# Patient Record
Sex: FEMALE | Race: WHITE | NOT HISPANIC OR LATINO | Employment: OTHER | URBAN - METROPOLITAN AREA
[De-identification: names, ages, dates, MRNs, and addresses within clinical notes are randomized per-mention and may not be internally consistent; named-entity substitution may affect disease eponyms.]

---

## 2023-10-16 NOTE — PROGRESS NOTES
History Of Present Illness  Marisabel Richardson is a 73 y.o. female referred by Dr. Sina Wilkins for evaluation of sigmoid colon polyp.    Went to ER with abdominal pain.  A CT was performed.  Pain occurs when she needs to have a bowel motion.  Was prescribed Bentyl for spasms.  Sent for a colonoscopy.    8/30/2023 CT ABD/PELVIS WO CONTRAST   A 4.3 cm x 3.1 cm soft tissue density projects in the sigmoid colon (with element of rectal wall thickening not excluded).  Follow up lower endoscopy is warranted to exclude underlying colonic neoplasia.  No hepatosplenomegaly.  A 7 mm low density focus in the segment VII cannot be fully characterize with intravenous contrast (but may relate to cyst or hemagngioma).  Pancreas closely abuts adjacent unenhanced bowel vasculature, limiting it's detailed assessment of it's head.  Question of subtle prominence of the pancreatic duct in the head of the pancreas (6 mm transverse dimension).  Mild prominence of volume of the pancreatic tail is evident.  Asymmetrical prominence of left adrenal gland (10 mm x 17 mm) is identified (with low density, less than 10 HU), likely attributable to adrenal adenoma.  A 6 mm low density exophytic focus at the upper pole right kidney demonstrates density greater than expected for simple cyst (27 HU).  Note of 2 mm non-obstructive radiopaque right mid pole renal calculus (without obstructive uropathy).  Scattered (mainly subcentimeter) retroperitoneal lymph nodes are incidentally noted.  Note:  Enhanced MR imaging of the abdomen would allow for further assessment of the pancreas, liver, adrenal gland, and right kidney.    10/02/2023 Colonoscopy  The digital rectal exam was normal.  There was significant spasm in the sigmoid colon.  Two sessile polyps were found in the ascending colon.  The polyps were 7 to 8 mm in size.  These polyps were removed with a hot snare.  Resection and retrieval were complete.  A 7 mm polyp was found in the transverse  colon.  The polyp was sessile. The polyp was removed with hot biopsy forceps. Resection and retrieval were complete.  Two sessile polyps were found in the transverse colon.  The polyps were 8 to 12 mm in size. These polyps were removed with a hot snare. Resection and retrieval were complete.  Two hyperplastic polyps were found in the sigmoid colon.  The polyps were 5 to 6 mm in size.  The polyps were removed with a hot biopsy forceps. Resection and retrieval were complete.  Multiple hyperplastic polyps were found in the recto-sigmoid colon and sigmoid colon.  A greater than 60 mm polyp was found in the sigmoid colon.  The polyp was sessile. The polyp was removed with a cold snare.  Polyp resection was incomplete. The resected tissue was retrieved.  Area was tattooed with an injection of dye.  This exam was otherwise normal throughout the examined colon.  Internal hemorrhoids were found during retroflexion. The hemorrhoids were small.    Pathology;   Sigmoid colon polyps:  Hyperplastic polyps  Ascending Colon Polyps:  Tubular adenoma and hyperplastic polyp  Transverse Colon Polyps:  Tubular adenoma and serrated polyps  Sigmoid Colon polyp:  Villotubular adenoma with low grade (moderate) glandular dysplasia        Past Medical History  Colon polyps  IBS  HLD  Hypothyroidism  Anxiety      Surgical History  Hysterectomy 1993       Social History  Smoking:  Current smoker 1 PPD  ETOH    Family History  No family history of colon cancer.       Visit Vitals  /85 (BP Location: Left arm)   Pulse 88   Temp 37.2 °C (99 °F)   Wt 61.7 kg (136 lb)       Review of Systems  Constitutional: Negative for fever, chills, anorexia, weight loss, malaise     ENMT: Negative for nasal discharge, congestion, ear pain, mouth pain, throat pain     Respiratory: Negative for cough, hemoptysis, wheezing, shortness of breath     Cardiac: Negative for chest pain, dyspnea on exertion, orthopnea, palpitations, syncope,  (+)HLD     Gastrointestinal: Negative for nausea, vomiting, diarrhea, constipation, abdominal pain, (+)COLON POLYPS    Genitourinary: Negative for discharge, dysuria, flank pain, frequency, hematuria     Musculoskeletal: Negative for decreased ROM, pain, swelling, weakness     Neurological: Negative for dizziness, confusion, headache, seizures, syncope     Psychiatric: Negative for mood changes, anxiety, hallucinations, sleep changes, suicidal ideas (+)ANXIETY     Skin: Negative for mass, pain, itching, rash, ulcer     Endocrine: Negative for heat intolerance, cold intolerance, excessive sweating, polyuria, excess thirst, (+)HYPOTHYROIDISM     Hematologic/Lymph: Negative for anemia, bruising, easy bleeding, night sweats, petechiae, history of DVT/PE or cancer     Allergic/Immunologic: Negative for anaphylaxis, itchy/ teary eyes, itching, sneezing, swelling         Physical Exam  Constitutional:       Appearance: Normal appearance.   HENT:      Head: Normocephalic.   Cardiovascular:      Rate and Rhythm: Normal rate and regular rhythm.      Pulses: Normal pulses.   Pulmonary:      Effort: Pulmonary effort is normal.   Abdominal:      General: Bowel sounds are normal.      Palpations: Abdomen is soft.   Musculoskeletal:         General: Normal range of motion.   Skin:     General: Skin is warm.   Neurological:      General: No focal deficit present.      Mental Status: She is alert.   Psychiatric:         Mood and Affect: Mood normal.         Behavior: Behavior normal.     Consent was obtained and patient was placed in left lateral cubitus position.  Flexible sigmoidoscope was inserted via the anus and manipulated to approximately 25 cm.  Large friable mass was seen at approximately 20 cm with area of tattoo in the distal sigmoid colon.  This was suspicious for underlying malignancy and did not appear to be endoscopically resectable.  On withdrawal there were multiple rectal polyps ranging from 5 mm to 1 cm.         Assessment/Plan   Problem List Items Addressed This Visit             ICD-10-CM       Gastrointestinal and Abdominal    Mass of colon K63.89    Relevant Orders    Carcinoembryonic Antigen    CT chest abdomen pelvis w IV contrast    Comprehensive Metabolic Panel    CBC    Liver cyst K76.89       Genitourinary and Reproductive    Renal lesion - Primary N28.9       Hematology and Neoplasia    Adenoma of left adrenal gland D35.02     Sigmoid colon mass with at least dysplasia, incomplete resection during colonoscopy and not amenable to endoscopic resection.  Concern for underlying malignancy.  Plan for full metastatic work-up, CT chest abdomen pelvis with IV and oral contrast, CEA.  Renal ultrasound to assess exophytic renal lesion  Plan for laparoscopic sigmoid colectomy  Risks and benefits of surgery were discussed with the patient including, but not limited to: conversion to an open procedure, infection, bleeding, wound healing issues, anastomotic leak or stricture, damage to surrounding structures (including the ureters, nerves, and major blood vessels), change in bowel habits, ostomy, incontinence, impotence, risks with anesthesia,  blood clot, heart attack, stroke, COVID infection, and death.    Continue to monitor liver cyst, adrenal adenoma

## 2023-10-24 ENCOUNTER — OFFICE VISIT (OUTPATIENT)
Dept: SURGERY | Facility: CLINIC | Age: 73
End: 2023-10-24
Payer: MEDICARE

## 2023-10-24 ENCOUNTER — LAB (OUTPATIENT)
Dept: LAB | Facility: LAB | Age: 73
End: 2023-10-24
Payer: MEDICARE

## 2023-10-24 VITALS
HEART RATE: 88 BPM | WEIGHT: 136 LBS | TEMPERATURE: 99 F | SYSTOLIC BLOOD PRESSURE: 141 MMHG | DIASTOLIC BLOOD PRESSURE: 85 MMHG

## 2023-10-24 DIAGNOSIS — K63.89 MASS OF COLON: ICD-10-CM

## 2023-10-24 DIAGNOSIS — D35.02 ADENOMA OF LEFT ADRENAL GLAND: ICD-10-CM

## 2023-10-24 DIAGNOSIS — K63.89 COLONIC MASS: ICD-10-CM

## 2023-10-24 DIAGNOSIS — K76.89 LIVER CYST: ICD-10-CM

## 2023-10-24 DIAGNOSIS — N28.9 RENAL LESION: Primary | ICD-10-CM

## 2023-10-24 LAB
ALBUMIN SERPL BCP-MCNC: 4.3 G/DL (ref 3.4–5)
ALP SERPL-CCNC: 95 U/L (ref 33–136)
ALT SERPL W P-5'-P-CCNC: 8 U/L (ref 7–45)
ANION GAP SERPL CALC-SCNC: 11 MMOL/L (ref 10–20)
AST SERPL W P-5'-P-CCNC: 12 U/L (ref 9–39)
BILIRUB SERPL-MCNC: 0.5 MG/DL (ref 0–1.2)
BUN SERPL-MCNC: 17 MG/DL (ref 6–23)
CALCIUM SERPL-MCNC: 9.1 MG/DL (ref 8.6–10.3)
CHLORIDE SERPL-SCNC: 103 MMOL/L (ref 98–107)
CO2 SERPL-SCNC: 27 MMOL/L (ref 21–32)
CREAT SERPL-MCNC: 0.76 MG/DL (ref 0.5–1.05)
ERYTHROCYTE [DISTWIDTH] IN BLOOD BY AUTOMATED COUNT: 12.8 % (ref 11.5–14.5)
GFR SERPL CREATININE-BSD FRML MDRD: 84 ML/MIN/1.73M*2
GLUCOSE SERPL-MCNC: 95 MG/DL (ref 74–99)
HCT VFR BLD AUTO: 45.1 % (ref 36–46)
HGB BLD-MCNC: 15.3 G/DL (ref 12–16)
MCH RBC QN AUTO: 31.7 PG (ref 26–34)
MCHC RBC AUTO-ENTMCNC: 33.9 G/DL (ref 32–36)
MCV RBC AUTO: 94 FL (ref 80–100)
NRBC BLD-RTO: 0 /100 WBCS (ref 0–0)
PLATELET # BLD AUTO: 255 X10*3/UL (ref 150–450)
PMV BLD AUTO: 10 FL (ref 7.5–11.5)
POTASSIUM SERPL-SCNC: 4.5 MMOL/L (ref 3.5–5.3)
PROT SERPL-MCNC: 6.4 G/DL (ref 6.4–8.2)
RBC # BLD AUTO: 4.82 X10*6/UL (ref 4–5.2)
SODIUM SERPL-SCNC: 136 MMOL/L (ref 136–145)
WBC # BLD AUTO: 6.6 X10*3/UL (ref 4.4–11.3)

## 2023-10-24 PROCEDURE — 45330 DIAGNOSTIC SIGMOIDOSCOPY: CPT | Performed by: SURGERY

## 2023-10-24 PROCEDURE — 80053 COMPREHEN METABOLIC PANEL: CPT

## 2023-10-24 PROCEDURE — 99215 OFFICE O/P EST HI 40 MIN: CPT | Performed by: SURGERY

## 2023-10-24 PROCEDURE — 1159F MED LIST DOCD IN RCRD: CPT | Performed by: SURGERY

## 2023-10-24 PROCEDURE — 85027 COMPLETE CBC AUTOMATED: CPT

## 2023-10-24 PROCEDURE — 82378 CARCINOEMBRYONIC ANTIGEN: CPT

## 2023-10-24 PROCEDURE — 36415 COLL VENOUS BLD VENIPUNCTURE: CPT

## 2023-10-24 PROCEDURE — 99205 OFFICE O/P NEW HI 60 MIN: CPT | Performed by: SURGERY

## 2023-10-24 RX ORDER — SERTRALINE HYDROCHLORIDE 100 MG/1
100 TABLET, FILM COATED ORAL DAILY
COMMUNITY

## 2023-10-24 RX ORDER — AMITRIPTYLINE HYDROCHLORIDE 25 MG/1
TABLET, FILM COATED ORAL 2 TIMES DAILY
COMMUNITY

## 2023-10-24 RX ORDER — DICYCLOMINE HYDROCHLORIDE 20 MG/1
TABLET ORAL 3 TIMES DAILY PRN
COMMUNITY
End: 2024-01-22 | Stop reason: ALTCHOICE

## 2023-10-24 RX ORDER — LEVOTHYROXINE SODIUM 25 UG/1
25 TABLET ORAL
COMMUNITY

## 2023-10-25 LAB — CEA SERPL-MCNC: 5.8 UG/L

## 2023-10-25 RX ORDER — GABAPENTIN 100 MG/1
CAPSULE ORAL
Qty: 3 CAPSULE | Refills: 0 | Status: SHIPPED | OUTPATIENT
Start: 2023-10-25 | End: 2023-12-07 | Stop reason: WASHOUT

## 2023-10-25 RX ORDER — NEOMYCIN SULFATE 500 MG/1
TABLET ORAL
Qty: 6 TABLET | Refills: 0 | Status: SHIPPED | OUTPATIENT
Start: 2023-10-25 | End: 2023-12-07 | Stop reason: WASHOUT

## 2023-10-25 RX ORDER — METRONIDAZOLE 250 MG/1
TABLET ORAL
Qty: 3 TABLET | Refills: 0 | Status: SHIPPED | OUTPATIENT
Start: 2023-10-25 | End: 2023-11-30 | Stop reason: ALTCHOICE

## 2023-10-30 ENCOUNTER — TELEPHONE (OUTPATIENT)
Dept: SURGERY | Facility: CLINIC | Age: 73
End: 2023-10-30
Payer: MEDICARE

## 2023-10-30 NOTE — TELEPHONE ENCOUNTER
She is calling c/o constipation.  History of sigmoid colon mass with at least dysplasia, incomplete resection during colonoscopy and not amenable to endoscopic resection.  Concern for underlying malignancy. On OR schedule for 12/06/2023.  She is reporting that she has not had a bowel motion since last Wednesday.  Has been struggling with constipation since summer.  She has been taking two capfuls of Miralax daily but has not yet moved her bowels.  Denies abdominal pain.  Denies n/v.  I advised to take a stimulant laxative like Milk of Magnesia or she may mix the entire bottle of 238 g of Miralax with 64 oz and drink it like a colonoscopy prep.    She will need to take preventive laxatives daily until surgery on a daily basis so this does not happen again.  Patient verbalized understanding and agreed.  Fariha Back RN

## 2023-11-08 ENCOUNTER — ANCILLARY PROCEDURE (OUTPATIENT)
Dept: RADIOLOGY | Facility: CLINIC | Age: 73
End: 2023-11-08
Payer: MEDICARE

## 2023-11-08 DIAGNOSIS — N28.9 RENAL LESION: ICD-10-CM

## 2023-11-08 DIAGNOSIS — K63.89 MASS OF COLON: Primary | ICD-10-CM

## 2023-11-08 PROCEDURE — 74177 CT ABD & PELVIS W/CONTRAST: CPT | Performed by: RADIOLOGY

## 2023-11-08 PROCEDURE — 74177 CT ABD & PELVIS W/CONTRAST: CPT

## 2023-11-08 PROCEDURE — 76770 US EXAM ABDO BACK WALL COMP: CPT

## 2023-11-08 PROCEDURE — 71260 CT THORAX DX C+: CPT | Performed by: RADIOLOGY

## 2023-11-08 PROCEDURE — 2550000001 HC RX 255 CONTRASTS: Performed by: SURGERY

## 2023-11-08 PROCEDURE — 76770 US EXAM ABDO BACK WALL COMP: CPT | Performed by: RADIOLOGY

## 2023-11-08 RX ADMIN — IOHEXOL 75 ML: 350 INJECTION, SOLUTION INTRAVENOUS at 10:27

## 2023-11-09 DIAGNOSIS — K63.89 COLONIC MASS: Primary | ICD-10-CM

## 2023-11-09 RX ORDER — SODIUM CHLORIDE 9 MG/ML
10 INJECTION, SOLUTION INTRAVENOUS CONTINUOUS
Status: CANCELLED | OUTPATIENT
Start: 2023-11-09

## 2023-11-09 RX ORDER — HEPARIN SODIUM 5000 [USP'U]/ML
5000 INJECTION, SOLUTION INTRAVENOUS; SUBCUTANEOUS ONCE
Status: CANCELLED | OUTPATIENT
Start: 2023-11-09 | End: 2023-11-09

## 2023-11-09 RX ORDER — METRONIDAZOLE 500 MG/100ML
500 INJECTION, SOLUTION INTRAVENOUS ONCE
Status: CANCELLED | OUTPATIENT
Start: 2023-11-09 | End: 2023-11-09

## 2023-11-10 ENCOUNTER — TELEMEDICINE CLINICAL SUPPORT (OUTPATIENT)
Dept: PREADMISSION TESTING | Facility: HOSPITAL | Age: 73
End: 2023-11-10
Payer: MEDICARE

## 2023-11-10 RX ORDER — BISMUTH SUBSALICYLATE 262 MG
1 TABLET,CHEWABLE ORAL DAILY
COMMUNITY

## 2023-11-10 RX ORDER — ATORVASTATIN CALCIUM 20 MG/1
20 TABLET, FILM COATED ORAL DAILY
COMMUNITY

## 2023-11-10 RX ORDER — DEXTROMETHORPHAN HYDROBROMIDE, GUAIFENESIN 5; 100 MG/5ML; MG/5ML
650 LIQUID ORAL EVERY 8 HOURS PRN
COMMUNITY
End: 2023-12-07 | Stop reason: WASHOUT

## 2023-11-14 ENCOUNTER — PRE-ADMISSION TESTING (OUTPATIENT)
Dept: PREADMISSION TESTING | Facility: HOSPITAL | Age: 73
DRG: 330 | End: 2023-11-14
Payer: MEDICARE

## 2023-11-14 ENCOUNTER — ANESTHESIA EVENT (OUTPATIENT)
Dept: OPERATING ROOM | Facility: HOSPITAL | Age: 73
DRG: 330 | End: 2023-11-14
Payer: MEDICARE

## 2023-11-14 ENCOUNTER — LAB (OUTPATIENT)
Dept: LAB | Facility: LAB | Age: 73
End: 2023-11-14
Payer: MEDICARE

## 2023-11-14 VITALS
HEART RATE: 97 BPM | HEIGHT: 68 IN | RESPIRATION RATE: 18 BRPM | TEMPERATURE: 95.7 F | WEIGHT: 140.87 LBS | OXYGEN SATURATION: 99 % | DIASTOLIC BLOOD PRESSURE: 71 MMHG | SYSTOLIC BLOOD PRESSURE: 144 MMHG | BODY MASS INDEX: 21.35 KG/M2

## 2023-11-14 DIAGNOSIS — K63.89 MASS OF COLON: Primary | ICD-10-CM

## 2023-11-14 DIAGNOSIS — K63.89 MASS OF COLON: ICD-10-CM

## 2023-11-14 LAB
ABO GROUP (TYPE) IN BLOOD: NORMAL
ANTIBODY SCREEN: NORMAL
ATRIAL RATE: 89 BPM
P AXIS: 78 DEGREES
P OFFSET: 192 MS
P ONSET: 148 MS
PR INTERVAL: 148 MS
Q ONSET: 222 MS
QRS COUNT: 15 BEATS
QRS DURATION: 72 MS
QT INTERVAL: 362 MS
QTC CALCULATION(BAZETT): 440 MS
QTC FREDERICIA: 412 MS
R AXIS: 75 DEGREES
RH FACTOR (ANTIGEN D): NORMAL
T AXIS: 71 DEGREES
T OFFSET: 403 MS
VENTRICULAR RATE: 89 BPM

## 2023-11-14 PROCEDURE — 86900 BLOOD TYPING SEROLOGIC ABO: CPT

## 2023-11-14 PROCEDURE — 86850 RBC ANTIBODY SCREEN: CPT

## 2023-11-14 PROCEDURE — 36415 COLL VENOUS BLD VENIPUNCTURE: CPT

## 2023-11-14 PROCEDURE — 86901 BLOOD TYPING SEROLOGIC RH(D): CPT

## 2023-11-14 PROCEDURE — 99204 OFFICE O/P NEW MOD 45 MIN: CPT | Performed by: NURSE PRACTITIONER

## 2023-11-14 PROCEDURE — 93005 ELECTROCARDIOGRAM TRACING: CPT | Performed by: NURSE PRACTITIONER

## 2023-11-14 PROCEDURE — 87081 CULTURE SCREEN ONLY: CPT | Mod: AHULAB | Performed by: NURSE PRACTITIONER

## 2023-11-14 RX ORDER — ADHESIVE BANDAGE
BANDAGE TOPICAL DAILY PRN
COMMUNITY

## 2023-11-14 RX ORDER — CHLORHEXIDINE GLUCONATE ORAL RINSE 1.2 MG/ML
SOLUTION DENTAL
Qty: 473 ML | Refills: 0 | Status: SHIPPED | OUTPATIENT
Start: 2023-11-14 | End: 2023-11-17 | Stop reason: HOSPADM

## 2023-11-14 ASSESSMENT — ENCOUNTER SYMPTOMS
ABDOMINAL PAIN: 1
NECK NEGATIVE: 1
CONSTITUTIONAL NEGATIVE: 1
MUSCULOSKELETAL NEGATIVE: 1
RESPIRATORY NEGATIVE: 1
ENDOCRINE NEGATIVE: 1
EYES NEGATIVE: 1
NEUROLOGICAL NEGATIVE: 1
CARDIOVASCULAR NEGATIVE: 1

## 2023-11-14 NOTE — CPM/PAT H&P
[facility] Evaluation       Name: Marisabel Richardson (Marisabel Richardson)  /Age: 1950/73 y.o.     In-Person         Date of Consult: 23    Referring Provider: Dr. Brothers    Surgery, Date, and Length:  11/15/23, laparoscopic sigmoid colectomy, colorectal anastamosis, 210 minutes    Patient presents to [facility] for perioperative risk assessment prior to scheduled surgery. She presents with colonic mass concerning for underlying malignancy. She will proceed with laparoscopic sigmoid colectomy.      This note was created in part upon personal review of patient's medical records.    Pt denies any past history of anesthetic complications such as PONV, awareness, prolonged sedation, dental damage, aspiration, cardiac arrest, difficult intubation, difficult I.V. access or unexpected hospital admissions.  No history of malignant hyperthermia and or pseudocholinesterase deficiency.  No history of blood transfusions.    The patient is not a [employer] and will accept blood and blood products if medically indicated.     Type and screen sent.       Past Medical History:   Diagnosis Date    Anxiety     Colon polyp     Depression     HLD (hyperlipidemia)     Hypothyroidism     Mass of colon     sigmoid colon       Past Surgical History:   Procedure Laterality Date    CATARACT EXTRACTION Bilateral     COLONOSCOPY      HYSTERECTOMY         Family History   Problem Relation Name Age of Onset    No Known Problems Mother      Cancer Father      Diabetes Father      Diabetes Sister      Diabetes Brother         No Known Allergies      Current Outpatient Medications:     acetaminophen (Tylenol 8 HOUR) 650 mg ER tablet, Take 1 tablet (650 mg) by mouth every 8 hours if needed for mild pain (1 - 3). Do not crush, chew, or split., Disp: , Rfl:     amitriptyline (Elavil) 25 mg tablet, 2 times a day., Disp: , Rfl:     atorvastatin (Lipitor) 20 mg tablet, Take 1 tablet (20 mg) by mouth once daily., Disp: , Rfl:      "dicyclomine (Bentyl) 20 mg tablet, Take by mouth 3 times a day as needed., Disp: , Rfl:     gabapentin (Neurontin) 100 mg capsule, Take one capsule by mouth starting three days before surgery at bedtime., Disp: 3 capsule, Rfl: 0    levothyroxine (Synthroid, Levoxyl) 25 mcg tablet, Take 1 tablet (25 mcg) by mouth., Disp: , Rfl:     magnesium hydroxide (Milk of Magnesia) 400 mg/5 mL suspension, Take by mouth once daily as needed for constipation., Disp: , Rfl:     metroNIDAZOLE (FlagyL) 250 mg tablet, Take one tablet by mouth at 6p, 7p, and 11pm the night before surgery., Disp: 3 tablet, Rfl: 0    multivitamin tablet, Take 1 tablet by mouth once daily., Disp: , Rfl:     neomycin (Mycifradin) 500 mg tablet, Take two tabs by mouth at 6p, 7pm, and 11p the night before surgery., Disp: 6 tablet, Rfl: 0    sertraline (Zoloft) 100 mg tablet, Take 1 tablet (100 mg) by mouth once daily., Disp: , Rfl:     chlorhexidine (Peridex) 0.12 % solution, SWISH AND SPIT 15ML FOR 30 SECONDS NIGHT PRIOR TO SURGERY AND MORNING OF SURGERY, Disp: 473 mL, Rfl: 0    PAT ROS:   Constitutional:   neg    Neuro/Psych:   neg    Eyes:   neg    Ears:   neg    Nose:   neg    Mouth:   neg    Throat:   neg    Neck:   neg    Cardio:   neg    Respiratory:   neg    Endocrine:   neg    GI:    abdominal pain  :   neg    Musculoskeletal:   neg    Hematologic:   neg    Skin:  neg        Physical Exam  Vitals reviewed. Physical exam within normal limits.          PAT AIRWAY:   Airway:     Mallampati::  II    Neck ROM::  Full   No broken teeth, no dentures and no missing teeth       Visit Vitals  /71   Pulse 97   Temp 35.4 °C (95.7 °F)   Resp 18   Ht 1.727 m (5' 8\")   Wt 63.9 kg (140 lb 14 oz)   SpO2 99%   BMI 21.42 kg/m²   Smoking Status Former   BSA 1.75 m²     Assessment and Plan:     Patient is a 73 year old  female scheduled for laparoscopic sigmoid colectomy with colorectal anastomosis on 11/15/23 with Dr. Brothers.    Patient is at " acceptable risk to proceed with planned surgical procedure. Further cardiac risk stratification deferred at this time.This patient is low risk candidate undergoing moderate risk procedure, patient is medically optimized for surgery.      Plan    Neuro:    Depression- continue current medication regimen    Cardiovascular:    Patient denies any chest pain, tightness, heaviness, pressure, radiating pain, palpitations, irregular heartbeats, lightheadedness, cough, congestion, shortness of breath, MIN, PND, near syncope, weight loss or gain.     Good functional capacity    EKG in PAT on  11/14/23 :  Normal sinus rhythm HR 89 bpm  Normal ECG    RCRI: 0 Risk of Mace: 3.9%    HLD- continue statin    Pulm:    Stop bang= 1, age >50    Renal/endo:    Hypothyroidism- instructed to continue Levothyroxine    Heme:  Patient instructed to ambulate as soon as possible postoperatively to decrease thromboembolic risk.    Initiate mechanical DVT prophylaxis as soon as possible and initiate chemical prophylaxis when deemed safe from a bleeding standpoint post surgery.    Risk assessment complete.  Patient is scheduled for a intermediate surgical risk procedure.  She is considered medically optimized for the planned procedure.    Labs/testing obtained in PAT on 11/14/23: T&S, EKG, MRSA. Reviewed labs from October 2023.    Lab Results   Component Value Date    WBC 6.6 10/24/2023    HGB 15.3 10/24/2023    HCT 45.1 10/24/2023    MCV 94 10/24/2023     10/24/2023       Lab Results   Component Value Date    GLUCOSE 95 10/24/2023    CALCIUM 9.1 10/24/2023     10/24/2023    K 4.5 10/24/2023    CO2 27 10/24/2023     10/24/2023    BUN 17 10/24/2023    CREATININE 0.76 10/24/2023     Follow up: MRSA pending at time of surgery.      Preoperative medication instructions were provided and reviewed with the patient.  Any additional testing or evaluation was explained to the patient.  Nothing by mouth instructions were discussed and  patient's questions were answered prior to conclusion to this encounter.  Patient verbalized understanding of preoperative instructions given in preadmission testing; discharge instructions available in EMR.    This note was dictated with speech recognition.  Minor errors may have been detected during use of speech recognition.

## 2023-11-14 NOTE — H&P (VIEW-ONLY)
[facility] Evaluation       Name: Marisabel Richardson (Marisabel Richardson)  /Age: 1950/73 y.o.     In-Person         Date of Consult: 23    Referring Provider: Dr. Brothers    Surgery, Date, and Length:  11/15/23, laparoscopic sigmoid colectomy, colorectal anastamosis, 210 minutes    Patient presents to [facility] for perioperative risk assessment prior to scheduled surgery. She presents with colonic mass concerning for underlying malignancy. She will proceed with laparoscopic sigmoid colectomy.      This note was created in part upon personal review of patient's medical records.    Pt denies any past history of anesthetic complications such as PONV, awareness, prolonged sedation, dental damage, aspiration, cardiac arrest, difficult intubation, difficult I.V. access or unexpected hospital admissions.  No history of malignant hyperthermia and or pseudocholinesterase deficiency.  No history of blood transfusions.    The patient is not a [employer] and will accept blood and blood products if medically indicated.     Type and screen sent.       Past Medical History:   Diagnosis Date    Anxiety     Colon polyp     Depression     HLD (hyperlipidemia)     Hypothyroidism     Mass of colon     sigmoid colon       Past Surgical History:   Procedure Laterality Date    CATARACT EXTRACTION Bilateral     COLONOSCOPY      HYSTERECTOMY         Family History   Problem Relation Name Age of Onset    No Known Problems Mother      Cancer Father      Diabetes Father      Diabetes Sister      Diabetes Brother         No Known Allergies      Current Outpatient Medications:     acetaminophen (Tylenol 8 HOUR) 650 mg ER tablet, Take 1 tablet (650 mg) by mouth every 8 hours if needed for mild pain (1 - 3). Do not crush, chew, or split., Disp: , Rfl:     amitriptyline (Elavil) 25 mg tablet, 2 times a day., Disp: , Rfl:     atorvastatin (Lipitor) 20 mg tablet, Take 1 tablet (20 mg) by mouth once daily., Disp: , Rfl:      "dicyclomine (Bentyl) 20 mg tablet, Take by mouth 3 times a day as needed., Disp: , Rfl:     gabapentin (Neurontin) 100 mg capsule, Take one capsule by mouth starting three days before surgery at bedtime., Disp: 3 capsule, Rfl: 0    levothyroxine (Synthroid, Levoxyl) 25 mcg tablet, Take 1 tablet (25 mcg) by mouth., Disp: , Rfl:     magnesium hydroxide (Milk of Magnesia) 400 mg/5 mL suspension, Take by mouth once daily as needed for constipation., Disp: , Rfl:     metroNIDAZOLE (FlagyL) 250 mg tablet, Take one tablet by mouth at 6p, 7p, and 11pm the night before surgery., Disp: 3 tablet, Rfl: 0    multivitamin tablet, Take 1 tablet by mouth once daily., Disp: , Rfl:     neomycin (Mycifradin) 500 mg tablet, Take two tabs by mouth at 6p, 7pm, and 11p the night before surgery., Disp: 6 tablet, Rfl: 0    sertraline (Zoloft) 100 mg tablet, Take 1 tablet (100 mg) by mouth once daily., Disp: , Rfl:     chlorhexidine (Peridex) 0.12 % solution, SWISH AND SPIT 15ML FOR 30 SECONDS NIGHT PRIOR TO SURGERY AND MORNING OF SURGERY, Disp: 473 mL, Rfl: 0    PAT ROS:   Constitutional:   neg    Neuro/Psych:   neg    Eyes:   neg    Ears:   neg    Nose:   neg    Mouth:   neg    Throat:   neg    Neck:   neg    Cardio:   neg    Respiratory:   neg    Endocrine:   neg    GI:    abdominal pain  :   neg    Musculoskeletal:   neg    Hematologic:   neg    Skin:  neg        Physical Exam  Vitals reviewed. Physical exam within normal limits.          PAT AIRWAY:   Airway:     Mallampati::  II    Neck ROM::  Full   No broken teeth, no dentures and no missing teeth       Visit Vitals  /71   Pulse 97   Temp 35.4 °C (95.7 °F)   Resp 18   Ht 1.727 m (5' 8\")   Wt 63.9 kg (140 lb 14 oz)   SpO2 99%   BMI 21.42 kg/m²   Smoking Status Former   BSA 1.75 m²     Assessment and Plan:     Patient is a 73 year old  female scheduled for laparoscopic sigmoid colectomy with colorectal anastomosis on 11/15/23 with Dr. Brothers.    Patient is at " acceptable risk to proceed with planned surgical procedure. Further cardiac risk stratification deferred at this time.This patient is low risk candidate undergoing moderate risk procedure, patient is medically optimized for surgery.      Plan    Neuro:    Depression- continue current medication regimen    Cardiovascular:    Patient denies any chest pain, tightness, heaviness, pressure, radiating pain, palpitations, irregular heartbeats, lightheadedness, cough, congestion, shortness of breath, MIN, PND, near syncope, weight loss or gain.     Good functional capacity    EKG in PAT on  11/14/23 :  Normal sinus rhythm HR 89 bpm  Normal ECG    RCRI: 0 Risk of Mace: 3.9%    HLD- continue statin    Pulm:    Stop bang= 1, age >50    Renal/endo:    Hypothyroidism- instructed to continue Levothyroxine    Heme:  Patient instructed to ambulate as soon as possible postoperatively to decrease thromboembolic risk.    Initiate mechanical DVT prophylaxis as soon as possible and initiate chemical prophylaxis when deemed safe from a bleeding standpoint post surgery.    Risk assessment complete.  Patient is scheduled for a intermediate surgical risk procedure.  She is considered medically optimized for the planned procedure.    Labs/testing obtained in PAT on 11/14/23: T&S, EKG, MRSA. Reviewed labs from October 2023.    Lab Results   Component Value Date    WBC 6.6 10/24/2023    HGB 15.3 10/24/2023    HCT 45.1 10/24/2023    MCV 94 10/24/2023     10/24/2023       Lab Results   Component Value Date    GLUCOSE 95 10/24/2023    CALCIUM 9.1 10/24/2023     10/24/2023    K 4.5 10/24/2023    CO2 27 10/24/2023     10/24/2023    BUN 17 10/24/2023    CREATININE 0.76 10/24/2023     Follow up: MRSA pending at time of surgery.      Preoperative medication instructions were provided and reviewed with the patient.  Any additional testing or evaluation was explained to the patient.  Nothing by mouth instructions were discussed and  patient's questions were answered prior to conclusion to this encounter.  Patient verbalized understanding of preoperative instructions given in preadmission testing; discharge instructions available in EMR.    This note was dictated with speech recognition.  Minor errors may have been detected during use of speech recognition.

## 2023-11-14 NOTE — PREPROCEDURE INSTRUCTIONS
Medication List            Accurate as of November 14, 2023  8:01 AM. Always use your most recent med list.                acetaminophen 650 mg ER tablet  Commonly known as: Tylenol 8 HOUR  Notes to patient: May take morning of surgery     amitriptyline 25 mg tablet  Commonly known as: Elavil  Notes to patient: May take morning of surgery     atorvastatin 20 mg tablet  Commonly known as: Lipitor  Medication Adjustments for Surgery: Stop 1 day before surgery     chlorhexidine 0.12 % solution  Commonly known as: Peridex  SWISH AND SPIT 15ML FOR 30 SECONDS NIGHT PRIOR TO SURGERY AND MORNING OF SURGERY     dicyclomine 20 mg tablet  Commonly known as: Bentyl  Notes to patient: Take as ordered     gabapentin 100 mg capsule  Commonly known as: Neurontin  Take one capsule by mouth starting three days before surgery at bedtime.  Notes to patient: Take as ordered     levothyroxine 25 mcg tablet  Commonly known as: Synthroid, Levoxyl  Notes to patient: May take morning of surgery     magnesium hydroxide 400 mg/5 mL suspension  Commonly known as: Milk of Magnesia  Medication Adjustments for Surgery: Stop 1 day before surgery     metroNIDAZOLE 250 mg tablet  Commonly known as: FlagyL  Take one tablet by mouth at 6p, 7p, and 11pm the night before surgery.  Notes to patient: Take as ordered     multivitamin tablet  Medication Adjustments for Surgery: Stop 1 day before surgery     neomycin 500 mg tablet  Commonly known as: Mycifradin  Take two tabs by mouth at 6p, 7pm, and 11p the night before surgery.  Notes to patient: Take as ordered     sertraline 100 mg tablet  Commonly known as: Zoloft  Notes to patient: May take morning of surgery              CONTACT SURGEON'S OFFICE IF YOU DEVELOP:  * Fever = 100.4 F   * New respiratory symptoms (e.g. cough, shortness of breath, respiratory distress, sore throat)  * Recent loss of taste or smell  *Flu like symptoms such as headache, fatigue or gastrointestinal symptoms  * You develop  any open sores, shingles, burning or painful urination   AND/OR:  * You no longer wish to have the surgery.  * Any other personal circumstances change that may lead to the need to cancel or defer this surgery.  *You were admitted to any hospital within one week of your planned procedure.    SMOKING:  *Quitting smoking can make a huge difference to your health and recovery from surgery.    *If you need help with quitting, call 4-980-QUIT-NOW.    THE DAY BEFORE SURGERY:  *Do not eat any food after midnight the night before surgery.   *You are permitted to drink clear liquids (i.e. water, black coffee, tea, clear broth, apple juice) up to 2 hours before your surgery.  DIABETICS:  Please check fasting blood sugar  upon waking up.  If fasting sugar is <80 mg/dl, please drink 100ml/3oz of apple juice no later than 2 hours prior to surgery.      SURGICAL TIME  *You will be contacted between 2 p.m. and 6 p.m. the business day before your surgery with your arrival time.  *If you haven't received a call by 6pm, call [phone].  *Scheduled surgery times may change and you will be notified if this occurs-check your personal voicemail for any updates.    ON THE MORNING OF SURGERY:  *Wear comfortable, loose fitting clothing.   *Do not use moisturizers, creams, lotions or perfume.  *All jewelry and valuables should be left at home.  *Prosthetic devices such as contact lenses, hearing aids, dentures, eyelash extensions, hairpins and body piercing must be removed before surgery.    BRING WITH YOU:  *Photo ID and insurance card  *Current list of medicines and allergies  *Pacemaker/Defibrillator/Heart stent cards  *CPAP machine and mask  *Slings/splints/crutches  *Copy of your complete Advanced Directive/DHPOA-if applicable  *Neurostimulator implant remote    PARKING AND ARRIVAL:  *Check in at the Main Entrance desk and let them know you are here for surgery.  *You will be directed to the 2nd floor surgical waiting area.    AFTER  OUTPATIENT SURGERY:  *A responsible adult MUST accompany you at the time of discharge and stay with you for 24 hours after your surgery.  *You may NOT drive yourself home after surgery.  *You may use a taxi or ride sharing service ([employer], Uber) to return home ONLY if you are accompanied by a friend or family member.  *Instructions for resuming your medications will be provided by your surgeon.

## 2023-11-15 ENCOUNTER — ANESTHESIA (OUTPATIENT)
Dept: OPERATING ROOM | Facility: HOSPITAL | Age: 73
DRG: 330 | End: 2023-11-15
Payer: MEDICARE

## 2023-11-15 ENCOUNTER — HOSPITAL ENCOUNTER (INPATIENT)
Facility: HOSPITAL | Age: 73
LOS: 2 days | Discharge: HOME | DRG: 330 | End: 2023-11-17
Attending: SURGERY | Admitting: SURGERY
Payer: MEDICARE

## 2023-11-15 DIAGNOSIS — K63.89 COLONIC MASS: Primary | ICD-10-CM

## 2023-11-15 DIAGNOSIS — K63.89 MASS OF COLON: ICD-10-CM

## 2023-11-15 PROBLEM — F41.9 ANXIETY: Status: ACTIVE | Noted: 2023-11-15

## 2023-11-15 PROBLEM — F03.90 DEMENTIA (MULTI): Status: ACTIVE | Noted: 2023-11-15

## 2023-11-15 PROBLEM — E03.9 HYPOTHYROIDISM: Status: ACTIVE | Noted: 2023-11-15

## 2023-11-15 PROBLEM — E78.5 HYPERLIPIDEMIA: Status: ACTIVE | Noted: 2023-11-15

## 2023-11-15 LAB
ABO GROUP (TYPE) IN BLOOD: NORMAL
RH FACTOR (ANTIGEN D): NORMAL

## 2023-11-15 PROCEDURE — 2720000007 HC OR 272 NO HCPCS: Performed by: SURGERY

## 2023-11-15 PROCEDURE — 36415 COLL VENOUS BLD VENIPUNCTURE: CPT | Performed by: SURGERY

## 2023-11-15 PROCEDURE — 96372 THER/PROPH/DIAG INJ SC/IM: CPT | Performed by: SURGERY

## 2023-11-15 PROCEDURE — A44204 PERIPHERAL IV: Performed by: ANESTHESIOLOGY

## 2023-11-15 PROCEDURE — 88309 TISSUE EXAM BY PATHOLOGIST: CPT | Performed by: PATHOLOGY

## 2023-11-15 PROCEDURE — 88342 IMHCHEM/IMCYTCHM 1ST ANTB: CPT | Performed by: PATHOLOGY

## 2023-11-15 PROCEDURE — 2500000005 HC RX 250 GENERAL PHARMACY W/O HCPCS: Performed by: ANESTHESIOLOGIST ASSISTANT

## 2023-11-15 PROCEDURE — 88309 TISSUE EXAM BY PATHOLOGIST: CPT | Mod: TC,AHULAB | Performed by: SURGERY

## 2023-11-15 PROCEDURE — 99232 SBSQ HOSP IP/OBS MODERATE 35: CPT

## 2023-11-15 PROCEDURE — 3700000002 HC GENERAL ANESTHESIA TIME - EACH INCREMENTAL 1 MINUTE: Performed by: SURGERY

## 2023-11-15 PROCEDURE — 2500000004 HC RX 250 GENERAL PHARMACY W/ HCPCS (ALT 636 FOR OP/ED): Performed by: ANESTHESIOLOGY

## 2023-11-15 PROCEDURE — 0DTN4ZZ RESECTION OF SIGMOID COLON, PERCUTANEOUS ENDOSCOPIC APPROACH: ICD-10-PCS | Performed by: SURGERY

## 2023-11-15 PROCEDURE — 1100000001 HC PRIVATE ROOM DAILY

## 2023-11-15 PROCEDURE — 7100000002 HC RECOVERY ROOM TIME - EACH INCREMENTAL 1 MINUTE: Performed by: SURGERY

## 2023-11-15 PROCEDURE — 2500000001 HC RX 250 WO HCPCS SELF ADMINISTERED DRUGS (ALT 637 FOR MEDICARE OP): Performed by: ANESTHESIOLOGY

## 2023-11-15 PROCEDURE — 3700000001 HC GENERAL ANESTHESIA TIME - INITIAL BASE CHARGE: Performed by: SURGERY

## 2023-11-15 PROCEDURE — 2500000001 HC RX 250 WO HCPCS SELF ADMINISTERED DRUGS (ALT 637 FOR MEDICARE OP): Performed by: SURGERY

## 2023-11-15 PROCEDURE — 7100000001 HC RECOVERY ROOM TIME - INITIAL BASE CHARGE: Performed by: SURGERY

## 2023-11-15 PROCEDURE — 2500000004 HC RX 250 GENERAL PHARMACY W/ HCPCS (ALT 636 FOR OP/ED): Performed by: ANESTHESIOLOGIST ASSISTANT

## 2023-11-15 PROCEDURE — C9113 INJ PANTOPRAZOLE SODIUM, VIA: HCPCS | Performed by: SURGERY

## 2023-11-15 PROCEDURE — 3600000009 HC OR TIME - EACH INCREMENTAL 1 MINUTE - PROCEDURE LEVEL FOUR: Performed by: SURGERY

## 2023-11-15 PROCEDURE — 88309 TISSUE EXAM BY PATHOLOGIST: CPT | Mod: TC,SUR,AHULAB | Performed by: SURGERY

## 2023-11-15 PROCEDURE — 3600000004 HC OR TIME - INITIAL BASE CHARGE - PROCEDURE LEVEL FOUR: Performed by: SURGERY

## 2023-11-15 PROCEDURE — A44204 PR LAP,SURG,COLECTOMY, PARTIAL, W/ANAST: Performed by: ANESTHESIOLOGIST ASSISTANT

## 2023-11-15 PROCEDURE — A4217 STERILE WATER/SALINE, 500 ML: HCPCS | Performed by: SURGERY

## 2023-11-15 PROCEDURE — 44204 LAPARO PARTIAL COLECTOMY: CPT | Performed by: SURGERY

## 2023-11-15 PROCEDURE — 2500000004 HC RX 250 GENERAL PHARMACY W/ HCPCS (ALT 636 FOR OP/ED): Performed by: SURGERY

## 2023-11-15 PROCEDURE — 99100 ANES PT EXTEME AGE<1 YR&>70: CPT | Performed by: ANESTHESIOLOGY

## 2023-11-15 PROCEDURE — 88341 IMHCHEM/IMCYTCHM EA ADD ANTB: CPT | Performed by: PATHOLOGY

## 2023-11-15 PROCEDURE — 94760 N-INVAS EAR/PLS OXIMETRY 1: CPT

## 2023-11-15 PROCEDURE — 0DJD8ZZ INSPECTION OF LOWER INTESTINAL TRACT, VIA NATURAL OR ARTIFICIAL OPENING ENDOSCOPIC: ICD-10-PCS | Performed by: SURGERY

## 2023-11-15 PROCEDURE — 2500000001 HC RX 250 WO HCPCS SELF ADMINISTERED DRUGS (ALT 637 FOR MEDICARE OP)

## 2023-11-15 RX ORDER — SIMVASTATIN 40 MG/1
40 TABLET, FILM COATED ORAL NIGHTLY
Status: DISCONTINUED | OUTPATIENT
Start: 2023-11-15 | End: 2023-11-17 | Stop reason: HOSPADM

## 2023-11-15 RX ORDER — PHENYLEPHRINE HCL IN 0.9% NACL 1 MG/10 ML
SYRINGE (ML) INTRAVENOUS AS NEEDED
Status: DISCONTINUED | OUTPATIENT
Start: 2023-11-15 | End: 2023-11-15

## 2023-11-15 RX ORDER — TALC
6 POWDER (GRAM) TOPICAL NIGHTLY PRN
Status: DISCONTINUED | OUTPATIENT
Start: 2023-11-15 | End: 2023-11-17 | Stop reason: HOSPADM

## 2023-11-15 RX ORDER — SODIUM CHLORIDE 9 MG/ML
40 INJECTION, SOLUTION INTRAVENOUS CONTINUOUS
Status: DISCONTINUED | OUTPATIENT
Start: 2023-11-15 | End: 2023-11-17 | Stop reason: HOSPADM

## 2023-11-15 RX ORDER — AMITRIPTYLINE HYDROCHLORIDE 25 MG/1
25 TABLET, FILM COATED ORAL 3 TIMES DAILY
Status: DISCONTINUED | OUTPATIENT
Start: 2023-11-15 | End: 2023-11-17 | Stop reason: HOSPADM

## 2023-11-15 RX ORDER — MIDAZOLAM HYDROCHLORIDE 1 MG/ML
INJECTION, SOLUTION INTRAMUSCULAR; INTRAVENOUS AS NEEDED
Status: DISCONTINUED | OUTPATIENT
Start: 2023-11-15 | End: 2023-11-15

## 2023-11-15 RX ORDER — ATORVASTATIN CALCIUM 10 MG/1
20 TABLET, FILM COATED ORAL DAILY
Status: DISCONTINUED | OUTPATIENT
Start: 2023-11-15 | End: 2023-11-15

## 2023-11-15 RX ORDER — LEVOTHYROXINE SODIUM 25 UG/1
25 TABLET ORAL DAILY
Status: DISCONTINUED | OUTPATIENT
Start: 2023-11-15 | End: 2023-11-17 | Stop reason: HOSPADM

## 2023-11-15 RX ORDER — NALOXONE HYDROCHLORIDE 1 MG/ML
0.2 INJECTION INTRAMUSCULAR; INTRAVENOUS; SUBCUTANEOUS EVERY 5 MIN PRN
Status: DISCONTINUED | OUTPATIENT
Start: 2023-11-15 | End: 2023-11-17 | Stop reason: HOSPADM

## 2023-11-15 RX ORDER — PROMETHAZINE HYDROCHLORIDE 25 MG/1
25 TABLET ORAL EVERY 6 HOURS PRN
Status: DISCONTINUED | OUTPATIENT
Start: 2023-11-15 | End: 2023-11-17 | Stop reason: HOSPADM

## 2023-11-15 RX ORDER — CEFAZOLIN SODIUM 2 G/100ML
2 INJECTION, SOLUTION INTRAVENOUS ONCE
Status: DISCONTINUED | OUTPATIENT
Start: 2023-11-15 | End: 2023-11-15 | Stop reason: HOSPADM

## 2023-11-15 RX ORDER — OXYCODONE HYDROCHLORIDE 5 MG/1
5 TABLET ORAL EVERY 4 HOURS PRN
Status: DISCONTINUED | OUTPATIENT
Start: 2023-11-15 | End: 2023-11-15 | Stop reason: HOSPADM

## 2023-11-15 RX ORDER — ENOXAPARIN SODIUM 100 MG/ML
40 INJECTION SUBCUTANEOUS EVERY 24 HOURS
Status: DISCONTINUED | OUTPATIENT
Start: 2023-11-16 | End: 2023-11-17 | Stop reason: HOSPADM

## 2023-11-15 RX ORDER — ONDANSETRON HYDROCHLORIDE 2 MG/ML
INJECTION, SOLUTION INTRAVENOUS AS NEEDED
Status: DISCONTINUED | OUTPATIENT
Start: 2023-11-15 | End: 2023-11-15

## 2023-11-15 RX ORDER — LIDOCAINE HYDROCHLORIDE 20 MG/ML
INJECTION, SOLUTION EPIDURAL; INFILTRATION; INTRACAUDAL; PERINEURAL AS NEEDED
Status: DISCONTINUED | OUTPATIENT
Start: 2023-11-15 | End: 2023-11-15

## 2023-11-15 RX ORDER — HYDROMORPHONE HYDROCHLORIDE 1 MG/ML
INJECTION, SOLUTION INTRAMUSCULAR; INTRAVENOUS; SUBCUTANEOUS AS NEEDED
Status: DISCONTINUED | OUTPATIENT
Start: 2023-11-15 | End: 2023-11-15

## 2023-11-15 RX ORDER — HEPARIN SODIUM 5000 [USP'U]/ML
5000 INJECTION, SOLUTION INTRAVENOUS; SUBCUTANEOUS ONCE
Status: COMPLETED | OUTPATIENT
Start: 2023-11-15 | End: 2023-11-15

## 2023-11-15 RX ORDER — ROCURONIUM BROMIDE 10 MG/ML
INJECTION, SOLUTION INTRAVENOUS AS NEEDED
Status: DISCONTINUED | OUTPATIENT
Start: 2023-11-15 | End: 2023-11-15

## 2023-11-15 RX ORDER — SODIUM CHLORIDE 9 MG/ML
10 INJECTION, SOLUTION INTRAVENOUS CONTINUOUS
Status: DISCONTINUED | OUTPATIENT
Start: 2023-11-15 | End: 2023-11-16

## 2023-11-15 RX ORDER — ACETAMINOPHEN 325 MG/1
650 TABLET ORAL EVERY 4 HOURS PRN
Status: DISCONTINUED | OUTPATIENT
Start: 2023-11-15 | End: 2023-11-17 | Stop reason: HOSPADM

## 2023-11-15 RX ORDER — ONDANSETRON HYDROCHLORIDE 2 MG/ML
4 INJECTION, SOLUTION INTRAVENOUS ONCE AS NEEDED
Status: DISCONTINUED | OUTPATIENT
Start: 2023-11-15 | End: 2023-11-15 | Stop reason: HOSPADM

## 2023-11-15 RX ORDER — PROPOFOL 10 MG/ML
INJECTION, EMULSION INTRAVENOUS AS NEEDED
Status: DISCONTINUED | OUTPATIENT
Start: 2023-11-15 | End: 2023-11-15

## 2023-11-15 RX ORDER — SODIUM CHLORIDE 0.9 G/100ML
IRRIGANT IRRIGATION AS NEEDED
Status: DISCONTINUED | OUTPATIENT
Start: 2023-11-15 | End: 2023-11-15 | Stop reason: HOSPADM

## 2023-11-15 RX ORDER — OXYCODONE HYDROCHLORIDE 5 MG/1
10 TABLET ORAL EVERY 4 HOURS PRN
Status: DISCONTINUED | OUTPATIENT
Start: 2023-11-15 | End: 2023-11-17 | Stop reason: HOSPADM

## 2023-11-15 RX ORDER — MEPERIDINE HYDROCHLORIDE 25 MG/ML
12.5 INJECTION INTRAMUSCULAR; INTRAVENOUS; SUBCUTANEOUS EVERY 10 MIN PRN
Status: DISCONTINUED | OUTPATIENT
Start: 2023-11-15 | End: 2023-11-15 | Stop reason: HOSPADM

## 2023-11-15 RX ORDER — SERTRALINE HYDROCHLORIDE 50 MG/1
100 TABLET, FILM COATED ORAL DAILY
Status: DISCONTINUED | OUTPATIENT
Start: 2023-11-15 | End: 2023-11-17 | Stop reason: HOSPADM

## 2023-11-15 RX ORDER — BUPIVACAINE HYDROCHLORIDE 5 MG/ML
INJECTION, SOLUTION EPIDURAL; INTRACAUDAL AS NEEDED
Status: DISCONTINUED | OUTPATIENT
Start: 2023-11-15 | End: 2023-11-15 | Stop reason: HOSPADM

## 2023-11-15 RX ORDER — CEFAZOLIN SODIUM 2 G/50ML
SOLUTION INTRAVENOUS AS NEEDED
Status: DISCONTINUED | OUTPATIENT
Start: 2023-11-15 | End: 2023-11-15

## 2023-11-15 RX ORDER — GABAPENTIN 300 MG/1
300 CAPSULE ORAL 3 TIMES DAILY
Status: DISCONTINUED | OUTPATIENT
Start: 2023-11-15 | End: 2023-11-17 | Stop reason: HOSPADM

## 2023-11-15 RX ORDER — PHENAZOPYRIDINE HYDROCHLORIDE 100 MG/1
100 TABLET, FILM COATED ORAL
Status: DISCONTINUED | OUTPATIENT
Start: 2023-11-15 | End: 2023-11-17 | Stop reason: HOSPADM

## 2023-11-15 RX ORDER — SODIUM CHLORIDE, SODIUM LACTATE, POTASSIUM CHLORIDE, CALCIUM CHLORIDE 600; 310; 30; 20 MG/100ML; MG/100ML; MG/100ML; MG/100ML
100 INJECTION, SOLUTION INTRAVENOUS CONTINUOUS
Status: DISCONTINUED | OUTPATIENT
Start: 2023-11-15 | End: 2023-11-15 | Stop reason: HOSPADM

## 2023-11-15 RX ORDER — PROMETHAZINE HYDROCHLORIDE 25 MG/1
25 SUPPOSITORY RECTAL EVERY 12 HOURS PRN
Status: DISCONTINUED | OUTPATIENT
Start: 2023-11-15 | End: 2023-11-15

## 2023-11-15 RX ORDER — METRONIDAZOLE 500 MG/100ML
500 INJECTION, SOLUTION INTRAVENOUS ONCE
Status: COMPLETED | OUTPATIENT
Start: 2023-11-15 | End: 2023-11-15

## 2023-11-15 RX ORDER — PANTOPRAZOLE SODIUM 40 MG/10ML
20 INJECTION, POWDER, LYOPHILIZED, FOR SOLUTION INTRAVENOUS
Status: DISCONTINUED | OUTPATIENT
Start: 2023-11-15 | End: 2023-11-17 | Stop reason: HOSPADM

## 2023-11-15 RX ORDER — OXYCODONE HYDROCHLORIDE 5 MG/1
5 TABLET ORAL EVERY 4 HOURS PRN
Status: DISCONTINUED | OUTPATIENT
Start: 2023-11-15 | End: 2023-11-17 | Stop reason: HOSPADM

## 2023-11-15 RX ORDER — FENTANYL CITRATE 50 UG/ML
INJECTION, SOLUTION INTRAMUSCULAR; INTRAVENOUS AS NEEDED
Status: DISCONTINUED | OUTPATIENT
Start: 2023-11-15 | End: 2023-11-15

## 2023-11-15 RX ORDER — SODIUM CHLORIDE, SODIUM LACTATE, POTASSIUM CHLORIDE, CALCIUM CHLORIDE 600; 310; 30; 20 MG/100ML; MG/100ML; MG/100ML; MG/100ML
100 INJECTION, SOLUTION INTRAVENOUS CONTINUOUS
Status: DISCONTINUED | OUTPATIENT
Start: 2023-11-15 | End: 2023-11-16

## 2023-11-15 RX ADMIN — Medication 200 MCG: at 12:00

## 2023-11-15 RX ADMIN — OXYCODONE HYDROCHLORIDE 5 MG: 5 TABLET ORAL at 18:52

## 2023-11-15 RX ADMIN — Medication 200 MCG: at 09:31

## 2023-11-15 RX ADMIN — Medication 200 MCG: at 09:51

## 2023-11-15 RX ADMIN — HYDROMORPHONE HYDROCHLORIDE 0.5 MG: 1 INJECTION, SOLUTION INTRAMUSCULAR; INTRAVENOUS; SUBCUTANEOUS at 12:14

## 2023-11-15 RX ADMIN — ONDANSETRON 4 MG: 2 INJECTION INTRAMUSCULAR; INTRAVENOUS at 11:55

## 2023-11-15 RX ADMIN — Medication 200 MCG: at 11:03

## 2023-11-15 RX ADMIN — PROPOFOL 120 MG: 10 INJECTION, EMULSION INTRAVENOUS at 09:19

## 2023-11-15 RX ADMIN — ROCURONIUM BROMIDE 40 MG: 10 INJECTION, SOLUTION INTRAVENOUS at 10:27

## 2023-11-15 RX ADMIN — GABAPENTIN 300 MG: 300 CAPSULE ORAL at 21:19

## 2023-11-15 RX ADMIN — ROCURONIUM BROMIDE 30 MG: 10 INJECTION, SOLUTION INTRAVENOUS at 11:23

## 2023-11-15 RX ADMIN — SODIUM CHLORIDE, POTASSIUM CHLORIDE, SODIUM LACTATE AND CALCIUM CHLORIDE: 600; 310; 30; 20 INJECTION, SOLUTION INTRAVENOUS at 08:05

## 2023-11-15 RX ADMIN — Medication 200 MCG: at 09:40

## 2023-11-15 RX ADMIN — SIMVASTATIN 40 MG: 40 TABLET, FILM COATED ORAL at 21:19

## 2023-11-15 RX ADMIN — ROCURONIUM BROMIDE 20 MG: 10 INJECTION, SOLUTION INTRAVENOUS at 09:57

## 2023-11-15 RX ADMIN — DEXAMETHASONE SODIUM PHOSPHATE 8 MG: 4 INJECTION, SOLUTION INTRAMUSCULAR; INTRAVENOUS at 09:20

## 2023-11-15 RX ADMIN — Medication 200 MCG: at 11:47

## 2023-11-15 RX ADMIN — HYDROMORPHONE HYDROCHLORIDE 0.5 MG: 1 INJECTION, SOLUTION INTRAMUSCULAR; INTRAVENOUS; SUBCUTANEOUS at 13:39

## 2023-11-15 RX ADMIN — AMITRIPTYLINE HYDROCHLORIDE 25 MG: 25 TABLET, FILM COATED ORAL at 17:56

## 2023-11-15 RX ADMIN — Medication 200 MCG: at 11:04

## 2023-11-15 RX ADMIN — SODIUM CHLORIDE, POTASSIUM CHLORIDE, SODIUM LACTATE AND CALCIUM CHLORIDE: 600; 310; 30; 20 INJECTION, SOLUTION INTRAVENOUS at 09:56

## 2023-11-15 RX ADMIN — SODIUM CHLORIDE, POTASSIUM CHLORIDE, SODIUM LACTATE AND CALCIUM CHLORIDE 100 ML/HR: 600; 310; 30; 20 INJECTION, SOLUTION INTRAVENOUS at 08:10

## 2023-11-15 RX ADMIN — HEPARIN SODIUM 5000 UNITS: 5000 INJECTION INTRAVENOUS; SUBCUTANEOUS at 08:05

## 2023-11-15 RX ADMIN — CEFAZOLIN SODIUM 2 G: 2 SOLUTION INTRAVENOUS at 09:20

## 2023-11-15 RX ADMIN — OXYCODONE HYDROCHLORIDE 5 MG: 5 TABLET ORAL at 13:53

## 2023-11-15 RX ADMIN — GABAPENTIN 300 MG: 300 CAPSULE ORAL at 17:56

## 2023-11-15 RX ADMIN — LIDOCAINE HYDROCHLORIDE 60 MG: 20 INJECTION, SOLUTION EPIDURAL; INFILTRATION; INTRACAUDAL; PERINEURAL at 09:19

## 2023-11-15 RX ADMIN — PHENAZOPYRIDINE 100 MG: 100 TABLET ORAL at 17:56

## 2023-11-15 RX ADMIN — PROPOFOL 80 MG: 10 INJECTION, EMULSION INTRAVENOUS at 09:56

## 2023-11-15 RX ADMIN — ACETAMINOPHEN 650 MG: 325 TABLET ORAL at 17:56

## 2023-11-15 RX ADMIN — AMITRIPTYLINE HYDROCHLORIDE 25 MG: 25 TABLET, FILM COATED ORAL at 21:20

## 2023-11-15 RX ADMIN — PANTOPRAZOLE SODIUM 20 MG: 40 INJECTION, POWDER, FOR SOLUTION INTRAVENOUS at 21:19

## 2023-11-15 RX ADMIN — ROCURONIUM BROMIDE 40 MG: 10 INJECTION, SOLUTION INTRAVENOUS at 09:19

## 2023-11-15 RX ADMIN — METRONIDAZOLE 500 MG: 500 INJECTION, SOLUTION INTRAVENOUS at 08:27

## 2023-11-15 RX ADMIN — SUGAMMADEX 200 MG: 100 INJECTION, SOLUTION INTRAVENOUS at 12:17

## 2023-11-15 RX ADMIN — FENTANYL CITRATE 100 MCG: 50 INJECTION, SOLUTION INTRAMUSCULAR; INTRAVENOUS at 09:19

## 2023-11-15 RX ADMIN — HYDROMORPHONE HYDROCHLORIDE 0.5 MG: 1 INJECTION, SOLUTION INTRAMUSCULAR; INTRAVENOUS; SUBCUTANEOUS at 13:13

## 2023-11-15 RX ADMIN — SERTRALINE HYDROCHLORIDE 100 MG: 50 TABLET ORAL at 17:56

## 2023-11-15 RX ADMIN — MIDAZOLAM HYDROCHLORIDE 2 MG: 1 INJECTION, SOLUTION INTRAMUSCULAR; INTRAVENOUS at 09:16

## 2023-11-15 SDOH — SOCIAL STABILITY: SOCIAL INSECURITY: ARE YOU OR HAVE YOU BEEN THREATENED OR ABUSED PHYSICALLY, EMOTIONALLY, OR SEXUALLY BY ANYONE?: NO

## 2023-11-15 SDOH — SOCIAL STABILITY: SOCIAL INSECURITY: DOES ANYONE TRY TO KEEP YOU FROM HAVING/CONTACTING OTHER FRIENDS OR DOING THINGS OUTSIDE YOUR HOME?: NO

## 2023-11-15 SDOH — SOCIAL STABILITY: SOCIAL INSECURITY: HAS ANYONE EVER THREATENED TO HURT YOUR FAMILY OR YOUR PETS?: NO

## 2023-11-15 SDOH — SOCIAL STABILITY: SOCIAL INSECURITY: DO YOU FEEL ANYONE HAS EXPLOITED OR TAKEN ADVANTAGE OF YOU FINANCIALLY OR OF YOUR PERSONAL PROPERTY?: NO

## 2023-11-15 SDOH — SOCIAL STABILITY: SOCIAL INSECURITY: HAVE YOU HAD THOUGHTS OF HARMING ANYONE ELSE?: NO

## 2023-11-15 SDOH — SOCIAL STABILITY: SOCIAL INSECURITY: DO YOU FEEL UNSAFE GOING BACK TO THE PLACE WHERE YOU ARE LIVING?: NO

## 2023-11-15 SDOH — SOCIAL STABILITY: SOCIAL INSECURITY: ARE THERE ANY APPARENT SIGNS OF INJURIES/BEHAVIORS THAT COULD BE RELATED TO ABUSE/NEGLECT?: NO

## 2023-11-15 SDOH — SOCIAL STABILITY: SOCIAL INSECURITY: ABUSE: ADULT

## 2023-11-15 SDOH — SOCIAL STABILITY: SOCIAL INSECURITY: WERE YOU ABLE TO COMPLETE ALL THE BEHAVIORAL HEALTH SCREENINGS?: YES

## 2023-11-15 ASSESSMENT — ACTIVITIES OF DAILY LIVING (ADL)
BATHING: INDEPENDENT
TOILETING: INDEPENDENT
HEARING - LEFT EAR: FUNCTIONAL
LACK_OF_TRANSPORTATION: NO
ADEQUATE_TO_COMPLETE_ADL: YES
HEARING - RIGHT EAR: FUNCTIONAL
WALKS IN HOME: INDEPENDENT
JUDGMENT_ADEQUATE_SAFELY_COMPLETE_DAILY_ACTIVITIES: YES
FEEDING YOURSELF: INDEPENDENT
GROOMING: INDEPENDENT
PATIENT'S MEMORY ADEQUATE TO SAFELY COMPLETE DAILY ACTIVITIES?: YES
DRESSING YOURSELF: INDEPENDENT

## 2023-11-15 ASSESSMENT — PAIN - FUNCTIONAL ASSESSMENT
PAIN_FUNCTIONAL_ASSESSMENT: 0-10
PAIN_FUNCTIONAL_ASSESSMENT: VAS (VISUAL ANALOG SCALE)
PAIN_FUNCTIONAL_ASSESSMENT: 0-10
PAIN_FUNCTIONAL_ASSESSMENT: VAS (VISUAL ANALOG SCALE)
PAIN_FUNCTIONAL_ASSESSMENT: 0-10
PAIN_FUNCTIONAL_ASSESSMENT: VAS (VISUAL ANALOG SCALE)

## 2023-11-15 ASSESSMENT — PATIENT HEALTH QUESTIONNAIRE - PHQ9
2. FEELING DOWN, DEPRESSED OR HOPELESS: NOT AT ALL
1. LITTLE INTEREST OR PLEASURE IN DOING THINGS: NOT AT ALL
SUM OF ALL RESPONSES TO PHQ9 QUESTIONS 1 & 2: 0

## 2023-11-15 ASSESSMENT — COGNITIVE AND FUNCTIONAL STATUS - GENERAL
PATIENT BASELINE BEDBOUND: NO
WALKING IN HOSPITAL ROOM: A LITTLE
CLIMB 3 TO 5 STEPS WITH RAILING: A LITTLE
CLIMB 3 TO 5 STEPS WITH RAILING: A LITTLE
DAILY ACTIVITIY SCORE: 24
MOBILITY SCORE: 22
DAILY ACTIVITIY SCORE: 24
MOBILITY SCORE: 22
WALKING IN HOSPITAL ROOM: A LITTLE

## 2023-11-15 ASSESSMENT — LIFESTYLE VARIABLES
HOW OFTEN DO YOU HAVE A DRINK CONTAINING ALCOHOL: NEVER
HOW OFTEN DO YOU HAVE 6 OR MORE DRINKS ON ONE OCCASION: NEVER
AUDIT-C TOTAL SCORE: 0
AUDIT-C TOTAL SCORE: 0
HOW MANY STANDARD DRINKS CONTAINING ALCOHOL DO YOU HAVE ON A TYPICAL DAY: PATIENT DOES NOT DRINK
SKIP TO QUESTIONS 9-10: 1

## 2023-11-15 ASSESSMENT — PAIN SCALES - GENERAL
PAINLEVEL_OUTOF10: 8
PAINLEVEL_OUTOF10: 5 - MODERATE PAIN
PAINLEVEL_OUTOF10: 5 - MODERATE PAIN
PAINLEVEL_OUTOF10: 8
PAINLEVEL_OUTOF10: 0 - NO PAIN
PAINLEVEL_OUTOF10: 8
PAINLEVEL_OUTOF10: 2
PAINLEVEL_OUTOF10: 5 - MODERATE PAIN
PAINLEVEL_OUTOF10: 5 - MODERATE PAIN

## 2023-11-15 ASSESSMENT — COLUMBIA-SUICIDE SEVERITY RATING SCALE - C-SSRS
6. HAVE YOU EVER DONE ANYTHING, STARTED TO DO ANYTHING, OR PREPARED TO DO ANYTHING TO END YOUR LIFE?: NO
2. HAVE YOU ACTUALLY HAD ANY THOUGHTS OF KILLING YOURSELF?: NO
1. IN THE PAST MONTH, HAVE YOU WISHED YOU WERE DEAD OR WISHED YOU COULD GO TO SLEEP AND NOT WAKE UP?: NO

## 2023-11-15 ASSESSMENT — PAIN DESCRIPTION - LOCATION
LOCATION: ABDOMEN
LOCATION: ABDOMEN

## 2023-11-15 NOTE — OP NOTE
Laparoscopic Sigmoid Colectomy; Colorectal Anastomosis Operative Note     Date: 11/15/2023  OR Location: [facility] OR    Name: Marisabel Richardson, : 1950, Age: 73 y.o., MRN: [id number], Sex: female    Diagnosis  Pre-op Diagnosis     * Mass of colon [K63.89] Post-op Diagnosis     * Mass of colon [K63.89]     Procedures  Laparoscopic Sigmoid Colectomy; Colorectal Anastomosis  17992 - CA LAPAROSCOPY COLECTOMY PARTIAL W/ANASTOMOSIS  1.  Laparoscopic sigmoid colectomy with colorectal anastomosis  2.  Laparoscopic tap block    Surgeons      * Lev Brothers - Primary    Resident/Fellow/Other Assistant:  Surgeon(s) and Role:    Procedure Summary  Anesthesia: General  ASA: II  Anesthesia Staff: Anesthesiologist: Guillermo Ayers MD  C-AA: KARINE Valadez  Estimated Blood Loss: 10mL  Intra-op Medications:   Medication Name Total Dose   bupivacaine PF (Marcaine) 0.5 % (5 mg/mL) injection 46 mL   sodium chloride 0.9 % irrigation solution 1,000 mL   metroNIDAZOLE in NaCl (iso-os) (Flagyl)  mg Cannot be calculated              Anesthesia Record               Intraprocedure I/O Totals          Intake    LR 1000.00 mL    Propofol Drip 0.00 mL    The total shown is the total volume documented since Anesthesia Start was filed.    ceFAZolin (Ancef) IVPB 2 g/50 mL D5 (premix) 10.00 mL    metroNIDAZOLE in NaCl (iso-os) (Flagyl)  mg 100.00 mL    Total Intake 1110 mL       Output    Urine 100 mL    NG/OG Tube Output 100 mL    Total Output 200 mL       Net    Net Volume 910 mL          Specimen:   ID Type Source Tests Collected by Time   1 : SIGMOID COLON; PROXIMAL DONUT WITH STITCH AND DISTAL DONUT WITH STAPLE Tissue COLON - SIGMOID BIOPSY SURGICAL PATHOLOGY EXAM Lev Brothers MD 11/15/2023 1115        Staff:   Circulator: Tara Alfaro RN  Relief Circulator: Aileen Buitrago RN  Scrub Person: Varun Marshall; Gloria Brandon RN         Drains and/or Catheters:   Urethral Catheter Non-latex 16 Fr. (Active)        Tourniquet Times:         Implants:     Findings: Large mass in the mid sigmoid.  Multiple adenomatous appearing polyps in the rectum and descending colon on flexible sigmoidoscopy    Indications: Marisabel Richardson is an 73 y.o. female who is having surgery for Mass of colon [K63.89].     The patient was seen in the preoperative area. The risks, benefits, complications, treatment options, non-operative alternatives, expected recovery and outcomes were discussed with the patient. The possibilities of reaction to medication, pulmonary aspiration, injury to surrounding structures, bleeding, recurrent infection, the need for additional procedures, failure to diagnose a condition, and creating a complication requiring transfusion or operation were discussed with the patient. The patient concurred with the proposed plan, giving informed consent.  The site of surgery was properly noted/marked if necessary per policy. The patient has been actively warmed in preoperative area. Preoperative antibiotics have been ordered and given within 1 hours of incision. Venous thrombosis prophylaxis have been ordered including bilateral sequential compression devices and chemical prophylaxis    Procedure Details:   Patient was identified in the preoperative area and transported to the operating room.  She was placed supine on the or table.  General anesthesia was induced.  She was then repositioned in split leg with appropriate padding.  Yang catheter, SCDs, OG were placed.  Abdomen was clipped, prepped, and draped in the usual sterile fashion.  Time-out was performed confirming patient, procedure, and perioperative criteria.  Veress needle was inserted at marcus's point with a good drop test.  Abdomen was insufflated to 15 millimeters mercury with low initial pressures.  5 millimeter Visiport was placed in supraumbilical region.  Camera was inserted and there was a small pinhole nick to the left lobe of the liver from the Veress  needle which had stopped bleeding prior to camera insertion.  5 millimeter ports were then placed in the right upper and lower quadrants under visualization.  Abdomen was surveyed and liver was grossly normal.  Omentum was tucked cephalad and small bowel was swept to the right upper quadrant.  Sigmoid colon was identified and fairly adherent to the left pelvis likely from patient's prior hysterectomy.  There is tattoo in the distal colon and a large palpable mass in the mid sigmoid colon.  Lateral adhesions were taken down sharply until this was able to be placed in normal anatomic position.  We began laterally along the white line of Toldt and took this down to identify the left ureter.  This was carried up to the descending colon.  Once the colon was medialized moved to the right peritoneum.  This was scored and a window was developed inferior to the NATASHA.  Again the left ureter was identified and protected.  The NATASHA was taken and a high ligation with energy.  Mesentery of the rectosigmoid was at least 5 cm distal to the mass.  This was thinned with the energy device.  Right lower quadrant port was upsized to 12 millimeters and the proximal rectum was then divided using 1 firing of the 60 millimeter green load Endo-DEVIN stapler.  Pfannenstiel incision was created above the pubic symphysis.  Anterior sheath was scored in a transverse fashion.  A plane was developed above the rectus muscles.  The peritoneum was elevated and entered sharply without injury.  Small Asher wound retractor was placed. .  Colon was delivered through the incision and the remaining mesentery was divided with cautery. Descending colon was clamped and divided between a Bainbridge and Kocher.  An 0 Prolene pursestring was placed.  A 29 millimeter EEA stapler was selected and the anvil was secured.  Descending colon was then placed back in the abdominal cavity and we returned to laparoscopy. Abdomen was reinsufflated and reach was evaluated.  The  descending colon and anvil reached the rectal staple line easily and without tension or twisting.  The EEA handle was then placed transanally and manipulated up to the rectal staple line.  Tyrone was brought out through the staple line and attached to the anvil.  Stapler was fired without issue.  Donuts were assessed and intact.  Flexible sigmoidoscopy was performed.  Anastomosis was patent and hemostatic.  Leak test was performed and negative.  Of note on flexible sigmoidoscopy there were at least 15-20 polyps identified within the rectum and a few proximal to the anastomosis.  The majority did not appear hyperplastic but some appeared adenomatous.  Abdominal fluid was then aspirated and bowel was returned to normal anatomic position.  Intraoperative tap block was performed in 4 quadrants using a total of 40 milliliters of 0.25% Marcaine.  Right lower quadrant port site was closed with 0 Vicryl using a Judson Sorenson suture Passer.  Abdomen was surveyed and hemostatic.  Abdomen was desufflated and the supraumbilical fascia was closed with 1 0 PDS in running fashion.  Subcutaneous tissues were irrigated and skin was closed with 4-0 Monocryl in a subcuticular fashion followed by skin glue.  Prior to complete closure all counts were confirmed correct.  Patient was then extubated and transferred to the PACU in stable condition.   Complications:  None; patient tolerated the procedure well.    Disposition: PACU - hemodynamically stable.  Condition: stable         Additional Details:     Attending Attestation:     Lev Brothers  Phone Number: [phone]

## 2023-11-15 NOTE — ANESTHESIA POSTPROCEDURE EVALUATION
Patient: Marisabel Richardson    Procedure Summary       Date: 11/15/23 Room / Location: [facility] / [facility]    Anesthesia Start: 0900 Anesthesia Stop: 1228    Procedure: Laparoscopic Sigmoid Colectomy; Colorectal Anastomosis (Abdomen) Diagnosis:       Mass of colon      (Mass of colon [K63.89])    Surgeons: Lev Brothers MD Responsible Provider: Guillermo Ayers MD    Anesthesia Type: general ASA Status: 2            Anesthesia Type: general    Vitals Value Taken Time   /64 11/15/23 1226   Temp 36 °C (96.8 °F) 11/15/23 1226   Pulse 76 11/15/23 1226   Resp 12 11/15/23 1226   SpO2 100 % 11/15/23 1226       Anesthesia Post Evaluation    Patient location during evaluation: bedside  Patient participation: complete - patient participated  Level of consciousness: awake and alert  Pain management: satisfactory to patient  Airway patency: patent  Cardiovascular status: acceptable  Respiratory status: acceptable  Hydration status: acceptable  Postoperative Nausea and Vomiting: none  Comments: No apparent complications.        No notable events documented.

## 2023-11-15 NOTE — ANESTHESIA PROCEDURE NOTES
Peripheral IV  Date/Time: 11/15/2023 9:30 AM      Placement  Needle size: 18 G  Laterality: left  Location: hand  Local anesthetic: none  Site prep: alcohol  Technique: anatomical landmarks

## 2023-11-15 NOTE — ANESTHESIA PROCEDURE NOTES
Airway  Date/Time: 11/15/2023 9:20 AM  Urgency: elective    Airway not difficult    Staffing  Performed: KARINE   Authorized by: Guillermo Ayers MD    Performed by: KARINE Valadez  Patient location during procedure: OR    Indications and Patient Condition  Indications for airway management: anesthesia  Spontaneous Ventilation: absent  Sedation level: deep  Preoxygenated: yes  Patient position: sniffing  MILS maintained throughout  Mask difficulty assessment: 1 - vent by mask    Final Airway Details  Final airway type: endotracheal airway      Successful airway: ETT  Cuffed: yes   Successful intubation technique: direct laryngoscopy  Blade: Eve  Blade size: #3  ETT size (mm): 7.0  Cormack-Lehane Classification: grade IIb - view of arytenoids or posterior of glottis only  Placement verified by: chest auscultation and capnometry   Measured from: lips  Number of attempts at approach: 1

## 2023-11-15 NOTE — ANESTHESIA PREPROCEDURE EVALUATION
Patient: Marisabel Richardson    Procedure Information       Date/Time: 11/15/23 0830    Procedure: Laparoscopic Sigmoid Colectomy; Colorectal Anastomosis    Location: [facility] / [facility]    Surgeons: Lev Brothers MD            Relevant Problems   Anesthesia (within normal limits)      Cardiovascular   (+) Hyperlipidemia      Endocrine   (+) Hypothyroidism      /Renal   (+) Liver cyst   (+) Renal lesion      Neuro/Psych   (+) Anxiety   (+) Dementia (CMS/HCC)       Clinical information reviewed:    Allergies  Meds               NPO Detail:  NPO/Void Status  Date of Last Liquid: 11/14/23  Time of Last Liquid: 2200  Date of Last Solid: 11/14/23  Time of Last Solid: 2200         Physical Exam    Airway  Mallampati: III     Cardiovascular    Dental    Pulmonary    Abdominal            Anesthesia Plan    ASA 2     general     intravenous induction   Anesthetic plan and risks discussed with patient.

## 2023-11-16 LAB
ANION GAP SERPL CALC-SCNC: 12 MMOL/L (ref 10–20)
BUN SERPL-MCNC: 11 MG/DL (ref 6–23)
CALCIUM SERPL-MCNC: 8.7 MG/DL (ref 8.6–10.3)
CHLORIDE SERPL-SCNC: 103 MMOL/L (ref 98–107)
CO2 SERPL-SCNC: 26 MMOL/L (ref 21–32)
CREAT SERPL-MCNC: 0.82 MG/DL (ref 0.5–1.05)
ERYTHROCYTE [DISTWIDTH] IN BLOOD BY AUTOMATED COUNT: 12.8 % (ref 11.5–14.5)
GFR SERPL CREATININE-BSD FRML MDRD: 77 ML/MIN/1.73M*2
GLUCOSE SERPL-MCNC: 93 MG/DL (ref 74–99)
HCT VFR BLD AUTO: 40.6 % (ref 36–46)
HGB BLD-MCNC: 13.4 G/DL (ref 12–16)
MCH RBC QN AUTO: 31.8 PG (ref 26–34)
MCHC RBC AUTO-ENTMCNC: 33 G/DL (ref 32–36)
MCV RBC AUTO: 96 FL (ref 80–100)
NRBC BLD-RTO: 0 /100 WBCS (ref 0–0)
PLATELET # BLD AUTO: 212 X10*3/UL (ref 150–450)
POTASSIUM SERPL-SCNC: 3.9 MMOL/L (ref 3.5–5.3)
RBC # BLD AUTO: 4.21 X10*6/UL (ref 4–5.2)
SODIUM SERPL-SCNC: 137 MMOL/L (ref 136–145)
STAPHYLOCOCCUS SPEC CULT: NORMAL
WBC # BLD AUTO: 9.8 X10*3/UL (ref 4.4–11.3)

## 2023-11-16 PROCEDURE — 1100000001 HC PRIVATE ROOM DAILY

## 2023-11-16 PROCEDURE — 97161 PT EVAL LOW COMPLEX 20 MIN: CPT | Mod: GP

## 2023-11-16 PROCEDURE — 2500000001 HC RX 250 WO HCPCS SELF ADMINISTERED DRUGS (ALT 637 FOR MEDICARE OP): Performed by: NURSE PRACTITIONER

## 2023-11-16 PROCEDURE — 80048 BASIC METABOLIC PNL TOTAL CA: CPT | Performed by: SURGERY

## 2023-11-16 PROCEDURE — 36415 COLL VENOUS BLD VENIPUNCTURE: CPT | Performed by: SURGERY

## 2023-11-16 PROCEDURE — 9420000001 HC RT PATIENT EDUCATION 5 MIN

## 2023-11-16 PROCEDURE — 2500000004 HC RX 250 GENERAL PHARMACY W/ HCPCS (ALT 636 FOR OP/ED): Performed by: SURGERY

## 2023-11-16 PROCEDURE — 96372 THER/PROPH/DIAG INJ SC/IM: CPT | Performed by: SURGERY

## 2023-11-16 PROCEDURE — 97165 OT EVAL LOW COMPLEX 30 MIN: CPT | Mod: GO

## 2023-11-16 PROCEDURE — 2500000001 HC RX 250 WO HCPCS SELF ADMINISTERED DRUGS (ALT 637 FOR MEDICARE OP)

## 2023-11-16 PROCEDURE — 99231 SBSQ HOSP IP/OBS SF/LOW 25: CPT | Performed by: NURSE PRACTITIONER

## 2023-11-16 PROCEDURE — 2500000001 HC RX 250 WO HCPCS SELF ADMINISTERED DRUGS (ALT 637 FOR MEDICARE OP): Performed by: SURGERY

## 2023-11-16 PROCEDURE — 85027 COMPLETE CBC AUTOMATED: CPT | Performed by: SURGERY

## 2023-11-16 RX ORDER — METHOCARBAMOL 500 MG/1
500 TABLET, FILM COATED ORAL ONCE
Status: COMPLETED | OUTPATIENT
Start: 2023-11-16 | End: 2023-11-16

## 2023-11-16 RX ADMIN — PHENAZOPYRIDINE 100 MG: 100 TABLET ORAL at 16:18

## 2023-11-16 RX ADMIN — LEVOTHYROXINE SODIUM 25 MCG: 25 TABLET ORAL at 06:06

## 2023-11-16 RX ADMIN — SERTRALINE HYDROCHLORIDE 100 MG: 50 TABLET ORAL at 09:47

## 2023-11-16 RX ADMIN — AMITRIPTYLINE HYDROCHLORIDE 25 MG: 25 TABLET, FILM COATED ORAL at 21:28

## 2023-11-16 RX ADMIN — OXYCODONE HYDROCHLORIDE 10 MG: 5 TABLET ORAL at 09:47

## 2023-11-16 RX ADMIN — OXYCODONE HYDROCHLORIDE 10 MG: 5 TABLET ORAL at 03:48

## 2023-11-16 RX ADMIN — ENOXAPARIN SODIUM 40 MG: 40 INJECTION SUBCUTANEOUS at 09:47

## 2023-11-16 RX ADMIN — AMITRIPTYLINE HYDROCHLORIDE 25 MG: 25 TABLET, FILM COATED ORAL at 09:47

## 2023-11-16 RX ADMIN — METHOCARBAMOL TABLETS 500 MG: 500 TABLET, COATED ORAL at 10:18

## 2023-11-16 RX ADMIN — SIMVASTATIN 40 MG: 40 TABLET, FILM COATED ORAL at 21:28

## 2023-11-16 RX ADMIN — GABAPENTIN 300 MG: 300 CAPSULE ORAL at 21:28

## 2023-11-16 RX ADMIN — GABAPENTIN 300 MG: 300 CAPSULE ORAL at 09:47

## 2023-11-16 RX ADMIN — PHENAZOPYRIDINE 100 MG: 100 TABLET ORAL at 09:47

## 2023-11-16 RX ADMIN — GABAPENTIN 300 MG: 300 CAPSULE ORAL at 16:19

## 2023-11-16 RX ADMIN — OXYCODONE HYDROCHLORIDE 10 MG: 5 TABLET ORAL at 16:18

## 2023-11-16 RX ADMIN — AMITRIPTYLINE HYDROCHLORIDE 25 MG: 25 TABLET, FILM COATED ORAL at 16:19

## 2023-11-16 SDOH — ECONOMIC STABILITY: INCOME INSECURITY: IN THE PAST 12 MONTHS, HAS THE ELECTRIC, GAS, OIL, OR WATER COMPANY THREATENED TO SHUT OFF SERVICE IN YOUR HOME?: NO

## 2023-11-16 SDOH — HEALTH STABILITY: MENTAL HEALTH: HOW OFTEN DO YOU HAVE 6 OR MORE DRINKS ON ONE OCCASION?: NEVER

## 2023-11-16 SDOH — ECONOMIC STABILITY: TRANSPORTATION INSECURITY
IN THE PAST 12 MONTHS, HAS THE LACK OF TRANSPORTATION KEPT YOU FROM MEDICAL APPOINTMENTS OR FROM GETTING MEDICATIONS?: NO

## 2023-11-16 SDOH — ECONOMIC STABILITY: INCOME INSECURITY: IN THE LAST 12 MONTHS, WAS THERE A TIME WHEN YOU WERE NOT ABLE TO PAY THE MORTGAGE OR RENT ON TIME?: NO

## 2023-11-16 SDOH — ECONOMIC STABILITY: INCOME INSECURITY: HOW HARD IS IT FOR YOU TO PAY FOR THE VERY BASICS LIKE FOOD, HOUSING, MEDICAL CARE, AND HEATING?: NOT HARD AT ALL

## 2023-11-16 SDOH — ECONOMIC STABILITY: TRANSPORTATION INSECURITY
IN THE PAST 12 MONTHS, HAS LACK OF TRANSPORTATION KEPT YOU FROM MEETINGS, WORK, OR FROM GETTING THINGS NEEDED FOR DAILY LIVING?: NO

## 2023-11-16 SDOH — ECONOMIC STABILITY: FOOD INSECURITY: WITHIN THE PAST 12 MONTHS, YOU WORRIED THAT YOUR FOOD WOULD RUN OUT BEFORE YOU GOT MONEY TO BUY MORE.: NEVER TRUE

## 2023-11-16 SDOH — SOCIAL STABILITY: SOCIAL INSECURITY: WITHIN THE LAST YEAR, HAVE YOU BEEN HUMILIATED OR EMOTIONALLY ABUSED IN OTHER WAYS BY YOUR PARTNER OR EX-PARTNER?: NO

## 2023-11-16 SDOH — SOCIAL STABILITY: SOCIAL INSECURITY: WITHIN THE LAST YEAR, HAVE YOU BEEN AFRAID OF YOUR PARTNER OR EX-PARTNER?: NO

## 2023-11-16 SDOH — ECONOMIC STABILITY: HOUSING INSECURITY
IN THE LAST 12 MONTHS, WAS THERE A TIME WHEN YOU DID NOT HAVE A STEADY PLACE TO SLEEP OR SLEPT IN A SHELTER (INCLUDING NOW)?: NO

## 2023-11-16 SDOH — HEALTH STABILITY: MENTAL HEALTH: HOW MANY STANDARD DRINKS CONTAINING ALCOHOL DO YOU HAVE ON A TYPICAL DAY?: PATIENT DOES NOT DRINK

## 2023-11-16 SDOH — SOCIAL STABILITY: SOCIAL INSECURITY
WITHIN THE LAST YEAR, HAVE YOU BEEN KICKED, HIT, SLAPPED, OR OTHERWISE PHYSICALLY HURT BY YOUR PARTNER OR EX-PARTNER?: NO

## 2023-11-16 SDOH — HEALTH STABILITY: MENTAL HEALTH: HOW OFTEN DO YOU HAVE A DRINK CONTAINING ALCOHOL?: NEVER

## 2023-11-16 SDOH — ECONOMIC STABILITY: HOUSING INSECURITY: IN THE LAST 12 MONTHS, HOW MANY PLACES HAVE YOU LIVED?: 1

## 2023-11-16 SDOH — ECONOMIC STABILITY: FOOD INSECURITY: WITHIN THE PAST 12 MONTHS, THE FOOD YOU BOUGHT JUST DIDN'T LAST AND YOU DIDN'T HAVE MONEY TO GET MORE.: NEVER TRUE

## 2023-11-16 SDOH — SOCIAL STABILITY: SOCIAL INSECURITY
WITHIN THE LAST YEAR, HAVE TO BEEN RAPED OR FORCED TO HAVE ANY KIND OF SEXUAL ACTIVITY BY YOUR PARTNER OR EX-PARTNER?: NO

## 2023-11-16 SDOH — SOCIAL STABILITY: SOCIAL NETWORK: ARE YOU MARRIED, WIDOWED, DIVORCED, SEPARATED, NEVER MARRIED, OR LIVING WITH A PARTNER?: MARRIED

## 2023-11-16 ASSESSMENT — ENCOUNTER SYMPTOMS
NAUSEA: 0
ACTIVITY IMPAIRMENT: RETURNING TO NORMAL
VOMITING: 0
DIETARY ISSUES: ADEQUATE INTAKE
SUBJECTIVE PATIENT PAIN CONTROL: WELL CONTROLLED

## 2023-11-16 ASSESSMENT — COGNITIVE AND FUNCTIONAL STATUS - GENERAL
MOVING TO AND FROM BED TO CHAIR: A LITTLE
DRESSING REGULAR UPPER BODY CLOTHING: A LITTLE
WALKING IN HOSPITAL ROOM: A LITTLE
EATING MEALS: A LITTLE
CLIMB 3 TO 5 STEPS WITH RAILING: A LITTLE
TOILETING: A LITTLE
HELP NEEDED FOR BATHING: A LITTLE
DAILY ACTIVITIY SCORE: 19
DAILY ACTIVITIY SCORE: 24
TURNING FROM BACK TO SIDE WHILE IN FLAT BAD: A LITTLE
HELP NEEDED FOR BATHING: A LITTLE
TOILETING: A LITTLE
MOBILITY SCORE: 24
DRESSING REGULAR LOWER BODY CLOTHING: A LITTLE
DAILY ACTIVITIY SCORE: 18
MOBILITY SCORE: 24
PERSONAL GROOMING: A LITTLE
MOVING FROM LYING ON BACK TO SITTING ON SIDE OF FLAT BED WITH BEDRAILS: A LITTLE
STANDING UP FROM CHAIR USING ARMS: A LITTLE
PERSONAL GROOMING: A LITTLE
MOBILITY SCORE: 18
DRESSING REGULAR LOWER BODY CLOTHING: A LITTLE
DRESSING REGULAR UPPER BODY CLOTHING: A LITTLE

## 2023-11-16 ASSESSMENT — PAIN - FUNCTIONAL ASSESSMENT
PAIN_FUNCTIONAL_ASSESSMENT: 0-10

## 2023-11-16 ASSESSMENT — LIFESTYLE VARIABLES
SKIP TO QUESTIONS 9-10: 1
AUDIT-C TOTAL SCORE: 0

## 2023-11-16 ASSESSMENT — PAIN SCALES - GENERAL
PAINLEVEL_OUTOF10: 10 - WORST POSSIBLE PAIN
PAINLEVEL_OUTOF10: 0 - NO PAIN
PAINLEVEL_OUTOF10: 7
PAINLEVEL_OUTOF10: 7
PAINLEVEL_OUTOF10: 9

## 2023-11-16 ASSESSMENT — ACTIVITIES OF DAILY LIVING (ADL)
ADL_ASSISTANCE: INDEPENDENT
ADL_ASSISTANCE: INDEPENDENT
LACK_OF_TRANSPORTATION: NO
BATHING_ASSISTANCE: STAND BY

## 2023-11-16 NOTE — PROGRESS NOTES
Occupational Therapy    Evaluation    Patient Name: Marisabel Richardson  MRN: [id number]  Today's Date: 11/16/2023  Time Calculation  Start Time: 0820  Stop Time: 0842  Time Calculation (min): 22 min    Assessment  IP OT Assessment  OT Assessment: Patient is pleasant and cooperative and motivated to regain prior level of independence. Patient with abdominal discomfort with transitional movements, however does well with ADLs and transfers. Low intensity therapy to maximize functional independence with ADLs.  Prognosis: Good  Medical Staff Made Aware: Yes  End of Session Communication: Bedside nurse  End of Session Patient Position: Up in chair, Alarm on  Plan:  Treatment Interventions: ADL retraining, Functional transfer training, Endurance training, Patient/family training, Equipment evaluation/education, Neuromuscular reeducation  OT Frequency: 3 times per week  OT Discharge Recommendations: Low intensity level of continued care  General:  General  Reason for Referral: Mass of Colon, Laparoscopic Sigmoid Colectomy  Past Medical History Relevant to Rehab: Hyperlipidemia, Dementia, Anxiety, hypothyroidism, Renal Lesion, Liver Cyst.  Prior to Session Communication: Bedside nurse  Patient Position Received: Bed, 3 rail up  General Comment: Patient slightly impulsive with increased time required to complete tasks.    Precautions:  Medical Precautions: Abdominal precautions    Pain:  Pain Assessment  Pain Assessment: 0-10  Pain Score: 10 - Worst possible pain  Pain Location: Abdomen    Home Living:  Type of Home:  (Patient lives in [place] home with 1 step, bed/bath on 1st floor, walk in shower, raised toilet seat.)  Lives With: Spouse   Prior Function:  Level of [place]: Independent with ADLs and functional transfers  ADL Assistance: Independent  Homemaking Assistance: Independent  Ambulatory Assistance: Independent  IADL History:  Homemaking Responsibilities: Yes  Meal Prep Responsibility: Primary  Laundry  Responsibility: Primary  Cleaning Responsibility: Primary  ADL:  Eating Assistance: Independent  Grooming Assistance: Stand by  Bathing Assistance: Stand by  UE Dressing Assistance: Stand by  LE Dressing Assistance: Stand by  Toileting Assistance with Device: Stand by  Functional Assistance: Stand by  Activity Tolerance:  Endurance: Tolerates 10 - 20 min exercise with multiple rests  Activity Tolerance Comments: Good activity tolerance.  Bed Mobility/Transfers: Bed Mobility  Bed Mobility: Yes  Bed Mobility 1  Bed Mobility 1: Supine to sitting  Level of Assistance 1: Minimum assistance  Bed Mobility Comments 1:  (Assist with upper trunk.)  Bed Mobility 2  Bed Mobility  2: Sitting to supine  Level of Assistance 2: Contact guard  Bed Mobility Comments 2: via log roll    Transfers  Transfer: Yes  Transfer 1  Transfer From 1: Bed to  Transfer to 1: Chair with arms  Technique 1: Sit to stand  Transfer Device 1:  (No assistive device.)  Transfer Level of Assistance 1: Contact guard  Trials/Comments 1: Cues for hand placment    Sitting Balance:     Standing Balance:  Dynamic Standing Balance  Dynamic Standing-Balance Support: No upper extremity supported  Dynamic Standing-Balance: Reaching for objects  Dynamic Standing-Comments: CGA  Modalities:     IADL's:   Homemaking Responsibilities: Yes  Meal Prep Responsibility: Primary  Laundry Responsibility: Primary  Cleaning Responsibility: Primary  Vision: Vision - Basic Assessment  Current Vision: No visual deficits    Strength:  Strength Comments: 5/5 Throughout    Outcome Measures: [facility] Daily Activity  Putting on and taking off regular lower body clothing: A little  Bathing (including washing, rinsing, drying): A little  Putting on and taking off regular upper body clothing: A little  Toileting, which includes using toilet, bedpan or urinal: A little  Taking care of personal grooming such as brushing teeth: A little  Eating Meals: None  Daily Activity - Total Score:  19      Education Documentation  Precautions, taught by Mark Jackson OT at 11/16/2023 10:37 AM.  Learner: Patient  Readiness: Acceptance  Method: Explanation  Response: Verbalizes Understanding  Comment: Patient educated regarding abdominal precautions and bed mobility via log roll and verbalized understanding.    ADL Training, taught by Mark Jackson OT at 11/16/2023 10:37 AM.  Learner: Patient  Readiness: Acceptance  Method: Explanation  Response: Verbalizes Understanding  Comment: Patient educated regarding abdominal precautions and bed mobility via log roll and verbalized understanding.    Education Comments  No comments found.      Goals:   Encounter Problems       Encounter Problems (Active)       ADLs       Patient will perform UB and LB bathing with independent level of assistance.        Start:  11/16/23    Expected End:  11/30/23            Patient with complete upper body dressing with independent level of assistance donning and doffing all UE clothes with no adaptive equipment while edge of bed        Start:  11/16/23    Expected End:  11/30/23            Patient with complete lower body dressing with independent level of assistance donning and doffing all LE clothes  with no adaptive equipment while edge of bed        Start:  11/16/23    Expected End:  11/30/23            Patient will complete daily grooming tasks brushing teeth with independent level of assistance and PRN adaptive equipment while edge of bed .       Start:  11/16/23    Expected End:  11/30/23            Patient will complete toileting including hygiene clothing management/hygiene with independent level of assistance and raised toilet seat.       Start:  11/16/23    Expected End:  11/30/23               BALANCE       Pt will maintain dynamic standing balance during ADL task with independent level of assistance in order to demonstrate decreased risk of falling and improved postural control.       Start:  11/16/23     Expected End:  11/30/23               TRANSFERS       Patient will perform bed mobility independent level of assistance and bed rails in order to improve safety and independence with mobility       Start:  11/16/23    Expected End:  11/30/23            Patient will complete functional transfer to all surfaces with least restrictive device with independent level of assistance.       Start:  11/16/23    Expected End:  11/30/23

## 2023-11-16 NOTE — PROGRESS NOTES
11/16/23 1447   Discharge Planning   Living Arrangements Spouse/significant other   Support Systems Spouse/significant other   Assistance Needed none   Type of Residence Private residence   Home or Post Acute Services None   Patient expects to be discharged to: home   Does the patient need discharge transport arranged? No   Financial Resource Strain   How hard is it for you to pay for the very basics like food, housing, medical care, and heating? Not hard   Housing Stability   In the last 12 months, was there a time when you were not able to pay the mortgage or rent on time? N   In the last 12 months, how many places have you lived? 1   In the last 12 months, was there a time when you did not have a steady place to sleep or slept in a shelter (including now)? N   Transportation Needs   In the past 12 months, has lack of transportation kept you from medical appointments or from getting medications? no   In the past 12 months, has lack of transportation kept you from meetings, work, or from getting things needed for daily living? No     Patient lives at home with her  in a house.  She is independent with ADLs and drives at baseline.  She does not use any assistive devices for ambulation.  She plans on going home at discharge and does not anticipate any discharge needs.  Selma Rausch RN [facility]

## 2023-11-16 NOTE — CARE PLAN
Problem: Fall/Injury  Goal: Not fall by end of shift  Outcome: Progressing  Goal: Be free from injury by end of the shift  Outcome: Progressing  Goal: Verbalize understanding of personal risk factors for fall in the hospital  Outcome: Progressing  Goal: Verbalize understanding of risk factor reduction measures to prevent injury from fall in the home  Outcome: Progressing  Goal: Use assistive devices by end of the shift  Outcome: Progressing  Goal: Pace activities to prevent fatigue by end of the shift  Outcome: Progressing     Problem: Pain  Goal: Takes deep breaths with improved pain control throughout the shift  Outcome: Progressing  Goal: Turns in bed with improved pain control throughout the shift  Outcome: Progressing  Goal: Walks with improved pain control throughout the shift  Outcome: Progressing  Goal: Performs ADL's with improved pain control throughout shift  Outcome: Progressing  Goal: Participates in PT with improved pain control throughout the shift  Outcome: Progressing  Goal: Free from opioid side effects throughout the shift  Outcome: Progressing  Goal: Free from acute confusion related to pain meds throughout the shift  Outcome: Progressing   The patient's goals for the shift include      The clinical goals for the shift include pain control

## 2023-11-16 NOTE — PROGRESS NOTES
"Marisabel Richardson is a 73 y.o. female on day 1 of admission presenting with Mass of colon.    Subjective   Up in chair, would like to have her garnett catheter removed and walk around. Having crampy abdominal pain. Tolerating clears but belching. She denies fever, chills, CP, SOB and N/V.        Objective     Physical Exam  Constitutional:       Appearance: Normal appearance.   Eyes:      Conjunctiva/sclera: Conjunctivae normal.   Cardiovascular:      Rate and Rhythm: Normal rate and regular rhythm.      Heart sounds: Normal heart sounds.   Pulmonary:      Effort: Pulmonary effort is normal.      Breath sounds: Normal breath sounds.   Abdominal:      Palpations: Abdomen is soft.      Comments: Moderately distended, appropriately tender, Lap sites with Dermabond, C/D/I, edges well approximated, minor bruising without drainage or hematoma.     Genitourinary:     Comments: Garnett catheter with clear yellow urine in bag    Skin:     General: Skin is warm and dry.   Neurological:      Mental Status: She is oriented to person, place, and time.   Psychiatric:      Comments: Anxious appearing              Last Recorded Vitals  Blood pressure 142/60, pulse 93, temperature 37.1 °C (98.8 °F), resp. rate 18, height 1.753 m (5' 9\"), weight 62.5 kg (137 lb 11.2 oz), SpO2 95 %.  Intake/Output last 3 Shifts:  I/O last 3 completed shifts:  In: 3770 (60.4 mL/kg) [P.O.:120; I.V.:2040 (32.7 mL/kg); IV Piggyback:1610]  Out: 925 (14.8 mL/kg) [Urine:800 (0.4 mL/kg/hr); Emesis/NG output:100; Blood:25]  Weight: 62.5 kg     Scheduled medications  amitriptyline, 25 mg, oral, TID  enoxaparin, 40 mg, subcutaneous, q24h  gabapentin, 300 mg, oral, TID  levothyroxine, 25 mcg, oral, Daily  pantoprazole, 20 mg, intravenous, q24h WILBUR  phenazopyridine, 100 mg, oral, TID with meals  sertraline, 100 mg, oral, Daily  simvastatin, 40 mg, oral, Nightly      Continuous medications  lactated Ringer's, 100 mL/hr, Last Rate: 100 mL/hr (11/15/23 3996)  sodium " chloride 0.9%, 10 mL/hr  sodium chloride 0.9%, 40 mL/hr      PRN medications  PRN medications: acetaminophen, HYDROmorphone, melatonin, naloxone, oxyCODONE, oxyCODONE, oxygen, promethazine **OR** [DISCONTINUED] promethazine    Relevant Results  Results for orders placed or performed during the hospital encounter of 11/15/23 (from the past 24 hour(s))   Basic Metabolic Panel   Result Value Ref Range    Glucose 93 74 - 99 mg/dL    Sodium 137 136 - 145 mmol/L    Potassium 3.9 3.5 - 5.3 mmol/L    Chloride 103 98 - 107 mmol/L    Bicarbonate 26 21 - 32 mmol/L    Anion Gap 12 10 - 20 mmol/L    Urea Nitrogen 11 6 - 23 mg/dL    Creatinine 0.82 0.50 - 1.05 mg/dL    eGFR 77 >60 mL/min/1.73m*2    Calcium 8.7 8.6 - 10.3 mg/dL   CBC   Result Value Ref Range    WBC 9.8 4.4 - 11.3 x10*3/uL    nRBC 0.0 0.0 - 0.0 /100 WBCs    RBC 4.21 4.00 - 5.20 x10*6/uL    Hemoglobin 13.4 12.0 - 16.0 g/dL    Hematocrit 40.6 36.0 - 46.0 %    MCV 96 80 - 100 fL    MCH 31.8 26.0 - 34.0 pg    MCHC 33.0 32.0 - 36.0 g/dL    RDW 12.8 11.5 - 14.5 %    Platelets 212 150 - 450 x10*3/uL     ECG 12 Lead    Result Date: 11/14/2023  Normal sinus rhythm Normal ECG No previous ECGs available Confirmed by Alexx Sosa ([id number]) on 11/14/2023 3:34:51 PM    US renal complete    Result Date: 11/9/2023  Interpreted By:  Reggie Marcum, STUDY: US RENAL COMPLETE;  11/8/2023 10:37 am   INDICATION: Signs/Symptoms:CT scan shows A 6 mm low density exophytic focus at the upper pole right kidney demonstrates density greater than expected for simple cyst (27 HU).  ..   COMPARISON: CT of the chest, abdomen, and pelvis performed on the same date.   ACCESSION NUMBER(S): [id number]   ORDERING CLINICIAN: TERELL TEETOR   TECHNIQUE: Multiple images of the kidneys were obtained  .   FINDINGS: RIGHT KIDNEY: The right kidney measures 10.4 cm in length. The renal cortical echogenicity is within normal limits. No hydronephrosis is present. No evidence of nephrolithiasis. . Subcentimeter  hypodensities seen on comparison CT performed the same date is not detected on the current examination.   LEFT KIDNEY: The left kidney measures 11.2 cm in length. The renal cortical echogenicity is within normal limits. No hydronephrosis is present. No evidence of nephrolithiasis. .   BLADDER: Unremarkable in appearance.       Unremarkable bilateral renal ultrasound.   Signed by: Reggie Marcum 11/9/2023 11:44 AM Dictation workstation:   [id number]    CT chest abdomen pelvis w IV contrast    Result Date: 11/9/2023  Interpreted By:  Reggie Marcum, STUDY: CT CHEST ABDOMEN PELVIS W IV CONTRAST;  11/8/2023 10:27 am   INDICATION: Signs/Symptoms:hx of sigmoid mass, metastatic work up.   COMPARISON: CT abdomen pelvis performed 08/30/2023 at an outside institution.   ACCESSION NUMBER(S): [id number]   ORDERING CLINICIAN: TERELL REID   TECHNIQUE: CT of the chest, abdomen, and pelvis was performed.  Contiguous axial images were obtained at 3 mm slice thickness through the chest, abdomen and pelvis. Coronal and sagittal reconstructions at 3 mm slice thickness were performed. 75 ml of contrast Omnipaque 350 were administered intravenously without immediate complication.   FINDINGS: CHEST:   LUNG/PLEURA/LARGE AIRWAYS: No endobronchial lesion is seen.   No consolidation, pneumothorax, or effusion.   There are mild emphysematous changes of the bilateral lungs with scattered bilateral peripheral reticular changes suggestive of chronic lung findings. No suspicious parenchymal mass.   VESSELS: The thoracic aorta is unremarkable with respect course, caliber, and contour.   The main pulmonary artery and its branches are unremarkable with respect course, caliber, and contour   Coronary atherosclerotic calcifications.   HEART: Heart size within normal limits. No pericardial effusion.   MEDIASTINUM AND MARÍA: No pathologic enlarged mediastinal lymph nodes by CT criteria. Subcentimeter mediastinal lymph nodes are noted, nonspecific and  possibly reactive.   CHEST WALL AND LOWER NECK: No acute osseous abnormality. Multilevel presumed degenerative changes throughout the imaged spine. The thyroid appears enlarged and demonstrates heterogeneity suggestive underlying nodules.   ABDOMEN:   LIVER: 7 mm hypodensity within the hepatic dome, too small to characterize via CT.   BILE DUCTS: No significant biliary dilitation.   GALLBLADDER: No calcified gallstones.   PANCREAS: Unremarkable.   SPLEEN: Unremarkable.   ADRENAL GLANDS: 1.7 cm nodular density of the left adrenal gland, incompletely characterized on the current single-phase postcontrast examination. This demonstrated fat density attenuation on prior comparison CT performed at an outside institution on 08/30/2023 which is suggestive of a lipid rich adenoma.   KIDNEYS AND URETERS: The kidneys enhance symmetrically.  No hydroureteronephrosis or nephroureterolithiasis is identified.  5 mm hypodensity of the right kidney upper pole, too small to characterize via CT, however statistically likely representing a cyst.   PELVIS:   BLADDER: Unremarkable.   REPRODUCTIVE ORGANS: Surgically absent uterus.   BOWEL: Region of soft tissue density and enhancement of the sigmoid colon in similar distribution to previous comparison exam, measuring up to 5.2 x 2.7 cm on this exam and likely corresponding to patient's reported endoscopically apparent mass. No pathologic distension of visualized large or small bowel.     VESSELS: There is no aneurysmal dilatation of the abdominal aorta.   PERITONEUM/RETROPERITONEUM/LYMPH NODES: No ascites or free air, no fluid collection.  Subcentimeter short axis retroperitoneal lymph nodes, nonspecific. Subcentimeter short axis pericolonic lymph node along the left aspect of the sigmoid (series 201, image 175) measuring up to 0.7 cm.   BONE AND SOFT TISSUE: No acute osseous abnormality. Multilevel presumed degenerative changes are noted throughout the imaged spine.  Osseous structures  appear overall stable.       CHEST: 1.  No CT evidence of intrathoracic metastasis. 2. Heterogeneous goiter. Follow-up thyroid ultrasound advised for further assessment. 3. Coronary atherosclerotic calcifications.   ABDOMEN-PELVIS: 1.  Redemonstration of region of enhancement and soft tissue masslike thickening in the sigmoid colon in similar distribution to previous comparison exam and likely correlating to patient's reported endoscopically appearance mass. There is a subcentimeter short axis lymph node in the vicinity of this mass. 2. Remaining findings from outside comparison CT performed 08/30/2023 appear unchanged     Signed by: Reggie Marcum 11/9/2023 11:43 AM Dictation workstation:   [id number]       Assessment/Plan   Principal Problem:    Mass of colon  Active Problems:    Hyperlipidemia    Dementia (CMS/HCC)    Anxiety    Hypothyroidism    Colonic mass     Patient is POD #1 s/p laparoscopic sigmoid sigmoid colectomy, with colorectal anastomosis 2/2 mass in the colon     Neuro:   Hx: anxiety, depression  - OOB/ ambulate 5x per day   - continue amitriptyline, sertraline, gabapentin  - oxycodone as needed- limit narcotics if able        CV: VSS, RRR  Hx: HLD, HTN  - VS every 4 hours   - continue simvastatin      Pulm: NAD, on RA, lungs CTAB  - IS every hour while awake   - Pulse ox every 4 hours with VS     GI: abdomen soft, moderately distended, appropriately tender, Lap sites with Dermabond, C/D/I, edges well approximated, minor bruising without drainage or hematoma. +belching, -gas or BM  - continue CLD  - PRN antiemetic   - chewing gum  - ensure surgery when able to tolerate     :  garnett with clear yellow urine  - continue pyridium  - DC garnett catheter today   - PVR this afternoon  - Monitor I&Os     HEME: DVT Proph   - SCDs/ ambulate/ lovenox  - H/H WNL  - no s/s of bleeding     Endo:   Hx: hypothyroidism  - continue levothyroxine     ID: afebrile  - Monitor for s/s infection  - WBC WBL     Dispo:  garnett to be removed today, robaxin for spasms, continue CLD as patient is belching and increased bloating. Awaiting RBF. Not medically ready for DC at this time.     I spent 35 minutes in the professional and overall care of this patient.      Florence Barragan, APRN-CNP    Pain controlled, advancing diet, possible dc tomorrow if doing well, discussed need for repeat colonoscopy

## 2023-11-16 NOTE — PROGRESS NOTES
"Assessment/Plan   - Continue with current pain regimen   - Continue clear liquid diet   - PRN antiemetic   - Continue IVF   - Continue garnett catheter, plan to remove POD 1  - DVT proph: SCDs/ambulate/subcutaneous heparin   - Continue to encourage ambulation    Subjective   Subjective:   Diet: Adequate intake.  Patient reports no nausea or vomiting.    Activity level: Returning to normal.    Pain control: Well controlled.    Wound: Subjective wound complaints: Lap sites are c/d/i.      Temp:  [36 °C (96.8 °F)-37.1 °C (98.7 °F)] 36.7 °C (98 °F)  Heart Rate:  [75-98] 88  Resp:  [11-18] 18  BP: ()/(50-75) 107/55  I/O last 3 completed shifts:  In: 3770 (59.5 mL/kg) [P.O.:120; I.V.:2040 (32.2 mL/kg); IV Piggyback:1610]  Out: 225 (3.5 mL/kg) [Urine:100 (0 mL/kg/hr); Emesis/NG output:100; Blood:25]  Weight: 63.4 kg   No intake/output data recorded.    Objective   Objective:  Vital signs (most recent): Blood pressure 107/55, pulse 88, temperature 36.7 °C (98 °F), temperature source Temporal, resp. rate 18, height 1.753 m (5' 9\"), weight 63.4 kg (139 lb 12.4 oz), SpO2 95 %.  General appearance: Comfortable, well-appearing and in no acute distress.    Lungs:  Normal respiratory rate and normal effort.  Breath sounds normal.    Heart: Normal rate.  Regular rhythm.    Chest: Symmetric chest wall expansion.    Abdomen: Abdomen is soft.  There is distension.    Bowel sounds:  Bowel sounds are normal.    Tenderness: There is generalized tenderness.    Wound:  Clean.    Extremities: There is normal range of motion.    Neurological: The patient is alert and oriented to person, place and time.      Principal Problem:    Mass of colon  Active Problems:    Hyperlipidemia    Dementia (CMS/HCC)    Anxiety    Hypothyroidism    Colonic mass      "

## 2023-11-16 NOTE — PROGRESS NOTES
Physical Therapy    Physical Therapy Evaluation    Patient Name: Marisabel Richardson  MRN: [id number]  Today's Date: 11/16/2023   Time Calculation  Start Time: 1435  Stop Time: 1445  Time Calculation (min): 10 min    Assessment/Plan   PT Assessment  Rehab Prognosis: Good  Evaluation/Treatment Tolerance: Patient tolerated treatment well  Medical Staff Made Aware: Yes  End of Session Communication: Bedside nurse  Assessment Comment: PT eval complete. No hands on assist needed, demo's all mobility at safe level for home going. Needing no further acute PT needs, will D/C from acute PT at this time.  End of Session Patient Position: Up in chair, Alarm off, not on at start of session  IP OR SWING BED PT PLAN  Inpatient or Swing Bed: Inpatient  PT Plan  PT Plan: PT Eval only  PT Eval Only Reason: Safe to return home  PT Frequency: PT eval only  PT Discharge Recommendations: No further acute PT  PT Recommended Transfer Status: Independent  PT - OK to Discharge: Yes      Subjective   General Visit Information:  General  Reason for Referral: s/p Abdominal sx for mass of colon:, Laparoscopic Sigmoid Colectomy  Referred By: Lev Brothers MD  Past Medical History Relevant to Rehab: Hyperlipidemia, Dementia, Anxiety, hypothyroidism, Renal Lesion, Liver Cyst.  Family/Caregiver Present: No  Prior to Session Communication: Bedside nurse  Patient Position Received: Up in chair, Alarm off, not on at start of session  General Comment: Patient slightly impulsive but moving very well, no hands on assist, tends towards splinting with BUE for support to lower abdomen.  Home Living:  Home Living  Type of Home:  (Patient lives in [place] home with 1 step, bed/bath on 1st floor, walk in shower, raised toilet seat.)  Lives With: Spouse  Prior Level of Function:  Prior Function Per Pt/Caregiver Report  Level of [place]: Independent with ADLs and functional transfers  ADL Assistance: Independent  Homemaking Assistance: Independent  Ambulatory  Assistance: Independent  Precautions:  Precautions  Medical Precautions: Abdominal precautions (Logroll)  Vital Signs:       Objective   Pain:     Cognition:  Cognition  Overall Cognitive Status: Within Functional Limits    General Assessments:                Activity Tolerance  Endurance: Tolerates 10 - 20 min exercise with multiple rests         Strength  Strength Comments: WFL for all mobility           Coordination  Movements are Fluid and Coordinated: Yes    Postural Control  Postural Control: Within Functional Limits    Static Sitting Balance  Static Sitting-Balance Support: Feet supported, No upper extremity supported  Static Sitting-Level of Assistance: Independent  Dynamic Sitting Balance  Dynamic Sitting-Balance Support: Feet supported, No upper extremity supported  Dynamic Sitting-Balance: Trunk control activities    Static Standing Balance  Static Standing-Balance Support: No upper extremity supported  Static Standing-Level of Assistance: Independent  Dynamic Standing Balance  Dynamic Standing-Balance Support: No upper extremity supported  Dynamic Standing-Balance: Turning  Functional Assessments:  Bed Mobility  Bed Mobility: No (Pt reporting retired RN, familiar with logroll, performed with OT, reports comfort and declines trial)    Transfers  Transfer: Yes  Transfer 1  Technique 1: Sit to stand, Stand to sit  Transfer Level of Assistance 1: Independent  Trials/Comments 1: Slightly increased time, splinting lower abdomen with BUE. No hands on assist, cues for breathing.    Ambulation/Gait Training  Ambulation/Gait Training Performed: Yes  Ambulation/Gait Training 1  Surface 1: Level tile  Device 1: No device  Assistance 1: Independent  Quality of Gait 1: Narrow base of support (No gross gait deviations, no hands on assist, no LOB.)  Comments/Distance (ft) 1: 50' x2    Stairs  Stairs: Yes  Stairs  Rails 1: Bilateral  Device 1: Railing  Assistance 1: Independent  Comment/Number of Steps 1: 3  (Reciprocating navigation, no LOB, use of handrails. Increased pain.)  Extremity/Trunk Assessments:  RUE   RUE : Within Functional Limits  LUE   LUE: Within Functional Limits  RLE   RLE : Within Functional Limits  LLE   LLE : Within Functional Limits  Outcome Measures:  [facility] Basic Mobility  Turning from your back to your side while in a flat bed without using bedrails: None  Moving from lying on your back to sitting on the side of a flat bed without using bedrails: None  Moving to and from bed to chair (including a wheelchair): None  Standing up from a chair using your arms (e.g. wheelchair or bedside chair): None  To walk in hospital room: None  Climbing 3-5 steps with railing: None  Basic Mobility - Total Score: 24        Education Documentation  Precautions, taught by Edwin Delgadillo PT at 11/16/2023  3:04 PM.  Learner: Patient  Readiness: Acceptance  Method: Demonstration, Explanation  Response: Demonstrated Understanding    Body Mechanics, taught by Edwin Delgadillo PT at 11/16/2023  3:04 PM.  Learner: Patient  Readiness: Acceptance  Method: Demonstration, Explanation  Response: Demonstrated Understanding    Mobility Training, taught by Edwin Delgadillo PT at 11/16/2023  3:04 PM.  Learner: Patient  Readiness: Acceptance  Method: Demonstration, Explanation  Response: Demonstrated Understanding    Education Comments  No comments found.

## 2023-11-17 VITALS
SYSTOLIC BLOOD PRESSURE: 105 MMHG | OXYGEN SATURATION: 94 % | BODY MASS INDEX: 20.06 KG/M2 | HEIGHT: 69 IN | WEIGHT: 135.4 LBS | RESPIRATION RATE: 18 BRPM | TEMPERATURE: 98.4 F | DIASTOLIC BLOOD PRESSURE: 82 MMHG | HEART RATE: 66 BPM

## 2023-11-17 LAB
ANION GAP SERPL CALC-SCNC: 10 MMOL/L (ref 10–20)
BUN SERPL-MCNC: 9 MG/DL (ref 6–23)
CALCIUM SERPL-MCNC: 8.7 MG/DL (ref 8.6–10.3)
CHLORIDE SERPL-SCNC: 103 MMOL/L (ref 98–107)
CO2 SERPL-SCNC: 30 MMOL/L (ref 21–32)
CREAT SERPL-MCNC: 0.8 MG/DL (ref 0.5–1.05)
ERYTHROCYTE [DISTWIDTH] IN BLOOD BY AUTOMATED COUNT: 12.7 % (ref 11.5–14.5)
GFR SERPL CREATININE-BSD FRML MDRD: 79 ML/MIN/1.73M*2
GLUCOSE SERPL-MCNC: 101 MG/DL (ref 74–99)
HCT VFR BLD AUTO: 39.8 % (ref 36–46)
HGB BLD-MCNC: 13.3 G/DL (ref 12–16)
MCH RBC QN AUTO: 32.3 PG (ref 26–34)
MCHC RBC AUTO-ENTMCNC: 33.4 G/DL (ref 32–36)
MCV RBC AUTO: 97 FL (ref 80–100)
NRBC BLD-RTO: 0 /100 WBCS (ref 0–0)
PLATELET # BLD AUTO: 214 X10*3/UL (ref 150–450)
POTASSIUM SERPL-SCNC: 3.6 MMOL/L (ref 3.5–5.3)
RBC # BLD AUTO: 4.12 X10*6/UL (ref 4–5.2)
SODIUM SERPL-SCNC: 139 MMOL/L (ref 136–145)
WBC # BLD AUTO: 8.6 X10*3/UL (ref 4.4–11.3)

## 2023-11-17 PROCEDURE — C9113 INJ PANTOPRAZOLE SODIUM, VIA: HCPCS | Performed by: SURGERY

## 2023-11-17 PROCEDURE — 99239 HOSP IP/OBS DSCHRG MGMT >30: CPT

## 2023-11-17 PROCEDURE — 36415 COLL VENOUS BLD VENIPUNCTURE: CPT

## 2023-11-17 PROCEDURE — 80048 BASIC METABOLIC PNL TOTAL CA: CPT

## 2023-11-17 PROCEDURE — 2500000001 HC RX 250 WO HCPCS SELF ADMINISTERED DRUGS (ALT 637 FOR MEDICARE OP): Performed by: SURGERY

## 2023-11-17 PROCEDURE — 2500000004 HC RX 250 GENERAL PHARMACY W/ HCPCS (ALT 636 FOR OP/ED): Performed by: SURGERY

## 2023-11-17 PROCEDURE — 85027 COMPLETE CBC AUTOMATED: CPT

## 2023-11-17 PROCEDURE — 2500000001 HC RX 250 WO HCPCS SELF ADMINISTERED DRUGS (ALT 637 FOR MEDICARE OP)

## 2023-11-17 PROCEDURE — 96372 THER/PROPH/DIAG INJ SC/IM: CPT | Performed by: SURGERY

## 2023-11-17 RX ORDER — OXYCODONE HYDROCHLORIDE 5 MG/1
5 TABLET ORAL EVERY 6 HOURS PRN
Qty: 20 TABLET | Refills: 0 | Status: SHIPPED | OUTPATIENT
Start: 2023-11-17 | End: 2023-11-17 | Stop reason: SDUPTHER

## 2023-11-17 RX ORDER — OXYCODONE HYDROCHLORIDE 5 MG/1
5 TABLET ORAL EVERY 6 HOURS PRN
Qty: 20 TABLET | Refills: 0 | Status: SHIPPED | OUTPATIENT
Start: 2023-11-17 | End: 2023-12-07 | Stop reason: WASHOUT

## 2023-11-17 RX ORDER — ONDANSETRON 4 MG/1
4 TABLET, ORALLY DISINTEGRATING ORAL EVERY 8 HOURS PRN
Qty: 20 TABLET | Refills: 0 | Status: SHIPPED | OUTPATIENT
Start: 2023-11-17 | End: 2023-11-17 | Stop reason: SDUPTHER

## 2023-11-17 RX ORDER — ONDANSETRON 4 MG/1
4 TABLET, ORALLY DISINTEGRATING ORAL EVERY 8 HOURS PRN
Qty: 20 TABLET | Refills: 0 | Status: SHIPPED | OUTPATIENT
Start: 2023-11-17 | End: 2024-01-22 | Stop reason: ALTCHOICE

## 2023-11-17 RX ADMIN — PANTOPRAZOLE SODIUM 20 MG: 40 INJECTION, POWDER, FOR SOLUTION INTRAVENOUS at 05:27

## 2023-11-17 RX ADMIN — PHENAZOPYRIDINE 100 MG: 100 TABLET ORAL at 08:45

## 2023-11-17 RX ADMIN — GABAPENTIN 300 MG: 300 CAPSULE ORAL at 08:46

## 2023-11-17 RX ADMIN — LEVOTHYROXINE SODIUM 25 MCG: 25 TABLET ORAL at 05:26

## 2023-11-17 RX ADMIN — AMITRIPTYLINE HYDROCHLORIDE 25 MG: 25 TABLET, FILM COATED ORAL at 08:46

## 2023-11-17 RX ADMIN — SERTRALINE HYDROCHLORIDE 100 MG: 50 TABLET ORAL at 08:46

## 2023-11-17 RX ADMIN — ENOXAPARIN SODIUM 40 MG: 40 INJECTION SUBCUTANEOUS at 08:45

## 2023-11-17 RX ADMIN — OXYCODONE HYDROCHLORIDE 10 MG: 5 TABLET ORAL at 05:26

## 2023-11-17 ASSESSMENT — PAIN SCALES - GENERAL: PAINLEVEL_OUTOF10: 8

## 2023-11-17 ASSESSMENT — PAIN - FUNCTIONAL ASSESSMENT: PAIN_FUNCTIONAL_ASSESSMENT: 0-10

## 2023-11-17 NOTE — CARE PLAN
The patient's goals for the shift include      The clinical goals for the shift include pain control      Problem: Fall/Injury  Goal: Not fall by end of shift  Outcome: Adequate for Discharge  Goal: Be free from injury by end of the shift  Outcome: Adequate for Discharge  Goal: Verbalize understanding of personal risk factors for fall in the hospital  Outcome: Adequate for Discharge  Goal: Verbalize understanding of risk factor reduction measures to prevent injury from fall in the home  Outcome: Adequate for Discharge  Goal: Use assistive devices by end of the shift  Outcome: Adequate for Discharge  Goal: Pace activities to prevent fatigue by end of the shift  Outcome: Adequate for Discharge     Problem: Pain  Goal: Takes deep breaths with improved pain control throughout the shift  Outcome: Adequate for Discharge  Goal: Turns in bed with improved pain control throughout the shift  Outcome: Adequate for Discharge  Goal: Walks with improved pain control throughout the shift  Outcome: Adequate for Discharge  Goal: Performs ADL's with improved pain control throughout shift  Outcome: Adequate for Discharge  Goal: Participates in PT with improved pain control throughout the shift  Outcome: Adequate for Discharge  Goal: Free from opioid side effects throughout the shift  Outcome: Adequate for Discharge  Goal: Free from acute confusion related to pain meds throughout the shift  Outcome: Adequate for Discharge     Problem: ADLs  Goal: Patient will perform UB and LB bathing with independent level of assistance.   Outcome: Adequate for Discharge  Goal: Patient with complete upper body dressing with independent level of assistance donning and doffing all UE clothes with no adaptive equipment while edge of bed   Outcome: Adequate for Discharge  Goal: Patient with complete lower body dressing with independent level of assistance donning and doffing all LE clothes  with no adaptive equipment while edge of bed   Outcome: Adequate  for Discharge  Goal: Patient will complete daily grooming tasks brushing teeth with independent level of assistance and PRN adaptive equipment while edge of bed .  Outcome: Adequate for Discharge  Goal: Patient will complete toileting including hygiene clothing management/hygiene with independent level of assistance and raised toilet seat.  Outcome: Adequate for Discharge     Problem: TRANSFERS  Goal: Patient will perform bed mobility independent level of assistance and bed rails in order to improve safety and independence with mobility  Outcome: Adequate for Discharge  Goal: Patient will complete functional transfer to all surfaces with least restrictive device with independent level of assistance.  Outcome: Adequate for Discharge     Problem: BALANCE  Goal: Pt will maintain dynamic standing balance during ADL task with independent level of assistance in order to demonstrate decreased risk of falling and improved postural control.  Outcome: Adequate for Discharge

## 2023-11-17 NOTE — DISCHARGE SUMMARY
"Discharge Diagnosis  Mass of colon    Issues Requiring Follow-Up  Regular Post-op Check    Test Results Pending At Discharge  Pending Labs       Order Current Status    Surgical Pathology Exam In process            Hospital Course  73 yr old Female s/p Laparoscopic sigmoid colectomy with colorectal anastomosis  on 11/15 with Dr. Brothers. Please see operative report for full details. Patient tolerated the procedure well and recovered briefly in PACU before being transitioned to regular nursing floor. Post-op course was uncomplicated. Diet was advanced as tolerated.  IV medication transitioned to oral as diet advanced. On the day of discharge, the pt was tolerating a diet, pain was controlled on PO pain medication, and they were ambulating and voiding spontaneously. They were discharged to home in stable condition with instructions to follow up as outpatient.      Pertinent Physical Exam At Time of Discharge  Physical Exam  Constitutional:       Appearance: Normal appearance.   Cardiovascular:      Rate and Rhythm: Normal rate and regular rhythm.   Pulmonary:      Effort: Pulmonary effort is normal.      Comments: Non labored breathing on RA  Abdominal:      General: There is distension.      Palpations: Abdomen is soft.      Tenderness: There is no abdominal tenderness.      Comments: Lap sites C/D/I, edges well approximated, covered in Dermabond.   Skin:     General: Skin is warm and dry.   Neurological:      General: No focal deficit present.      Mental Status: She is alert and oriented to person, place, and time. Mental status is at baseline.   Psychiatric:         Attention and Perception: Attention normal.         Mood and Affect: Mood normal.         Speech: Speech normal.         Behavior: Behavior normal.         Cognition and Memory: Cognition normal.     /75   Pulse 85   Temp 36.7 °C (98.1 °F)   Resp 16   Ht 1.753 m (5' 9\")   Wt 61.4 kg (135 lb 6.4 oz)   SpO2 97%   BMI 20.00 kg/m²       Home " Medications     Medication List      ASK your doctor about these medications     acetaminophen 650 mg ER tablet; Commonly known as: Tylenol 8 HOUR   amitriptyline 25 mg tablet; Commonly known as: Elavil   atorvastatin 20 mg tablet; Commonly known as: Lipitor   chlorhexidine 0.12 % solution; Commonly known as: Peridex; SWISH AND   SPIT 15ML FOR 30 SECONDS NIGHT PRIOR TO SURGERY AND MORNING OF SURGERY   dicyclomine 20 mg tablet; Commonly known as: Bentyl   gabapentin 100 mg capsule; Commonly known as: Neurontin; Take one   capsule by mouth starting three days before surgery at bedtime.   levothyroxine 25 mcg tablet; Commonly known as: Synthroid, Levoxyl   magnesium hydroxide 400 mg/5 mL suspension; Commonly known as: Milk of   Magnesia   metroNIDAZOLE 250 mg tablet; Commonly known as: FlagyL; Take one tablet   by mouth at 6p, 7p, and 11pm the night before surgery.   multivitamin tablet   neomycin 500 mg tablet; Commonly known as: Mycifradin; Take two tabs by   mouth at 6p, 7pm, and 11p the night before surgery.   sertraline 100 mg tablet; Commonly known as: Zoloft       Outpatient Follow-Up  No future appointments.    Gaetano Haddad PA-C

## 2023-11-20 ENCOUNTER — TELEPHONE (OUTPATIENT)
Dept: SURGERY | Facility: CLINIC | Age: 73
End: 2023-11-20
Payer: MEDICARE

## 2023-11-20 NOTE — TELEPHONE ENCOUNTER
Attempted to reach patient to check on her after hospital discharge. Message left that I was calling to see how she was doing. I invited her to please call the office with an update when able.  Phone number provided.  Fariha Bakc RN

## 2023-11-21 ENCOUNTER — TELEPHONE (OUTPATIENT)
Dept: SURGERY | Facility: CLINIC | Age: 73
End: 2023-11-21
Payer: MEDICARE

## 2023-11-21 DIAGNOSIS — K63.89 COLONIC MASS: ICD-10-CM

## 2023-11-21 RX ORDER — TRAMADOL HYDROCHLORIDE 50 MG/1
50 TABLET ORAL EVERY 6 HOURS PRN
Qty: 10 TABLET | Refills: 0 | Status: SHIPPED | OUTPATIENT
Start: 2023-11-21 | End: 2023-11-26

## 2023-11-27 NOTE — PROGRESS NOTES
Marisabel Richardson is an 73 y.o. female who presented to ER in August 2023 with abdominal pain.  A CT was performed and revealed a  4.3 cm x 3.1 cm soft tissue density in the sigmoid colon.  She was sent for a colonoscopy.   She was found to have a greater than 60 mm polyp was found in the sigmoid colon.  Polyp resection incomplete on colonoscopy.  Pathology revealed villotubular adenoma with low grade (moderate) glandular dysplasia.  It was recommended to proceed with surgery due to concern for cancer.    11/15/2023 Laparoscopic Sigmoid Colectomy  Pathology:   A.  Colon, sigmoid, resection:  -Invasive adenocarcinoma (at least 1.6 cm, measured on slide A6), moderately differentiated, arising in the background of tubulovillous adenoma with high-grade dysplasia, forming a 5.2 cm mass-like lesion.  -Carcinoma invades pericolonic tissue.  -Perineural invasion present; suspicious for extramural venous invasion.   -Resection margins are negative for carcinoma.  -Metastatic adenocarcinoma involving one of twenty-three lymph nodes (1/23); tumor deposits present.  -Pathologic stage: pT3, pN1a.  -Tattoo-like pigment present.  -See comment and synoptic report.     Comment:This adenocarcinoma is composed of two components: conventional colonic adenocarcinoma and mucinous adenocarcinoma. The lymph node and tumor deposits show mucinous adenocarcinoma component.      MISMATCH REPAIR PROTEIN EXPRESSION  Protein:            Result     MLH-1:             Expression Present                                        PMS-2:             Expression Present                                        MSH-2:             Expression Present                                        MSH-6:             Expression Present                                Post op patient c/o abdominal pain.  A CT was ordered to evaluate.  CT showed colitis and she was started on Cipro/Flagyl which improved her symptoms.    11/30/2023 CT ABD/PELVIS WITH CONTRAST  1.  Postsurgical changes of a partial colectomy with normal-appearing sigmoid colon anastomosis. No evidence for local tumor recurrence. Expected postoperative appearance along the anterior abdominal wall. No fluid collection or abscess.  2. Mild wall thickening of the right hemicolon and proximal to mid transverse colon suggestive of mild colitis.  3. Mild hepatic steatosis.  4. Stable left adrenal nodule, statistically likely represent an adenoma.    Met with oncologist Dr. Odom and will start adjuvant chemotherapy with FOLFOX on January 8, 2024.   She reports that her abdomen feels numb down the center of the abdomen.  Not at the incision. Incisions are healed.    Denies fevers.  She is having some cramping and take Bentyl.  She is moving her bowels daily.  Stools are like pudding/mushy.      Review of Systems  Constitutional: Negative for fever, chills, anorexia, weight loss, malaise             ENMT: Negative for nasal discharge, congestion, ear pain, mouth pain, throat pain                 Respiratory: Negative for cough, hemoptysis, wheezing, shortness of breath                Cardiac: Negative for chest pain, dyspnea on exertion, orthopnea, palpitations, syncope, (+)HLD                 Gastrointestinal: Negative for nausea, vomiting, diarrhea, constipation, abdominal pain, (+)COLON CANCER     Genitourinary: Negative for discharge, dysuria, flank pain, frequency, hematuria          Musculoskeletal: Negative for decreased ROM, pain, swelling, weakness          Neurological: Negative for dizziness, confusion, headache, seizures, syncope               Psychiatric: Negative for mood changes, anxiety, hallucinations, sleep changes, suicidal ideas (+)ANXIETY            Skin: Negative for mass, pain, itching, rash, ulcer            Endocrine: Negative for heat intolerance, cold intolerance, excessive sweating, polyuria, excess thirst, (+)HYPOTHYROIDISM        Hematologic/Lymph: Negative for anemia, bruising, easy  bleeding, night sweats, petechiae, history of DVT/PE or cancer               Allergic/Immunologic: Negative for anaphylaxis, itchy/ teary eyes, itching, sneezing, swelling      Physical Exam  Constitutional:       Appearance: Normal appearance.   HENT:      Head: Normocephalic.   Cardiovascular:      Rate and Rhythm: Normal rate and regular rhythm.      Pulses: Normal pulses.   Pulmonary:      Effort: Pulmonary effort is normal.   Abdominal:      General: Bowel sounds are normal.      Palpations: Abdomen is soft.      Comments: Incisions clean dry and intact   Musculoskeletal:         General: Normal range of motion.   Skin:     General: Skin is warm.   Neurological:      General: No focal deficit present.      Mental Status: She is alert.   Psychiatric:         Mood and Affect: Mood normal.         Behavior: Behavior normal.         Problem List Items Addressed This Visit             ICD-10-CM       Hematology and Neoplasia    Malignant neoplasm of colon (CMS/HCC) - Primary C18.9   Healing as expected after surgery.  Ongoing lifting restrictions.  Can resume normal diet.  Has followed up with oncology with plans for adjuvant therapy beginning next month.  Ongoing surveillance with oncology, colonoscopy at the [facility] after surgery

## 2023-11-30 ENCOUNTER — TELEPHONE (OUTPATIENT)
Dept: SURGERY | Facility: CLINIC | Age: 73
End: 2023-11-30
Payer: MEDICARE

## 2023-11-30 ENCOUNTER — LAB (OUTPATIENT)
Dept: LAB | Facility: LAB | Age: 73
End: 2023-11-30
Payer: MEDICARE

## 2023-11-30 ENCOUNTER — ANCILLARY PROCEDURE (OUTPATIENT)
Dept: RADIOLOGY | Facility: CLINIC | Age: 73
End: 2023-11-30
Payer: MEDICARE

## 2023-11-30 DIAGNOSIS — R10.84 GENERALIZED ABDOMINAL PAIN: Primary | ICD-10-CM

## 2023-11-30 DIAGNOSIS — K52.9 COLITIS: Primary | ICD-10-CM

## 2023-11-30 DIAGNOSIS — R10.84 GENERALIZED ABDOMINAL PAIN: ICD-10-CM

## 2023-11-30 LAB
ALBUMIN SERPL BCP-MCNC: 4.2 G/DL (ref 3.4–5)
ALP SERPL-CCNC: 81 U/L (ref 33–136)
ALT SERPL W P-5'-P-CCNC: 11 U/L (ref 7–45)
ANION GAP SERPL CALC-SCNC: 11 MMOL/L (ref 10–20)
AST SERPL W P-5'-P-CCNC: 15 U/L (ref 9–39)
BILIRUB SERPL-MCNC: 0.3 MG/DL (ref 0–1.2)
BUN SERPL-MCNC: 14 MG/DL (ref 6–23)
CALCIUM SERPL-MCNC: 9.7 MG/DL (ref 8.6–10.3)
CHLORIDE SERPL-SCNC: 104 MMOL/L (ref 98–107)
CO2 SERPL-SCNC: 27 MMOL/L (ref 21–32)
CREAT SERPL-MCNC: 0.73 MG/DL (ref 0.5–1.05)
ERYTHROCYTE [DISTWIDTH] IN BLOOD BY AUTOMATED COUNT: 12.6 % (ref 11.5–14.5)
GFR SERPL CREATININE-BSD FRML MDRD: 88 ML/MIN/1.73M*2
GLUCOSE SERPL-MCNC: 114 MG/DL (ref 74–99)
HCT VFR BLD AUTO: 45.7 % (ref 36–46)
HGB BLD-MCNC: 15.1 G/DL (ref 12–16)
MCH RBC QN AUTO: 31.7 PG (ref 26–34)
MCHC RBC AUTO-ENTMCNC: 33 G/DL (ref 32–36)
MCV RBC AUTO: 96 FL (ref 80–100)
NRBC BLD-RTO: 0 /100 WBCS (ref 0–0)
PLATELET # BLD AUTO: 329 X10*3/UL (ref 150–450)
POTASSIUM SERPL-SCNC: 3.9 MMOL/L (ref 3.5–5.3)
PROT SERPL-MCNC: 7.3 G/DL (ref 6.4–8.2)
RBC # BLD AUTO: 4.77 X10*6/UL (ref 4–5.2)
SODIUM SERPL-SCNC: 138 MMOL/L (ref 136–145)
WBC # BLD AUTO: 5.2 X10*3/UL (ref 4.4–11.3)

## 2023-11-30 PROCEDURE — 80053 COMPREHEN METABOLIC PANEL: CPT

## 2023-11-30 PROCEDURE — 85027 COMPLETE CBC AUTOMATED: CPT

## 2023-11-30 PROCEDURE — 36415 COLL VENOUS BLD VENIPUNCTURE: CPT

## 2023-11-30 PROCEDURE — 74177 CT ABD & PELVIS W/CONTRAST: CPT | Mod: FOREIGN READ | Performed by: RADIOLOGY

## 2023-11-30 PROCEDURE — 74177 CT ABD & PELVIS W/CONTRAST: CPT | Mod: FR

## 2023-11-30 PROCEDURE — 2550000001 HC RX 255 CONTRASTS: Performed by: SURGERY

## 2023-11-30 RX ORDER — METRONIDAZOLE 500 MG/1
500 TABLET ORAL 3 TIMES DAILY
Qty: 21 TABLET | Refills: 0 | Status: SHIPPED | OUTPATIENT
Start: 2023-11-30 | End: 2023-12-07

## 2023-11-30 RX ORDER — CIPROFLOXACIN 500 MG/1
500 TABLET ORAL 2 TIMES DAILY
Qty: 14 TABLET | Refills: 0 | Status: SHIPPED | OUTPATIENT
Start: 2023-11-30 | End: 2023-12-07

## 2023-11-30 RX ADMIN — IOHEXOL 75 ML: 350 INJECTION, SOLUTION INTRAVENOUS at 13:44

## 2023-11-30 NOTE — TELEPHONE ENCOUNTER
"Marisabel is calling c/o bad abdominal pain.  She is s/p 11/15/2023 LX Sigmoid colectomy.  She is c/o pain in between the navel and her pubic bone.  Pain is intermittent if she does NOT eat.  Pain is constant and worse if eating. She describes the pain as \"the worse menstrual cramps of my life\".  She is moving her bowels once per day. Stool is like pudding, no blood.  She gets hungry and eats.  She denies bloating, n/v.  She is avoiding eating due to the pain.  Weight is down ten pounds. Denies fever/chills. Denies nightsweats.   Has been takig 40 mg Bentyl three times per day and Gas X without relief.  Incision is clean and dry.  Off of narcotics.  Taking prn Tylenol.  I advised that I don't expect her to be in this much pain.  Discussed with Dr. Brothers and will send her for labs and CT abd/pelvis.  Fariha Back RN    "

## 2023-12-05 DIAGNOSIS — C18.9 MALIGNANT NEOPLASM OF COLON, UNSPECIFIED PART OF COLON (MULTI): ICD-10-CM

## 2023-12-05 LAB
LAB AP ASR DISCLAIMER: NORMAL
LABORATORY COMMENT REPORT: NORMAL
PATH REPORT.FINAL DX SPEC: NORMAL
PATH REPORT.GROSS SPEC: NORMAL
PATH REPORT.RELEVANT HX SPEC: NORMAL
PATH REPORT.TOTAL CANCER: NORMAL
PATHOLOGY SYNOPTIC REPORT: NORMAL

## 2023-12-07 ENCOUNTER — OFFICE VISIT (OUTPATIENT)
Dept: HEMATOLOGY/ONCOLOGY | Facility: HOSPITAL | Age: 73
End: 2023-12-07
Payer: MEDICARE

## 2023-12-07 VITALS
HEIGHT: 67 IN | HEART RATE: 114 BPM | SYSTOLIC BLOOD PRESSURE: 113 MMHG | DIASTOLIC BLOOD PRESSURE: 73 MMHG | WEIGHT: 136.91 LBS | BODY MASS INDEX: 21.49 KG/M2 | OXYGEN SATURATION: 100 % | RESPIRATION RATE: 18 BRPM | TEMPERATURE: 98.6 F

## 2023-12-07 DIAGNOSIS — Z51.89 ENCOUNTER FOR THERAPEUTIC PROCEDURE: ICD-10-CM

## 2023-12-07 DIAGNOSIS — C18.9 MALIGNANT NEOPLASM OF COLON, UNSPECIFIED PART OF COLON (MULTI): Primary | ICD-10-CM

## 2023-12-07 DIAGNOSIS — C18.7 MALIGNANT NEOPLASM OF SIGMOID COLON (MULTI): ICD-10-CM

## 2023-12-07 DIAGNOSIS — E04.9 GOITER: ICD-10-CM

## 2023-12-07 PROCEDURE — 1126F AMNT PAIN NOTED NONE PRSNT: CPT | Performed by: INTERNAL MEDICINE

## 2023-12-07 PROCEDURE — 1159F MED LIST DOCD IN RCRD: CPT | Performed by: INTERNAL MEDICINE

## 2023-12-07 PROCEDURE — 99215 OFFICE O/P EST HI 40 MIN: CPT | Performed by: INTERNAL MEDICINE

## 2023-12-07 PROCEDURE — 99205 OFFICE O/P NEW HI 60 MIN: CPT | Performed by: INTERNAL MEDICINE

## 2023-12-07 PROCEDURE — 1111F DSCHRG MED/CURRENT MED MERGE: CPT | Performed by: INTERNAL MEDICINE

## 2023-12-07 PROCEDURE — 1160F RVW MEDS BY RX/DR IN RCRD: CPT | Performed by: INTERNAL MEDICINE

## 2023-12-07 RX ORDER — HEPARIN SODIUM,PORCINE/PF 10 UNIT/ML
50 SYRINGE (ML) INTRAVENOUS AS NEEDED
Status: CANCELLED | OUTPATIENT
Start: 2023-12-07

## 2023-12-07 RX ORDER — ONDANSETRON HYDROCHLORIDE 8 MG/1
8 TABLET, FILM COATED ORAL EVERY 8 HOURS PRN
Qty: 30 TABLET | Refills: 5 | Status: SHIPPED | OUTPATIENT
Start: 2023-12-07 | End: 2024-01-22 | Stop reason: SDUPTHER

## 2023-12-07 RX ORDER — PROCHLORPERAZINE MALEATE 10 MG
10 TABLET ORAL EVERY 6 HOURS PRN
Qty: 30 TABLET | Refills: 5 | Status: SHIPPED | OUTPATIENT
Start: 2023-12-07

## 2023-12-07 RX ORDER — LOPERAMIDE HYDROCHLORIDE 2 MG/1
CAPSULE ORAL
Qty: 100 CAPSULE | Refills: 2 | Status: SHIPPED | OUTPATIENT
Start: 2023-12-07

## 2023-12-07 RX ORDER — HEPARIN 100 UNIT/ML
500 SYRINGE INTRAVENOUS AS NEEDED
Status: CANCELLED | OUTPATIENT
Start: 2023-12-07

## 2023-12-07 ASSESSMENT — PAIN SCALES - GENERAL: PAINLEVEL: 0-NO PAIN

## 2023-12-07 NOTE — PROGRESS NOTES
Patient ID: Marisabel Richardson is a 73 y.o. female.    Diagnoses:   1. 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  Proficient MMR.  2.  A large goiter with a history of hypothyroidism-currently on replacement dose of thyroxine.    Genomic profile: pMMR.    Assessment and Plan:  This is a pleasant woman who presented with a diagnosis of localized sigmoid colon adenocarcinoma.  She had a colonoscopy in October 2023 when she had abdominal pain.  Colonoscopy revealed multiple colonic polyps and a sigmoid colon mass.  Biopsy confirmed adenocarcinoma, proficient MMR.  Subsequently on November 15, 2023 she underwent colectomy by Dr. Lev Stanton.  She recovered well from the surgery.    I discussed the adjuvant treatment plan with her.  I recommended adjuvant chemotherapy based on the current NCCN guidelines since she has involvement of the lymph node.  I recommended FOLFOX for 6 cycles.  Discussed the chemotherapy regimen in detail.  I have discussed the possible side effects which include but not limited to nausea, vomiting, diarrhea, peripheral neuropathic symptoms in the form of tingling or numbness in the hands or feet or cold sensitivity.  She can also experience fatigue, risk of infection.  There is a small risk of coronary vasospasm from 5-FU or liver damage from oxaliplatin.  After discussion she wanted to proceed with adjuvant chemotherapy which I ordered for her.    I ordered a port placement as well.    Follow-up:  to start adjuvant chemotherapy with FOLFOX on January 8, 2024.    I have placed all orders as outlined above. I advised the patient to schedule the tests and follow-up appointment as discussed by contacting the  on the way out or calling by phone.    Providers:  Surgeon: Lev Brothers.  MedOnc:  RadOnc:    Chief complaint:  Resected sigmoid colon adenocarcinoma.    HPI:  ONCOLOGIC HISTORY-  October 2, 2023: Had a colonoscopy in an outside  facility when she presented with abdominal pain.  Colonoscopy showed a sigmoid mass.  Biopsy confirmed adenocarcinoma.  She also also had multiple polyps in her colon some of which have been removed.    2023: She had a CT scan of chest abdomen pelvis which did not show any distant metastatic disease.  CT scan showed a large goiter.  She tells me that she has an endocrinologist who she follows up with.    November 15, 2023: She underwent colectomy.  Pathology confirmed pT3 pN1 adenocarcinoma without any involvement of the margins.    Interval history:  Today she has almost no symptoms except some soreness at the surgical sites.  Her appetite is normal.  She has no nausea or vomiting or any other gastrointestinal symptoms.    Past Medical History:   Past Medical History:  No date: Anxiety  No date: Colon polyp  No date: Depression  No date: HLD (hyperlipidemia)  No date: Hypothyroidism  No date: Mass of colon      Comment:  sigmoid colon   Surgical History:    Past Surgical History:   Procedure Laterality Date    CATARACT EXTRACTION Bilateral     COLONOSCOPY      HYSTERECTOMY        Family History:    Family History   Problem Relation Name Age of Onset    No Known Problems Mother      Cancer Father      Diabetes Father      Diabetes Sister      Diabetes Brother       Family Oncology History:    Cancer-related family history includes Cancer in her father.  Social History:    Social History     Tobacco Use    Smoking status: Former     Packs/day: 1     Types: Cigarettes     Quit date: 2023     Years since quittin.0    Smokeless tobacco: Never   Vaping Use    Vaping Use: Never used   Substance Use Topics    Alcohol use: Never    Drug use: Never        Allergies  No Known Allergies     Medications  Current Outpatient Medications   Medication Instructions    acetaminophen (TYLENOL 8 HOUR) 650 mg, oral, Every 8 hours PRN, Do not crush, chew, or split.    amitriptyline (Elavil) 25 mg tablet 2 times daily  "   atorvastatin (LIPITOR) 20 mg, oral, Daily    ciprofloxacin (CIPRO) 500 mg, oral, 2 times daily    dicyclomine (Bentyl) 20 mg tablet oral, 3 times daily PRN    gabapentin (Neurontin) 100 mg capsule Take one capsule by mouth starting three days before surgery at bedtime.    levothyroxine (SYNTHROID, LEVOXYL) 25 mcg, oral    loperamide (Imodium) 2 mg capsule Take 2 capsules (4 mg) by mouth with the first episode of diarrhea and 1 capsule (2 mg) by mouth with any additional episodes. Maximum 8 capsules (16 mg) per day.    magnesium hydroxide (Milk of Magnesia) 400 mg/5 mL suspension oral, Daily PRN    metroNIDAZOLE (FLAGYL) 500 mg, oral, 3 times daily    multivitamin tablet 1 tablet, oral, Daily    neomycin (Mycifradin) 500 mg tablet Take two tabs by mouth at 6p, 7pm, and 11p the night before surgery.    ondansetron (ZOFRAN) 8 mg, oral, Every 8 hours PRN    ondansetron ODT (ZOFRAN-ODT) 4 mg, oral, Every 8 hours PRN    oxyCODONE (ROXICODONE) 5 mg, oral, Every 6 hours PRN    prochlorperazine (COMPAZINE) 10 mg, oral, Every 6 hours PRN    sertraline (ZOLOFT) 100 mg, oral, Daily          Objective   VS: /73 (BP Location: Left arm, Patient Position: Sitting, BP Cuff Size: Adult)   Pulse (!) 114   Temp 37 °C (98.6 °F) (Temporal)   Resp 18   Ht (S) 1.707 m (5' 7.21\")   Wt 62.1 kg (136 lb 14.5 oz)   SpO2 100%   BMI 21.31 kg/m²   Weight:   Vitals:    12/07/23 1411   Weight: 62.1 kg (136 lb 14.5 oz)        PHYSICAL EXAMINATION  ECOG performance status-0.  VS:  /73 (BP Location: Left arm, Patient Position: Sitting, BP Cuff Size: Adult)   Pulse (!) 114   Temp 37 °C (98.6 °F) (Temporal)   Resp 18   Ht (S) 1.707 m (5' 7.21\")   Wt 62.1 kg (136 lb 14.5 oz)   SpO2 100%   BMI 21.31 kg/m²   Weight:   Vitals:    12/07/23 1411   Weight: 62.1 kg (136 lb 14.5 oz)       BSA: 1.72 meters squared   Pain Scale: 0.    Constitutional: Awake/alert/oriented x3, cooperative and answers questions appropriately.     Eyes: No " pallor, conjunctival injection, clear sclera.    Mouth: No ulceration. No thrush.     Head/Neck: She has a large goiter.     Respiratory/Thorax: Normal breath sounds with bilaterally symmetrical chest expansion. No dullness.      Cardiovascular: No audible murmurs, normal heart sounds. No pericardial rub.     Gastrointestinal: Nondistended, soft, non-tender, no rebound tenderness or guarding, no masses palpable, no organomegaly, +BS, no bruits. No ascites.     Musculoskeletal: No joint swelling, redness.      Extremities: normal extremities, no cyanosis edema, contusions or wounds, no clubbing.     Lymphatic: No significant lymphadenopathy.     Skin: Warm and dry, no lesions, no rashes.     Labs                         Image           Dodie Odom MD.

## 2023-12-07 NOTE — PROGRESS NOTES
Pt here with . Pt is a retired oncology RN. Denies nausea, vomiting and diarrhea. Pt is concerned about traveling to office from home. Pt can have treatment and see MD at [facility] at [place]. Per MD patient to have port placed and start treatment on 01/08/2023. Consent signed with MD, pt educated on [facility] and chemo class complete. Medications, pharmacy preference and allergies were reviewed with patient and updated in the medical record. Patient verbalized understanding and agreement regarding discussed information via verbal feedback. Pt escorted to scheduling.

## 2023-12-08 DIAGNOSIS — K52.9 COLITIS: Primary | ICD-10-CM

## 2023-12-08 RX ORDER — CIPROFLOXACIN 500 MG/1
500 TABLET ORAL 2 TIMES DAILY
Qty: 14 TABLET | Refills: 0 | Status: SHIPPED | OUTPATIENT
Start: 2023-12-08 | End: 2023-12-12

## 2023-12-08 RX ORDER — METRONIDAZOLE 500 MG/1
500 TABLET ORAL 3 TIMES DAILY
Qty: 21 TABLET | Refills: 0 | Status: SHIPPED | OUTPATIENT
Start: 2023-12-08 | End: 2023-12-15

## 2023-12-08 NOTE — PROGRESS NOTES
She just finished the cipro/flagyl for her colitis and today she has the same umbilical discomfort again.  It is more of a cramping pain.  She will have nausea with no vomiting.  No c/o any fever;chills.  No issues with her BM's and will have 1 soft Bm jarocho;ry day with no bleeding.  I will have her restart the cipro/flagyl for a week.  She will follow up on Tuesday with Dr. Stanton.

## 2023-12-11 ENCOUNTER — DOCUMENTATION (OUTPATIENT)
Dept: SURGERY | Facility: CLINIC | Age: 73
End: 2023-12-11
Payer: MEDICARE

## 2023-12-11 NOTE — DOCUMENTATION CLARIFICATION NOTE
PATIENT:               ALBERTO LEYVA  ACCT #:                  [id number]  MRN:                       [id number]  :                       1950  ADMIT DATE:       11/15/2023 6:29 AM  DISCH DATE:        2023 11:36 AM  RESPONDING PROVIDER #:        [id number]          PROVIDER RESPONSE TEXT:    I concur with the pathology report results from 11/15/23 11:15 and they are clinically significant    CDI QUERY TEXT:    [facility]_Path Results Simple    Instruction:    Based on your assessment of the patient and the clinical information, please provide the requested documentation by clicking on the appropriate radio button and enter any additional information if prompted.    Question: Please document whether you concur or do not concur with the pathology report results from 11/15/23 11:15    When answering this query, please exercise your independent professional judgment. The fact that a question is being asked, does not imply that any particular answer is desired or expected.    The patient's clinical indicators include:  Clinical Information:  74 y/[place] [facility] [place]    Clinical Indicators and Pathology Findings  Surgical Pathology results 11/15/23 11:15: ?FINAL DIAGNOSIS A.  Colon, sigmoid, resection: -Invasive adenocarcinoma (at least 1.6 cm, measured on slide A6), moderately differentiated, arising in the background of tubulovillous adenoma with high-grade dysplasia, forming a 5.2 cm mass-like lesion. -Carcinoma invades pericolonic tissue. -Metastatic adenocarcinoma involving one of twenty-three lymph nodes (); tumor deposits present. -Pathologic stage: pT3, pN1a.?    HP (View Only) per MERY Reyna-CNP 23: ?She presents with colonic mass concerning for underlying malignancy. She will proceed with laparoscopic sigmoid colectomy.?    Treatment: OR 11/15 - LAP Sigmoid Colectomy, Colorectal Anastomosis  Risk Factors: PMH: Hypothyroidism, HLD, Dementia  Options provided:  -- I  concur with the pathology report results from 11/15/23 11:15 and they are clinically significant  -- I do not concur with the pathology report results from 11/15/23 11:15  -- Other - I will add my own diagnosis  -- Refer to Clinical Documentation Reviewer    Query created by: Belle Contreras on 12/8/2023 9:25 AM      Electronically signed by:  TERELL REID MD 12/11/2023 12:10 PM

## 2023-12-11 NOTE — PROGRESS NOTES
She has been taking Cipro/Flagyl for her colits and she just finished the medication and the abdominal pain started again in the [facility].  The pain is the same pain that she has when she has the colitis.  She had chills but no fever.  She had a little nausea with no vomiting.  Her bowels are soft-liquid.  She will restart Cipro/Flagyl for another week and follow up with Dr. Stanton.

## 2023-12-12 ENCOUNTER — OFFICE VISIT (OUTPATIENT)
Dept: SURGERY | Facility: CLINIC | Age: 73
End: 2023-12-12
Payer: MEDICARE

## 2023-12-12 VITALS
BODY MASS INDEX: 21.02 KG/M2 | SYSTOLIC BLOOD PRESSURE: 128 MMHG | WEIGHT: 135 LBS | HEART RATE: 95 BPM | DIASTOLIC BLOOD PRESSURE: 69 MMHG

## 2023-12-12 DIAGNOSIS — C18.7 MALIGNANT NEOPLASM OF SIGMOID COLON (MULTI): Primary | ICD-10-CM

## 2023-12-12 PROCEDURE — 1160F RVW MEDS BY RX/DR IN RCRD: CPT | Performed by: SURGERY

## 2023-12-12 PROCEDURE — 99024 POSTOP FOLLOW-UP VISIT: CPT | Performed by: SURGERY

## 2023-12-12 PROCEDURE — 1159F MED LIST DOCD IN RCRD: CPT | Performed by: SURGERY

## 2023-12-12 PROCEDURE — 1126F AMNT PAIN NOTED NONE PRSNT: CPT | Performed by: SURGERY

## 2023-12-12 PROCEDURE — 1111F DSCHRG MED/CURRENT MED MERGE: CPT | Performed by: SURGERY

## 2023-12-12 ASSESSMENT — PAIN SCALES - GENERAL: PAINLEVEL: 0-NO PAIN

## 2023-12-21 ENCOUNTER — TELEPHONE (OUTPATIENT)
Dept: HEMATOLOGY/ONCOLOGY | Facility: HOSPITAL | Age: 73
End: 2023-12-21

## 2023-12-27 ENCOUNTER — TELEPHONE (OUTPATIENT)
Dept: HEMATOLOGY/ONCOLOGY | Facility: CLINIC | Age: 73
End: 2023-12-27
Payer: MEDICARE

## 2024-01-01 ENCOUNTER — HOSPITAL ENCOUNTER (EMERGENCY)
Facility: HOSPITAL | Age: 74
Discharge: HOME | End: 2024-01-01
Attending: EMERGENCY MEDICINE
Payer: MEDICARE

## 2024-01-01 VITALS
BODY MASS INDEX: 19.4 KG/M2 | TEMPERATURE: 98.1 F | OXYGEN SATURATION: 97 % | RESPIRATION RATE: 18 BRPM | HEIGHT: 68 IN | WEIGHT: 128 LBS | DIASTOLIC BLOOD PRESSURE: 84 MMHG | HEART RATE: 92 BPM | SYSTOLIC BLOOD PRESSURE: 124 MMHG

## 2024-01-01 DIAGNOSIS — R10.30 LOWER ABDOMINAL PAIN: Primary | ICD-10-CM

## 2024-01-01 LAB
ANION GAP SERPL CALC-SCNC: 11 MMOL/L (ref 10–20)
BASOPHILS # BLD AUTO: 0.03 X10*3/UL (ref 0–0.1)
BASOPHILS NFR BLD AUTO: 0.3 %
BUN SERPL-MCNC: 18 MG/DL (ref 6–23)
CALCIUM SERPL-MCNC: 8.7 MG/DL (ref 8.6–10.3)
CHLORIDE SERPL-SCNC: 106 MMOL/L (ref 98–107)
CO2 SERPL-SCNC: 25 MMOL/L (ref 21–32)
CREAT SERPL-MCNC: 0.6 MG/DL (ref 0.5–1.05)
EOSINOPHIL # BLD AUTO: 0.09 X10*3/UL (ref 0–0.4)
EOSINOPHIL NFR BLD AUTO: 0.9 %
ERYTHROCYTE [DISTWIDTH] IN BLOOD BY AUTOMATED COUNT: 12.5 % (ref 11.5–14.5)
GFR SERPL CREATININE-BSD FRML MDRD: >90 ML/MIN/1.73M*2
GIANT PLATELETS BLD QL SMEAR: NORMAL
GLUCOSE SERPL-MCNC: 99 MG/DL (ref 74–99)
HCT VFR BLD AUTO: 39.3 % (ref 36–46)
HGB BLD-MCNC: 13.3 G/DL (ref 12–16)
IMM GRANULOCYTES # BLD AUTO: 0.03 X10*3/UL (ref 0–0.5)
IMM GRANULOCYTES NFR BLD AUTO: 0.3 % (ref 0–0.9)
LACTATE SERPL-SCNC: 1.1 MMOL/L (ref 0.4–2)
LYMPHOCYTES # BLD AUTO: 1.17 X10*3/UL (ref 0.8–3)
LYMPHOCYTES NFR BLD AUTO: 11.2 %
MCH RBC QN AUTO: 31.5 PG (ref 26–34)
MCHC RBC AUTO-ENTMCNC: 33.8 G/DL (ref 32–36)
MCV RBC AUTO: 93 FL (ref 80–100)
MONOCYTES # BLD AUTO: 1.16 X10*3/UL (ref 0.05–0.8)
MONOCYTES NFR BLD AUTO: 11.1 %
NEUTROPHILS # BLD AUTO: 7.93 X10*3/UL (ref 1.6–5.5)
NEUTROPHILS NFR BLD AUTO: 76.2 %
NRBC BLD-RTO: 0 /100 WBCS (ref 0–0)
PLATELET # BLD AUTO: 218 X10*3/UL (ref 150–450)
POTASSIUM SERPL-SCNC: 3.4 MMOL/L (ref 3.5–5.3)
RBC # BLD AUTO: 4.22 X10*6/UL (ref 4–5.2)
RBC MORPH BLD: NORMAL
SODIUM SERPL-SCNC: 139 MMOL/L (ref 136–145)
WBC # BLD AUTO: 10.4 X10*3/UL (ref 4.4–11.3)

## 2024-01-01 PROCEDURE — 96374 THER/PROPH/DIAG INJ IV PUSH: CPT

## 2024-01-01 PROCEDURE — 96361 HYDRATE IV INFUSION ADD-ON: CPT

## 2024-01-01 PROCEDURE — 2500000004 HC RX 250 GENERAL PHARMACY W/ HCPCS (ALT 636 FOR OP/ED): Performed by: NURSE PRACTITIONER

## 2024-01-01 PROCEDURE — 83605 ASSAY OF LACTIC ACID: CPT | Performed by: NURSE PRACTITIONER

## 2024-01-01 PROCEDURE — 80048 BASIC METABOLIC PNL TOTAL CA: CPT | Performed by: NURSE PRACTITIONER

## 2024-01-01 PROCEDURE — 96372 THER/PROPH/DIAG INJ SC/IM: CPT

## 2024-01-01 PROCEDURE — 99284 EMERGENCY DEPT VISIT MOD MDM: CPT | Performed by: EMERGENCY MEDICINE

## 2024-01-01 PROCEDURE — 36415 COLL VENOUS BLD VENIPUNCTURE: CPT | Performed by: NURSE PRACTITIONER

## 2024-01-01 PROCEDURE — 85025 COMPLETE CBC W/AUTO DIFF WBC: CPT | Performed by: NURSE PRACTITIONER

## 2024-01-01 RX ORDER — ONDANSETRON HYDROCHLORIDE 2 MG/ML
4 INJECTION, SOLUTION INTRAVENOUS ONCE
Status: COMPLETED | OUTPATIENT
Start: 2024-01-01 | End: 2024-01-01

## 2024-01-01 RX ORDER — DICYCLOMINE HYDROCHLORIDE 10 MG/ML
20 INJECTION INTRAMUSCULAR ONCE
Status: COMPLETED | OUTPATIENT
Start: 2024-01-01 | End: 2024-01-01

## 2024-01-01 RX ADMIN — DICYCLOMINE HYDROCHLORIDE 20 MG: 10 INJECTION, SOLUTION INTRAMUSCULAR at 08:42

## 2024-01-01 RX ADMIN — SODIUM CHLORIDE 1000 ML: 9 INJECTION, SOLUTION INTRAVENOUS at 08:43

## 2024-01-01 RX ADMIN — ONDANSETRON 4 MG: 2 INJECTION INTRAMUSCULAR; INTRAVENOUS at 08:42

## 2024-01-01 ASSESSMENT — LIFESTYLE VARIABLES
REASON UNABLE TO ASSESS: NO
HAVE YOU EVER FELT YOU SHOULD CUT DOWN ON YOUR DRINKING: NO
EVER FELT BAD OR GUILTY ABOUT YOUR DRINKING: NO
HAVE PEOPLE ANNOYED YOU BY CRITICIZING YOUR DRINKING: NO
EVER HAD A DRINK FIRST THING IN THE MORNING TO STEADY YOUR NERVES TO GET RID OF A HANGOVER: NO

## 2024-01-01 ASSESSMENT — PAIN - FUNCTIONAL ASSESSMENT: PAIN_FUNCTIONAL_ASSESSMENT: 0-10

## 2024-01-01 ASSESSMENT — COLUMBIA-SUICIDE SEVERITY RATING SCALE - C-SSRS
1. IN THE PAST MONTH, HAVE YOU WISHED YOU WERE DEAD OR WISHED YOU COULD GO TO SLEEP AND NOT WAKE UP?: NO
2. HAVE YOU ACTUALLY HAD ANY THOUGHTS OF KILLING YOURSELF?: NO
6. HAVE YOU EVER DONE ANYTHING, STARTED TO DO ANYTHING, OR PREPARED TO DO ANYTHING TO END YOUR LIFE?: NO

## 2024-01-01 ASSESSMENT — PAIN SCALES - GENERAL: PAINLEVEL_OUTOF10: 7

## 2024-01-01 NOTE — ED PROVIDER NOTES
Chief Complaint   Patient presents with   • Abdominal Pain     Umbilical burning, nausea       HPI       73 year old female presents to the Emergency Department today complaining of a 2-3 day history of lower abdominal pain that she describes as cramping in nature, constant, non-radiating, and varies in intensity. Reports to having nausea with multiple episodes of vomiting and diarrhea. Denies any associated fever, chills, headache, neck pain, chest pain, shortness of breath, constipation, hematemesis, hematochezia, melena, or urinary symptoms. Notes that she was evaluated at [place] [facility] yesterday and had negative blood work and CT scan. Reports that she had a polyp removed 6 weeks ago.         History provided by:  Patient             Patient History   Past Medical History:   Diagnosis Date   • Anxiety    • Colon polyp    • Depression    • HLD (hyperlipidemia)    • Hypothyroidism    • Mass of colon     sigmoid colon     Past Surgical History:   Procedure Laterality Date   • CATARACT EXTRACTION Bilateral    • COLONOSCOPY     • HYSTERECTOMY       Family History   Problem Relation Name Age of Onset   • No Known Problems Mother     • Cancer Father     • Diabetes Father     • Diabetes Sister     • Diabetes Brother       Social History     Tobacco Use   • Smoking status: Former     Packs/day: 1     Types: Cigarettes     Quit date: 2023     Years since quittin.1   • Smokeless tobacco: Never   Vaping Use   • Vaping Use: Never used   Substance Use Topics   • Alcohol use: Never   • Drug use: Never           Physical Exam  Constitutional:       Appearance: Normal appearance.   HENT:      Head: Normocephalic.      Right Ear: Tympanic membrane, ear canal and external ear normal.      Left Ear: Tympanic membrane, ear canal and external ear normal.      Nose: Nose normal.      Mouth/Throat:      Mouth: Mucous membranes are moist.      Pharynx: Oropharynx is clear. No oropharyngeal exudate or posterior oropharyngeal erythema.    Eyes:      Conjunctiva/sclera: Conjunctivae normal.      Pupils: Pupils are equal, round, and reactive to light.   Cardiovascular:      Rate and Rhythm: Normal rate and regular rhythm.      Pulses:           Radial pulses are 3+ on the right side and 3+ on the left side.        Dorsalis pedis pulses are 3+ on the right side and 3+ on the left side.      Heart sounds: Normal heart sounds. No murmur heard.     No friction rub. No gallop.   Pulmonary:      Effort: Pulmonary effort is normal. No respiratory distress.      Breath sounds: Normal breath sounds. No wheezing, rhonchi or rales.   Abdominal:      General: Abdomen is flat. Bowel sounds are normal.      Palpations: Abdomen is soft.      Tenderness: There is no abdominal tenderness. There is no right CVA tenderness, left CVA tenderness, guarding or rebound. Negative signs include Crane's sign and McBurney's sign.   Musculoskeletal:         General: No swelling or deformity.      Cervical back: Full passive range of motion without pain.      Right lower leg: No edema.      Left lower leg: No edema.   Lymphadenopathy:      Cervical: No cervical adenopathy.   Skin:     Capillary Refill: Capillary refill takes less than 2 seconds.      Coloration: Skin is not jaundiced.      Findings: No rash.   Neurological:      General: No focal deficit present.      Mental Status: She is alert and oriented to person, place, and time. Mental status is at baseline.      Gait: Gait is intact.   Psychiatric:         Mood and Affect: Mood normal.         Behavior: Behavior is cooperative.         Labs Reviewed   CBC WITH AUTO DIFFERENTIAL - Abnormal       Result Value    WBC 10.4      nRBC 0.0      RBC 4.22      Hemoglobin 13.3      Hematocrit 39.3      MCV 93      MCH 31.5      MCHC 33.8      RDW 12.5      Platelets 218      Neutrophils % 76.2      Immature Granulocytes %, Automated 0.3      Lymphocytes % 11.2      Monocytes % 11.1      Eosinophils % 0.9      Basophils % 0.3       Neutrophils Absolute 7.93 (*)     Immature Granulocytes Absolute, Automated 0.03      Lymphocytes Absolute 1.17      Monocytes Absolute 1.16 (*)     Eosinophils Absolute 0.09      Basophils Absolute 0.03     BASIC METABOLIC PANEL - Abnormal    Glucose 99      Sodium 139      Potassium 3.4 (*)     Chloride 106      Bicarbonate 25      Anion Gap 11      Urea Nitrogen 18      Creatinine 0.60      eGFR >90      Calcium 8.7     LACTATE - Normal    Lactate 1.1      Narrative:     Venipuncture immediately after or during the administration of Metamizole may lead to falsely low results. Testing should be performed immediately  prior to Metamizole dosing.   MORPHOLOGY    RBC Morphology See Below      Giant Platelets Few         No orders to display            ED Course & MDM   Diagnoses as of 01/01/24 1001   Lower abdominal pain           Medical Decision Making  Patient was seen and evaluated by Dr. Allred. Saline lock was established with labs drawn and results as above. Given 1 Liter of normal saline wide open over 1 hour. Given Zofran and Bentyl as well. After receiving the medications and IV fluids, reported feeling improved. Blood counts, electrolytes, kidney function, and lactate were unremarkable. Repeat abdominal evaluation reveals an abdomen soft, nondistended, and nontender to palpation. There was no rebound, rigidity, or guarding noted. There were no peritoneal signs noted. Continued to have multiple benign serial abdominal exams. At this time, we find no underlying evidence of acute pancreatitis, cholecystitis, cholangitis, diverticulitis, or appendicitis. In reviewing her records from [place] that she provided, she had a negative CT scan of her abdomen and pelvis. Therefore, we do not feel that a repeat scan is clinically indicated. To continue her Bentyl as previously directed. Follow up with their doctor in 3 days. Return if worse in any way. Discharged in stable condition with computer  instructions.    Diagnostic Impression:     1. Acute lower abdominal pain    2. IV meds and fluids in ED     3. IM injection           Your medication list        ASK your doctor about these medications        Instructions Last Dose Given Next Dose Due   amitriptyline 25 mg tablet  Commonly known as: Elavil           atorvastatin 20 mg tablet  Commonly known as: Lipitor           dicyclomine 20 mg tablet  Commonly known as: Bentyl           levothyroxine 25 mcg tablet  Commonly known as: Synthroid, Levoxyl           loperamide 2 mg capsule  Commonly known as: Imodium      Take 2 capsules (4 mg) by mouth with the first episode of diarrhea and 1 capsule (2 mg) by mouth with any additional episodes. Maximum 8 capsules (16 mg) per day.       magnesium hydroxide 400 mg/5 mL suspension  Commonly known as: Milk of Magnesia           multivitamin tablet           ondansetron 8 mg tablet  Commonly known as: Zofran      Take 1 tablet (8 mg) by mouth every 8 hours if needed for nausea or vomiting.       ondansetron ODT 4 mg disintegrating tablet  Commonly known as: Zofran-ODT      Take 1 tablet (4 mg) by mouth every 8 hours if needed for nausea or vomiting.       prochlorperazine 10 mg tablet  Commonly known as: Compazine      Take 1 tablet (10 mg) by mouth every 6 hours if needed for nausea or vomiting.       sertraline 100 mg tablet  Commonly known as: Zoloft                      Procedure  Procedures     MERY Pagan-CNP  01/01/24 1001

## 2024-01-05 ENCOUNTER — APPOINTMENT (OUTPATIENT)
Dept: CARDIOLOGY | Facility: HOSPITAL | Age: 74
End: 2024-01-05
Payer: MEDICARE

## 2024-01-08 ENCOUNTER — APPOINTMENT (OUTPATIENT)
Dept: HEMATOLOGY/ONCOLOGY | Facility: CLINIC | Age: 74
End: 2024-01-08
Payer: MEDICARE

## 2024-01-10 ENCOUNTER — APPOINTMENT (OUTPATIENT)
Dept: HEMATOLOGY/ONCOLOGY | Facility: CLINIC | Age: 74
End: 2024-01-10
Payer: MEDICARE

## 2024-01-14 DIAGNOSIS — C18.7 MALIGNANT NEOPLASM OF SIGMOID COLON (MULTI): Primary | ICD-10-CM

## 2024-01-18 ENCOUNTER — TELEPHONE (OUTPATIENT)
Dept: ADMISSION | Facility: HOSPITAL | Age: 74
End: 2024-01-18

## 2024-01-18 ENCOUNTER — HOSPITAL ENCOUNTER (OUTPATIENT)
Dept: CARDIOLOGY | Facility: HOSPITAL | Age: 74
Discharge: HOME | End: 2024-01-18
Payer: MEDICARE

## 2024-01-18 VITALS
BODY MASS INDEX: 18.81 KG/M2 | HEART RATE: 96 BPM | TEMPERATURE: 98.2 F | OXYGEN SATURATION: 97 % | SYSTOLIC BLOOD PRESSURE: 144 MMHG | RESPIRATION RATE: 16 BRPM | WEIGHT: 127 LBS | DIASTOLIC BLOOD PRESSURE: 72 MMHG | HEIGHT: 69 IN

## 2024-01-18 DIAGNOSIS — C18.9 MALIGNANT NEOPLASM OF COLON, UNSPECIFIED PART OF COLON (MULTI): ICD-10-CM

## 2024-01-18 PROCEDURE — 2720000007 HC OR 272 NO HCPCS: Performed by: RADIOLOGY

## 2024-01-18 PROCEDURE — 99153 MOD SED SAME PHYS/QHP EA: CPT | Performed by: RADIOLOGY

## 2024-01-18 PROCEDURE — 36010 PLACE CATHETER IN VEIN: CPT | Performed by: RADIOLOGY

## 2024-01-18 PROCEDURE — 2500000004 HC RX 250 GENERAL PHARMACY W/ HCPCS (ALT 636 FOR OP/ED): Performed by: NURSE PRACTITIONER

## 2024-01-18 PROCEDURE — 2500000005 HC RX 250 GENERAL PHARMACY W/O HCPCS: Performed by: RADIOLOGY

## 2024-01-18 PROCEDURE — C1788 PORT, INDWELLING, IMP: HCPCS | Performed by: RADIOLOGY

## 2024-01-18 PROCEDURE — 2500000004 HC RX 250 GENERAL PHARMACY W/ HCPCS (ALT 636 FOR OP/ED): Performed by: RADIOLOGY

## 2024-01-18 PROCEDURE — 99152 MOD SED SAME PHYS/QHP 5/>YRS: CPT | Performed by: RADIOLOGY

## 2024-01-18 PROCEDURE — 76937 US GUIDE VASCULAR ACCESS: CPT | Performed by: RADIOLOGY

## 2024-01-18 PROCEDURE — 36561 INSERT TUNNELED CV CATH: CPT | Performed by: RADIOLOGY

## 2024-01-18 PROCEDURE — C1894 INTRO/SHEATH, NON-LASER: HCPCS | Performed by: RADIOLOGY

## 2024-01-18 PROCEDURE — 2780000003 HC OR 278 NO HCPCS: Performed by: RADIOLOGY

## 2024-01-18 PROCEDURE — 99221 1ST HOSP IP/OBS SF/LOW 40: CPT | Performed by: NURSE PRACTITIONER

## 2024-01-18 PROCEDURE — 77001 FLUOROGUIDE FOR VEIN DEVICE: CPT | Performed by: RADIOLOGY

## 2024-01-18 RX ORDER — MIDAZOLAM HYDROCHLORIDE 1 MG/ML
INJECTION, SOLUTION INTRAMUSCULAR; INTRAVENOUS
Status: COMPLETED | OUTPATIENT
Start: 2024-01-18 | End: 2024-01-18

## 2024-01-18 RX ORDER — FENTANYL CITRATE 50 UG/ML
INJECTION, SOLUTION INTRAMUSCULAR; INTRAVENOUS
Status: COMPLETED | OUTPATIENT
Start: 2024-01-18 | End: 2024-01-18

## 2024-01-18 RX ORDER — PROCHLORPERAZINE MALEATE 10 MG
10 TABLET ORAL EVERY 6 HOURS PRN
Status: CANCELLED | OUTPATIENT
Start: 2024-01-22

## 2024-01-18 RX ORDER — HEPARIN 100 UNIT/ML
500 SYRINGE INTRAVENOUS ONCE AS NEEDED
Status: COMPLETED | OUTPATIENT
Start: 2024-01-18 | End: 2024-01-18

## 2024-01-18 RX ORDER — FLUOROURACIL 50 MG/ML
400 INJECTION, SOLUTION INTRAVENOUS ONCE
Status: CANCELLED | OUTPATIENT
Start: 2024-01-22

## 2024-01-18 RX ORDER — FAMOTIDINE 10 MG/ML
20 INJECTION INTRAVENOUS ONCE AS NEEDED
Status: CANCELLED | OUTPATIENT
Start: 2024-01-22

## 2024-01-18 RX ORDER — PROCHLORPERAZINE EDISYLATE 5 MG/ML
10 INJECTION INTRAMUSCULAR; INTRAVENOUS EVERY 6 HOURS PRN
Status: CANCELLED | OUTPATIENT
Start: 2024-01-22

## 2024-01-18 RX ORDER — LORAZEPAM 2 MG/ML
1 INJECTION INTRAMUSCULAR AS NEEDED
Status: CANCELLED | OUTPATIENT
Start: 2024-01-22

## 2024-01-18 RX ORDER — EPINEPHRINE 0.3 MG/.3ML
0.3 INJECTION SUBCUTANEOUS EVERY 5 MIN PRN
Status: CANCELLED | OUTPATIENT
Start: 2024-01-22

## 2024-01-18 RX ORDER — DIPHENHYDRAMINE HYDROCHLORIDE 50 MG/ML
50 INJECTION INTRAMUSCULAR; INTRAVENOUS AS NEEDED
Status: CANCELLED | OUTPATIENT
Start: 2024-01-22

## 2024-01-18 RX ORDER — ALBUTEROL SULFATE 0.83 MG/ML
3 SOLUTION RESPIRATORY (INHALATION) AS NEEDED
Status: CANCELLED | OUTPATIENT
Start: 2024-01-22

## 2024-01-18 RX ORDER — PALONOSETRON 0.05 MG/ML
0.25 INJECTION, SOLUTION INTRAVENOUS ONCE
Status: CANCELLED | OUTPATIENT
Start: 2024-01-22

## 2024-01-18 RX ORDER — LIDOCAINE HYDROCHLORIDE AND EPINEPHRINE 10; 10 MG/ML; UG/ML
INJECTION, SOLUTION INFILTRATION; PERINEURAL
Status: COMPLETED | OUTPATIENT
Start: 2024-01-18 | End: 2024-01-18

## 2024-01-18 RX ORDER — ONDANSETRON HYDROCHLORIDE 2 MG/ML
4 INJECTION, SOLUTION INTRAVENOUS EVERY 6 HOURS PRN
Status: DISCONTINUED | OUTPATIENT
Start: 2024-01-18 | End: 2024-01-19 | Stop reason: HOSPADM

## 2024-01-18 RX ADMIN — HEPARIN 500 UNITS: 100 SYRINGE at 14:08

## 2024-01-18 RX ADMIN — MIDAZOLAM 1 MG: 1 INJECTION INTRAMUSCULAR; INTRAVENOUS at 13:56

## 2024-01-18 RX ADMIN — FENTANYL CITRATE 100 MCG: 50 INJECTION, SOLUTION INTRAMUSCULAR; INTRAVENOUS at 13:56

## 2024-01-18 RX ADMIN — LIDOCAINE HYDROCHLORIDE,EPINEPHRINE BITARTRATE 10 ML: 10; .01 INJECTION, SOLUTION INFILTRATION; PERINEURAL at 13:58

## 2024-01-18 ASSESSMENT — ENCOUNTER SYMPTOMS
GASTROINTESTINAL NEGATIVE: 1
RESPIRATORY NEGATIVE: 1
CONSTITUTIONAL NEGATIVE: 1
CARDIOVASCULAR NEGATIVE: 1
NEUROLOGICAL NEGATIVE: 1

## 2024-01-18 ASSESSMENT — PAIN SCALES - GENERAL
PAINLEVEL_OUTOF10: 10 - WORST POSSIBLE PAIN
PAINLEVEL_OUTOF10: 8
PAINLEVEL_OUTOF10: 10 - WORST POSSIBLE PAIN
PAINLEVEL_OUTOF10: 10 - WORST POSSIBLE PAIN
PAINLEVEL_OUTOF10: 6
PAINLEVEL_OUTOF10: 10 - WORST POSSIBLE PAIN
PAINLEVEL_OUTOF10: 10 - WORST POSSIBLE PAIN

## 2024-01-18 ASSESSMENT — PAIN - FUNCTIONAL ASSESSMENT
PAIN_FUNCTIONAL_ASSESSMENT: 0-10

## 2024-01-18 ASSESSMENT — PAIN DESCRIPTION - DESCRIPTORS: DESCRIPTORS: ACHING

## 2024-01-18 NOTE — POST-PROCEDURE NOTE
Interventional Radiology Brief Postprocedure Note    Attending: Sal Mercedes MD      Assistant: none    Diagnosis: colon cancer    Description of procedure: port placement     Anesthesia:  Local, moderate sedation    Complications: None    Estimated Blood Loss: minimal    Medications (Filter: Administrations occurring from 1345 to 1415 on 01/18/24) As of 01/18/24 1415      heparin flush 100 unit/mL syringe 500 Units (Units) Total dose:  500 Units      Date/Time Rate/Dose/Volume Action       01/18/24  1408 500 Units Given               midazolam (Versed) injection (mg) Total dose:  1 mg      Date/Time Rate/Dose/Volume Action       01/18/24  1356 1 mg Given               fentaNYL PF (Sublimaze) injection (mcg) Total dose:  100 mcg      Date/Time Rate/Dose/Volume Action       01/18/24  1356 100 mcg Given               lidocaine-epinephrine (Xylocaine W/EPI) 1 %-1:100,000 injection (mL) Total volume:  10 mL      Date/Time Rate/Dose/Volume Action       01/18/24  1358 10 mL Given                   No specimens collected      See detailed result report with images in PACS.    The patient tolerated the procedure well without incident or complication and is in stable condition.

## 2024-01-18 NOTE — H&P
History Of Present Illness  Marisabel Richardson is a 73 y.o. female presenting with sigmoid colon adenocarcinoma s/p resection in 11/2023, pT3 pN1 (low risk stage III) proficient MMR. Her oncologist ordered adjuvant chemotherapy based on the current NCCN guidelines. She presents for port placement.      Past Medical History  Past Medical History:   Diagnosis Date    Anxiety     Colon polyp     Depression     HLD (hyperlipidemia)     Hypothyroidism     Mass of colon     sigmoid colon       Surgical History  Past Surgical History:   Procedure Laterality Date    CATARACT EXTRACTION Bilateral     COLONOSCOPY      HYSTERECTOMY          Social History  She reports that she quit smoking about 2 months ago. Her smoking use included cigarettes. She smoked an average of 1 pack per day. She has never used smokeless tobacco. She reports that she does not drink alcohol and does not use drugs.    Family History  Family History   Problem Relation Name Age of Onset    No Known Problems Mother      Cancer Father      Diabetes Father      Diabetes Sister      Diabetes Brother          Allergies  Patient has no known allergies.    Review of Systems   Constitutional: Negative.    Respiratory: Negative.     Cardiovascular: Negative.    Gastrointestinal: Negative.    Genitourinary: Negative.    Neurological: Negative.         Physical Exam  Constitutional:       Appearance: Normal appearance.   HENT:      Head: Normocephalic and atraumatic.      Nose: Nose normal. No congestion or rhinorrhea.      Mouth/Throat:      Mouth: Mucous membranes are moist.   Cardiovascular:      Rate and Rhythm: Normal rate and regular rhythm.      Pulses: Normal pulses.      Heart sounds: Normal heart sounds. No murmur heard.  Pulmonary:      Effort: Pulmonary effort is normal.      Breath sounds: Normal breath sounds.   Abdominal:      General: There is no distension.      Palpations: Abdomen is soft.      Tenderness: There is no abdominal tenderness.  "  Musculoskeletal:      Cervical back: Normal range of motion.   Skin:     General: Skin is warm and dry.      Capillary Refill: Capillary refill takes less than 2 seconds.   Neurological:      General: No focal deficit present.      Mental Status: She is alert.   Psychiatric:         Mood and Affect: Mood normal.         Behavior: Behavior normal.          Last Recorded Vitals  Blood pressure 146/75, pulse 92, temperature 36.8 °C (98.2 °F), temperature source Tympanic, resp. rate 15, height 1.753 m (5' 9\"), weight 57.6 kg (127 lb), SpO2 99 %.    Relevant Results       Assessment/Plan   sigmoid colon adenocarcinoma     Plan: port placement        I spent 15 minutes in the professional and overall care of this patient.      Katelyn Gutierrez, APRN-CNP    "

## 2024-01-18 NOTE — DISCHARGE INSTRUCTIONS
Instructions after Your Port Placement   Today, you had your port placed in Interventional Radiology/Specials department. Your port will be used for blood draws, imaging studies like CT-scans or MRIs and used for infusions and/ or chemotherapy etc.     Activity  For the first day, rest with light walking as tolerated. You might feel more tired than usual.   Do not lift anything heavier than 10 lbs. (around the weight of 1 gallon milk jug) for 24 hours. You can lift weights heavier than 10 lbs. on ___Friday, 1/19/24____      .  Do not drive for the first 24 hours. You can drive on __Friday, 1/19/24______________.   The following day after the procedure, you can get back to your normal activities.    Port incision and neck area care   Keep your dressing over your chest area clean, dry and in place for 5-7 days. If you have a scheduled infusion before your dressing removal date, it is okay to let the staff remove your dressing and use your port.   You may remove chest dressing on __Tuesday, 1/23/24___________ and leave the area uncovered.   You may remove your clear neck dressing 2 days after your procedure.   You may remove neck dressing on _Sunday, 1/21/24___________ and leave the area uncovered.   Do not remove the strips of tape on your chest or neck area. Let theses fall off on their own. Do not peel off any glue that might be over your incision.  The stitches used to close the skin around the port will dissolve on their own.   You may shower 3 days after your procedure. Continue to protect the dressing from direct water.   Do not submerge your port area in bath tub, swimming pool or hot tub until it is fully healed.   Once you remove a dressing, gently clean the area with soap and water and pat it dry with clean towel.   Check your port area every day for any signs of infection   Redness   Drainage  Increased warmth around the port site   Pus or foul odor   Fever or chills or increased pain at the port  site  If you have any of these signs or symptoms of infection: Please call and leave a message for our IR/Specials Nurse Practitioner Angelica Matt at [phone]. She will return your call the same business day. If unable to reach Angelica, please call [phone] and ask to speak with a specials nurse. We are here M-F 7-4 PM.    Pain  After your procedure, when the numbing medicine wears off you might experience mild to moderate pain in the neck and chest area.   To help with pain you may apply ice pack 3 to 4 times a day for 20 minutes at a time to the chest and neck area. Be careful to not get the dressing area wet.      Medications for pain  Please refer to the medications on your discharge paperwork   If you are having pain, please take what you normally would to help relieve your pain.   If you have a prescription medication for chronic pain, continue taking your current regiment and that will help reduce or alleviate pain at the site of the incision as well.   Pain at the incision typically lasts 3-7 days and continues to lessen each day      Bleeding  If you have oozing from the port site that extends to the edges of the dressing. Hold pressure over the neck and chest area. If oozing does not stop in 5 minutes, call 911 or come straight to the emergency room to be evaluated.

## 2024-01-22 ENCOUNTER — OFFICE VISIT (OUTPATIENT)
Dept: HEMATOLOGY/ONCOLOGY | Facility: CLINIC | Age: 74
End: 2024-01-22
Payer: MEDICARE

## 2024-01-22 ENCOUNTER — INFUSION (OUTPATIENT)
Dept: HEMATOLOGY/ONCOLOGY | Facility: CLINIC | Age: 74
End: 2024-01-22
Payer: MEDICARE

## 2024-01-22 VITALS
TEMPERATURE: 97.2 F | DIASTOLIC BLOOD PRESSURE: 75 MMHG | HEART RATE: 81 BPM | WEIGHT: 126.65 LBS | RESPIRATION RATE: 18 BRPM | SYSTOLIC BLOOD PRESSURE: 127 MMHG | BODY MASS INDEX: 18.7 KG/M2 | OXYGEN SATURATION: 98 %

## 2024-01-22 DIAGNOSIS — C18.7 MALIGNANT NEOPLASM OF SIGMOID COLON (MULTI): ICD-10-CM

## 2024-01-22 DIAGNOSIS — C18.7 MALIGNANT NEOPLASM OF SIGMOID COLON (MULTI): Primary | ICD-10-CM

## 2024-01-22 LAB
ALBUMIN SERPL BCP-MCNC: 4 G/DL (ref 3.4–5)
ALP SERPL-CCNC: 60 U/L (ref 33–136)
ALT SERPL W P-5'-P-CCNC: 9 U/L (ref 7–45)
ANION GAP SERPL CALC-SCNC: 13 MMOL/L (ref 10–20)
AST SERPL W P-5'-P-CCNC: 12 U/L (ref 9–39)
BASOPHILS # BLD AUTO: 0.04 X10*3/UL (ref 0–0.1)
BASOPHILS NFR BLD AUTO: 0.7 %
BILIRUB SERPL-MCNC: 0.4 MG/DL (ref 0–1.2)
BUN SERPL-MCNC: 12 MG/DL (ref 6–23)
CALCIUM SERPL-MCNC: 9.2 MG/DL (ref 8.6–10.3)
CHLORIDE SERPL-SCNC: 103 MMOL/L (ref 98–107)
CO2 SERPL-SCNC: 27 MMOL/L (ref 21–32)
CREAT SERPL-MCNC: 0.64 MG/DL (ref 0.5–1.05)
EGFRCR SERPLBLD CKD-EPI 2021: >90 ML/MIN/1.73M*2
EOSINOPHIL # BLD AUTO: 0.11 X10*3/UL (ref 0–0.4)
EOSINOPHIL NFR BLD AUTO: 2.1 %
ERYTHROCYTE [DISTWIDTH] IN BLOOD BY AUTOMATED COUNT: 12.3 % (ref 11.5–14.5)
GLUCOSE SERPL-MCNC: 114 MG/DL (ref 74–99)
HBV SURFACE AG SERPL QL IA: NONREACTIVE
HCT VFR BLD AUTO: 41.4 % (ref 36–46)
HGB BLD-MCNC: 14.1 G/DL (ref 12–16)
IMM GRANULOCYTES # BLD AUTO: 0 X10*3/UL (ref 0–0.5)
IMM GRANULOCYTES NFR BLD AUTO: 0 % (ref 0–0.9)
LYMPHOCYTES # BLD AUTO: 1.42 X10*3/UL (ref 0.8–3)
LYMPHOCYTES NFR BLD AUTO: 26.5 %
MCH RBC QN AUTO: 31 PG (ref 26–34)
MCHC RBC AUTO-ENTMCNC: 34.1 G/DL (ref 32–36)
MCV RBC AUTO: 91 FL (ref 80–100)
MONOCYTES # BLD AUTO: 0.65 X10*3/UL (ref 0.05–0.8)
MONOCYTES NFR BLD AUTO: 12.1 %
NEUTROPHILS # BLD AUTO: 3.13 X10*3/UL (ref 1.6–5.5)
NEUTROPHILS NFR BLD AUTO: 58.6 %
NRBC BLD-RTO: 0 /100 WBCS (ref 0–0)
PLATELET # BLD AUTO: 208 X10*3/UL (ref 150–450)
POTASSIUM SERPL-SCNC: 3.7 MMOL/L (ref 3.5–5.3)
PROT SERPL-MCNC: 6.4 G/DL (ref 6.4–8.2)
RBC # BLD AUTO: 4.55 X10*6/UL (ref 4–5.2)
SODIUM SERPL-SCNC: 139 MMOL/L (ref 136–145)
WBC # BLD AUTO: 5.4 X10*3/UL (ref 4.4–11.3)

## 2024-01-22 PROCEDURE — 96366 THER/PROPH/DIAG IV INF ADDON: CPT | Mod: INF

## 2024-01-22 PROCEDURE — 1036F TOBACCO NON-USER: CPT

## 2024-01-22 PROCEDURE — 1160F RVW MEDS BY RX/DR IN RCRD: CPT

## 2024-01-22 PROCEDURE — 96367 TX/PROPH/DG ADDL SEQ IV INF: CPT | Mod: INF

## 2024-01-22 PROCEDURE — 87340 HEPATITIS B SURFACE AG IA: CPT

## 2024-01-22 PROCEDURE — 96411 CHEMO IV PUSH ADDL DRUG: CPT

## 2024-01-22 PROCEDURE — 96376 TX/PRO/DX INJ SAME DRUG ADON: CPT

## 2024-01-22 PROCEDURE — 99214 OFFICE O/P EST MOD 30 MIN: CPT | Mod: 25

## 2024-01-22 PROCEDURE — 1126F AMNT PAIN NOTED NONE PRSNT: CPT

## 2024-01-22 PROCEDURE — 99214 OFFICE O/P EST MOD 30 MIN: CPT

## 2024-01-22 PROCEDURE — 96368 THER/DIAG CONCURRENT INF: CPT

## 2024-01-22 PROCEDURE — 2500000004 HC RX 250 GENERAL PHARMACY W/ HCPCS (ALT 636 FOR OP/ED)

## 2024-01-22 PROCEDURE — 96413 CHEMO IV INFUSION 1 HR: CPT

## 2024-01-22 PROCEDURE — 96375 TX/PRO/DX INJ NEW DRUG ADDON: CPT | Mod: INF

## 2024-01-22 PROCEDURE — 80053 COMPREHEN METABOLIC PANEL: CPT

## 2024-01-22 PROCEDURE — 85025 COMPLETE CBC W/AUTO DIFF WBC: CPT

## 2024-01-22 PROCEDURE — 96415 CHEMO IV INFUSION ADDL HR: CPT

## 2024-01-22 PROCEDURE — 1159F MED LIST DOCD IN RCRD: CPT

## 2024-01-22 RX ORDER — ALBUTEROL SULFATE 0.83 MG/ML
3 SOLUTION RESPIRATORY (INHALATION) AS NEEDED
Status: DISCONTINUED | OUTPATIENT
Start: 2024-01-22 | End: 2024-01-22 | Stop reason: HOSPADM

## 2024-01-22 RX ORDER — PROCHLORPERAZINE MALEATE 10 MG
10 TABLET ORAL EVERY 6 HOURS PRN
Status: DISCONTINUED | OUTPATIENT
Start: 2024-01-22 | End: 2024-01-22 | Stop reason: HOSPADM

## 2024-01-22 RX ORDER — ONDANSETRON HYDROCHLORIDE 8 MG/1
8 TABLET, FILM COATED ORAL EVERY 8 HOURS PRN
Qty: 30 TABLET | Refills: 3 | Status: SHIPPED | OUTPATIENT
Start: 2024-01-22

## 2024-01-22 RX ORDER — LIDOCAINE AND PRILOCAINE 25; 25 MG/G; MG/G
CREAM TOPICAL
Qty: 30 G | Refills: 2 | Status: SHIPPED | OUTPATIENT
Start: 2024-01-22

## 2024-01-22 RX ORDER — EPINEPHRINE 0.3 MG/.3ML
0.3 INJECTION SUBCUTANEOUS EVERY 5 MIN PRN
Status: DISCONTINUED | OUTPATIENT
Start: 2024-01-22 | End: 2024-01-22 | Stop reason: HOSPADM

## 2024-01-22 RX ORDER — PROCHLORPERAZINE EDISYLATE 5 MG/ML
10 INJECTION INTRAMUSCULAR; INTRAVENOUS EVERY 6 HOURS PRN
Status: DISCONTINUED | OUTPATIENT
Start: 2024-01-22 | End: 2024-01-22 | Stop reason: HOSPADM

## 2024-01-22 RX ORDER — DIPHENHYDRAMINE HYDROCHLORIDE 50 MG/ML
50 INJECTION INTRAMUSCULAR; INTRAVENOUS AS NEEDED
Status: DISCONTINUED | OUTPATIENT
Start: 2024-01-22 | End: 2024-01-22 | Stop reason: HOSPADM

## 2024-01-22 RX ORDER — HYOSCYAMINE SULFATE 0.12 MG/1
0.12 TABLET, ORALLY DISINTEGRATING ORAL EVERY 4 HOURS PRN
COMMUNITY

## 2024-01-22 RX ORDER — LORAZEPAM 2 MG/ML
1 INJECTION INTRAMUSCULAR AS NEEDED
Status: DISCONTINUED | OUTPATIENT
Start: 2024-01-22 | End: 2024-01-22 | Stop reason: HOSPADM

## 2024-01-22 RX ORDER — FAMOTIDINE 10 MG/ML
20 INJECTION INTRAVENOUS ONCE AS NEEDED
Status: DISCONTINUED | OUTPATIENT
Start: 2024-01-22 | End: 2024-01-22 | Stop reason: HOSPADM

## 2024-01-22 RX ORDER — FLUOROURACIL 50 MG/ML
400 INJECTION, SOLUTION INTRAVENOUS ONCE
Status: COMPLETED | OUTPATIENT
Start: 2024-01-22 | End: 2024-01-22

## 2024-01-22 RX ORDER — PALONOSETRON 0.05 MG/ML
0.25 INJECTION, SOLUTION INTRAVENOUS ONCE
Status: COMPLETED | OUTPATIENT
Start: 2024-01-22 | End: 2024-01-22

## 2024-01-22 RX ADMIN — PROCHLORPERAZINE EDISYLATE 10 MG: 5 INJECTION INTRAMUSCULAR; INTRAVENOUS at 13:08

## 2024-01-22 RX ADMIN — OXALIPLATIN 145 MG: 5 INJECTION, SOLUTION INTRAVENOUS at 12:02

## 2024-01-22 RX ADMIN — DEXAMETHASONE SODIUM PHOSPHATE 12 MG: 10 INJECTION, SOLUTION INTRAMUSCULAR; INTRAVENOUS at 11:24

## 2024-01-22 RX ADMIN — FLUOROURACIL 4150 MG: 50 INJECTION, SOLUTION INTRAVENOUS at 14:35

## 2024-01-22 RX ADMIN — FLUOROURACIL 700 MG: 50 INJECTION, SOLUTION INTRAVENOUS at 14:35

## 2024-01-22 RX ADMIN — PALONOSETRON HYDROCHLORIDE 250 MCG: 0.25 INJECTION INTRAVENOUS at 11:22

## 2024-01-22 RX ADMIN — LEUCOVORIN CALCIUM 680 MG: 350 INJECTION, POWDER, LYOPHILIZED, FOR SUSPENSION INTRAMUSCULAR; INTRAVENOUS at 12:02

## 2024-01-22 ASSESSMENT — ENCOUNTER SYMPTOMS
OCCASIONAL FEELINGS OF UNSTEADINESS: 0
DEPRESSION: 0
LOSS OF SENSATION IN FEET: 0

## 2024-01-22 ASSESSMENT — PATIENT HEALTH QUESTIONNAIRE - PHQ9
SUM OF ALL RESPONSES TO PHQ9 QUESTIONS 1 AND 2: 0
2. FEELING DOWN, DEPRESSED OR HOPELESS: NOT AT ALL
1. LITTLE INTEREST OR PLEASURE IN DOING THINGS: NOT AT ALL

## 2024-01-22 ASSESSMENT — COLUMBIA-SUICIDE SEVERITY RATING SCALE - C-SSRS
1. IN THE PAST MONTH, HAVE YOU WISHED YOU WERE DEAD OR WISHED YOU COULD GO TO SLEEP AND NOT WAKE UP?: NO
6. HAVE YOU EVER DONE ANYTHING, STARTED TO DO ANYTHING, OR PREPARED TO DO ANYTHING TO END YOUR LIFE?: NO
2. HAVE YOU ACTUALLY HAD ANY THOUGHTS OF KILLING YOURSELF?: NO

## 2024-01-22 ASSESSMENT — PAIN SCALES - GENERAL: PAINLEVEL: 0-NO PAIN

## 2024-01-22 NOTE — PROGRESS NOTES
1305 - patient with complaints of nausea - doesn't think she actually needs to throw up, but does feel nauseated. Patient given PRN compazine IVP. Patient states she deals with nausea and cramping daily at home due to cancer.

## 2024-01-22 NOTE — PROGRESS NOTES
Patient ID: Marisabel Richardson is a 73 y.o. female.    Diagnoses:   1. 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  Proficient MMR.  2.  A large goiter with a history of hypothyroidism-currently on replacement dose of thyroxine.    Genomic profile: pMMR.    Assessment and Plan:  This is a pleasant woman who presented with a diagnosis of localized sigmoid colon adenocarcinoma.  She had a colonoscopy in October 2023 when she had abdominal pain.  Colonoscopy revealed multiple colonic polyps and a sigmoid colon mass.  Biopsy confirmed adenocarcinoma, proficient MMR.  Subsequently on November 15, 2023 she underwent colectomy by Dr. Lev Stanton.  She recovered well from the surgery.    We discussed the adjuvant treatment plan with her.  We recommended adjuvant chemotherapy based on the current NCCN guidelines since she has involvement of the lymph node.  We recommended FOLFOX for 6 cycles.  Discussed the chemotherapy regimen in detail. She had her mediport placed on 1/18/24 and presents today to start cycle 1.     Labs reviewed and in parameters. We will proceed with cycle 1 as planned.     Follow-up:  2 weeks for cycle 2 with labs and exam. To note, we are looking to move treatments closer to home at [facility] per patient request. Hopefully we can start this with cycle 2 or 3.     I have placed all orders as outlined above. I advised the patient to schedule the tests and follow-up appointment as discussed by contacting the  on the way out or calling by phone.    Providers:  Sur[facility]: Lev Brothers.  [facility]   [facility]:    Chief complaint:  Resected sigmoid colon adenocarcinoma.    HPI:  ONCOLOGIC HISTORY-  October 2, 2023: Had a colonoscopy in an outside facility when she presented with abdominal pain.  Colonoscopy showed a sigmoid mass.  Biopsy confirmed adenocarcinoma.  She also also had multiple polyps in her colon some of which have been  removed.    2023: She had a CT scan of chest abdomen pelvis which did not show any distant metastatic disease.  CT scan showed a large goiter.  She tells me that she has an endocrinologist who she follows up with.    November 15, 2023: She underwent colectomy.  Pathology confirmed pT3 pN1 adenocarcinoma without any involvement of the margins.    Interval history:  Marisabel presents today to start cycle 1 of FOLFOX chemotherapy. Overall she feels well and ready to start treatment. Denies any issues with fevers, chills, chest pain, SOB, N/V, diarrhea, constipation, urianry symptoms, skin rashes, or bleeding/bruising issues.     Review of systems: Negative unless otherwise stated in HPI     Past Medical History:   Past Medical History:  No date: Anxiety  No date: Colon polyp  No date: Depression  No date: HLD (hyperlipidemia)  No date: Hypothyroidism  No date: Mass of colon      Comment:  sigmoid colon   Surgical History:    Past Surgical History:   Procedure Laterality Date    CATARACT EXTRACTION Bilateral     COLONOSCOPY      HYSTERECTOMY        Family History:    Family History   Problem Relation Name Age of Onset    No Known Problems Mother      Cancer Father      Diabetes Father      Diabetes Sister      Diabetes Brother       Family Oncology History:    Cancer-related family history includes Cancer in her father.  Social History:    Social History     Tobacco Use    Smoking status: Former     Packs/day: 1     Types: Cigarettes     Quit date: 2023     Years since quittin.2    Smokeless tobacco: Never   Vaping Use    Vaping Use: Never used   Substance Use Topics    Alcohol use: Never    Drug use: Never        Allergies  No Known Allergies     Medications  Current Outpatient Medications   Medication Instructions    amitriptyline (Elavil) 25 mg tablet 2 times daily    atorvastatin (LIPITOR) 20 mg, oral, Daily    dicyclomine (Bentyl) 20 mg tablet oral, 3 times daily PRN    levothyroxine (SYNTHROID,  LEVOXYL) 25 mcg, oral    lidocaine-prilocaine (Emla) 2.5-2.5 % cream Apply to chest site 30-45 minutes prior to port being accessed for use    loperamide (Imodium) 2 mg capsule Take 2 capsules (4 mg) by mouth with the first episode of diarrhea and 1 capsule (2 mg) by mouth with any additional episodes. Maximum 8 capsules (16 mg) per day.    magnesium hydroxide (Milk of Magnesia) 400 mg/5 mL suspension oral, Daily PRN    multivitamin tablet 1 tablet, oral, Daily    ondansetron (ZOFRAN) 8 mg, oral, Every 8 hours PRN    ondansetron ODT (ZOFRAN-ODT) 4 mg, oral, Every 8 hours PRN    prochlorperazine (COMPAZINE) 10 mg, oral, Every 6 hours PRN    sertraline (ZOLOFT) 100 mg, oral, Daily     Objective   VS: /75 (BP Location: Left arm, Patient Position: Sitting, BP Cuff Size: Small adult)   Pulse 81   Temp 36.2 °C (97.2 °F) (Temporal)   Resp 18   Wt 57.5 kg (126 lb 10.5 oz)   SpO2 98%   BMI 18.70 kg/m²   Weight:   Vitals:    01/22/24 0940   Weight: 57.5 kg (126 lb 10.5 oz)        PHYSICAL EXAMINATION  ECOG performance status-0.  VS:  /75 (BP Location: Left arm, Patient Position: Sitting, BP Cuff Size: Small adult)   Pulse 81   Temp 36.2 °C (97.2 °F) (Temporal)   Resp 18   Wt 57.5 kg (126 lb 10.5 oz)   SpO2 98%   BMI 18.70 kg/m²   BSA: 1.67 meters squared  Pain Scale: 0.    Constitutional: Awake/alert/oriented x3, cooperative and answers questions appropriately.     Eyes: No pallor, conjunctival injection, clear sclera.    Mouth: No ulceration. No thrush.     Head/Neck: She has a large goiter.     Respiratory/Thorax: Normal breath sounds with bilaterally symmetrical chest expansion. No dullness.      Cardiovascular: No audible murmurs, normal heart sounds. No pericardial rub.     Gastrointestinal: Nondistended, soft, non-tender, no rebound tenderness or guarding, no masses palpable, no organomegaly, +BS, no bruits. No ascites.     Musculoskeletal: No joint swelling, redness.      Extremities: normal  extremities, no cyanosis edema, contusions or wounds, no clubbing.     Lymphatic: No significant lymphadenopathy.     Skin: Warm and dry, no lesions, no rashes.     Labs  Results from last 7 days   Lab Units 01/22/24  1024   WBC AUTO x10*3/uL 5.4   HEMOGLOBIN g/dL 14.1   HEMATOCRIT % 41.4   PLATELETS AUTO x10*3/uL 208   NEUTROS ABS x10*3/uL 3.13   LYMPHS ABS AUTO x10*3/uL 1.42   MONOS ABS AUTO x10*3/uL 0.65   EOS ABS AUTO x10*3/uL 0.11   NEUTROS PCT AUTO % 58.6   LYMPHS PCT AUTO % 26.5   MONOS PCT AUTO % 12.1   EOS PCT AUTO % 2.1      Results from last 7 days   Lab Units 01/22/24  1024   GLUCOSE mg/dL 114*   SODIUM mmol/L 139   POTASSIUM mmol/L 3.7   CHLORIDE mmol/L 103   CO2 mmol/L 27   BUN mg/dL 12   CREATININE mg/dL 0.64   EGFR mL/min/1.73m*2 >90   CALCIUM mg/dL 9.2   ALBUMIN g/dL 4.0   PROTEIN TOTAL g/dL 6.4   BILIRUBIN TOTAL mg/dL 0.4   ALK PHOS U/L 60   ALT U/L 9   AST U/L 12     Lyubov Rostocil APRN-CNP

## 2024-01-24 ENCOUNTER — APPOINTMENT (OUTPATIENT)
Dept: HEMATOLOGY/ONCOLOGY | Facility: CLINIC | Age: 74
End: 2024-01-24
Payer: MEDICARE

## 2024-01-24 ENCOUNTER — INFUSION (OUTPATIENT)
Dept: HEMATOLOGY/ONCOLOGY | Facility: CLINIC | Age: 74
End: 2024-01-24
Payer: MEDICARE

## 2024-01-24 VITALS
DIASTOLIC BLOOD PRESSURE: 70 MMHG | SYSTOLIC BLOOD PRESSURE: 118 MMHG | OXYGEN SATURATION: 99 % | RESPIRATION RATE: 16 BRPM | HEART RATE: 88 BPM | TEMPERATURE: 98.1 F

## 2024-01-24 DIAGNOSIS — C18.7 MALIGNANT NEOPLASM OF SIGMOID COLON (MULTI): ICD-10-CM

## 2024-01-24 DIAGNOSIS — C18.9 MALIGNANT NEOPLASM OF COLON, UNSPECIFIED PART OF COLON (MULTI): ICD-10-CM

## 2024-01-24 PROCEDURE — 2500000004 HC RX 250 GENERAL PHARMACY W/ HCPCS (ALT 636 FOR OP/ED): Performed by: INTERNAL MEDICINE

## 2024-01-24 PROCEDURE — 96523 IRRIG DRUG DELIVERY DEVICE: CPT

## 2024-01-24 RX ORDER — HEPARIN 100 UNIT/ML
500 SYRINGE INTRAVENOUS AS NEEDED
Status: CANCELLED | OUTPATIENT
Start: 2024-01-24

## 2024-01-24 RX ORDER — HEPARIN SODIUM,PORCINE/PF 10 UNIT/ML
50 SYRINGE (ML) INTRAVENOUS AS NEEDED
Status: CANCELLED | OUTPATIENT
Start: 2024-01-24

## 2024-01-24 RX ORDER — HEPARIN 100 UNIT/ML
500 SYRINGE INTRAVENOUS AS NEEDED
Status: DISCONTINUED | OUTPATIENT
Start: 2024-01-24 | End: 2024-01-24 | Stop reason: HOSPADM

## 2024-01-24 RX ADMIN — Medication 500 UNITS: at 13:01

## 2024-01-24 ASSESSMENT — PAIN SCALES - GENERAL: PAINLEVEL: 0-NO PAIN

## 2024-01-24 NOTE — PROGRESS NOTES
Pt here for pump disconnect, pt tolerated infusion well with some nausea, controlled bt medication at home. On port deaccess, noted that there is some crusting at at port incision, well approximated. Pt instructed to call office if drainage present, red or tender. Pt is aware of plan of care, will call with further questions or concerns.

## 2024-02-05 ENCOUNTER — TELEMEDICINE (OUTPATIENT)
Dept: HEMATOLOGY/ONCOLOGY | Facility: CLINIC | Age: 74
End: 2024-02-05
Payer: MEDICARE

## 2024-02-05 ENCOUNTER — APPOINTMENT (OUTPATIENT)
Dept: HEMATOLOGY/ONCOLOGY | Facility: CLINIC | Age: 74
End: 2024-02-05
Payer: MEDICARE

## 2024-02-05 ENCOUNTER — INFUSION (OUTPATIENT)
Dept: HEMATOLOGY/ONCOLOGY | Facility: CLINIC | Age: 74
End: 2024-02-05
Payer: MEDICARE

## 2024-02-05 VITALS
SYSTOLIC BLOOD PRESSURE: 122 MMHG | HEART RATE: 104 BPM | RESPIRATION RATE: 16 BRPM | BODY MASS INDEX: 19.56 KG/M2 | WEIGHT: 124.6 LBS | TEMPERATURE: 98.1 F | OXYGEN SATURATION: 99 % | HEIGHT: 67 IN | DIASTOLIC BLOOD PRESSURE: 67 MMHG

## 2024-02-05 DIAGNOSIS — C18.7 MALIGNANT NEOPLASM OF SIGMOID COLON (MULTI): Primary | ICD-10-CM

## 2024-02-05 DIAGNOSIS — C18.7 MALIGNANT NEOPLASM OF SIGMOID COLON (MULTI): ICD-10-CM

## 2024-02-05 LAB
ALBUMIN SERPL BCP-MCNC: 3.7 G/DL (ref 3.4–5)
ALP SERPL-CCNC: 68 U/L (ref 33–136)
ALT SERPL W P-5'-P-CCNC: 12 U/L (ref 7–45)
ANION GAP SERPL CALC-SCNC: 9 MMOL/L (ref 10–20)
AST SERPL W P-5'-P-CCNC: 15 U/L (ref 9–39)
BASOPHILS # BLD AUTO: 0.03 X10*3/UL (ref 0–0.1)
BASOPHILS NFR BLD AUTO: 0.9 %
BILIRUB SERPL-MCNC: 0.4 MG/DL (ref 0–1.2)
BUN SERPL-MCNC: 11 MG/DL (ref 6–23)
CALCIUM SERPL-MCNC: 8.9 MG/DL (ref 8.6–10.3)
CHLORIDE SERPL-SCNC: 108 MMOL/L (ref 98–107)
CO2 SERPL-SCNC: 28 MMOL/L (ref 21–32)
CREAT SERPL-MCNC: 0.6 MG/DL (ref 0.5–1.05)
EGFRCR SERPLBLD CKD-EPI 2021: >90 ML/MIN/1.73M*2
EOSINOPHIL # BLD AUTO: 0.12 X10*3/UL (ref 0–0.4)
EOSINOPHIL NFR BLD AUTO: 3.6 %
ERYTHROCYTE [DISTWIDTH] IN BLOOD BY AUTOMATED COUNT: 12.7 % (ref 11.5–14.5)
GLUCOSE SERPL-MCNC: 111 MG/DL (ref 74–99)
HCT VFR BLD AUTO: 40.2 % (ref 36–46)
HGB BLD-MCNC: 13.8 G/DL (ref 12–16)
IMM GRANULOCYTES # BLD AUTO: 0 X10*3/UL (ref 0–0.5)
IMM GRANULOCYTES NFR BLD AUTO: 0 % (ref 0–0.9)
LYMPHOCYTES # BLD AUTO: 1.73 X10*3/UL (ref 0.8–3)
LYMPHOCYTES NFR BLD AUTO: 52.6 %
MCH RBC QN AUTO: 31 PG (ref 26–34)
MCHC RBC AUTO-ENTMCNC: 34.3 G/DL (ref 32–36)
MCV RBC AUTO: 90 FL (ref 80–100)
MONOCYTES # BLD AUTO: 0.5 X10*3/UL (ref 0.05–0.8)
MONOCYTES NFR BLD AUTO: 15.2 %
NEUTROPHILS # BLD AUTO: 0.91 X10*3/UL (ref 1.6–5.5)
NEUTROPHILS NFR BLD AUTO: 27.7 %
NRBC BLD-RTO: ABNORMAL /100{WBCS}
PLATELET # BLD AUTO: 157 X10*3/UL (ref 150–450)
POTASSIUM SERPL-SCNC: 3.6 MMOL/L (ref 3.5–5.3)
PROT SERPL-MCNC: 6.2 G/DL (ref 6.4–8.2)
RBC # BLD AUTO: 4.45 X10*6/UL (ref 4–5.2)
RBC MORPH BLD: NORMAL
SODIUM SERPL-SCNC: 141 MMOL/L (ref 136–145)
WBC # BLD AUTO: 3.3 X10*3/UL (ref 4.4–11.3)

## 2024-02-05 PROCEDURE — 99214 OFFICE O/P EST MOD 30 MIN: CPT | Performed by: INTERNAL MEDICINE

## 2024-02-05 PROCEDURE — 80053 COMPREHEN METABOLIC PANEL: CPT

## 2024-02-05 PROCEDURE — 85025 COMPLETE CBC W/AUTO DIFF WBC: CPT

## 2024-02-05 PROCEDURE — 96523 IRRIG DRUG DELIVERY DEVICE: CPT

## 2024-02-05 RX ORDER — PROCHLORPERAZINE MALEATE 10 MG
10 TABLET ORAL EVERY 6 HOURS PRN
Status: CANCELLED | OUTPATIENT
Start: 2024-02-12

## 2024-02-05 RX ORDER — DIPHENHYDRAMINE HYDROCHLORIDE 50 MG/ML
50 INJECTION INTRAMUSCULAR; INTRAVENOUS AS NEEDED
Status: CANCELLED | OUTPATIENT
Start: 2024-02-12

## 2024-02-05 RX ORDER — ALBUTEROL SULFATE 0.83 MG/ML
3 SOLUTION RESPIRATORY (INHALATION) AS NEEDED
Status: CANCELLED | OUTPATIENT
Start: 2024-02-12

## 2024-02-05 RX ORDER — PROCHLORPERAZINE EDISYLATE 5 MG/ML
10 INJECTION INTRAMUSCULAR; INTRAVENOUS EVERY 6 HOURS PRN
Status: CANCELLED | OUTPATIENT
Start: 2024-02-12

## 2024-02-05 RX ORDER — FAMOTIDINE 10 MG/ML
20 INJECTION INTRAVENOUS ONCE AS NEEDED
Status: CANCELLED | OUTPATIENT
Start: 2024-02-12

## 2024-02-05 RX ORDER — PALONOSETRON 0.05 MG/ML
0.25 INJECTION, SOLUTION INTRAVENOUS ONCE
Status: CANCELLED | OUTPATIENT
Start: 2024-02-12

## 2024-02-05 RX ORDER — EPINEPHRINE 0.3 MG/.3ML
0.3 INJECTION SUBCUTANEOUS EVERY 5 MIN PRN
Status: CANCELLED | OUTPATIENT
Start: 2024-02-12

## 2024-02-05 RX ORDER — FLUOROURACIL 50 MG/ML
400 INJECTION, SOLUTION INTRAVENOUS ONCE
Status: CANCELLED | OUTPATIENT
Start: 2024-02-12

## 2024-02-05 RX ORDER — LORAZEPAM 2 MG/ML
1 INJECTION INTRAMUSCULAR AS NEEDED
Status: CANCELLED | OUTPATIENT
Start: 2024-02-12

## 2024-02-05 ASSESSMENT — ENCOUNTER SYMPTOMS
LOSS OF SENSATION IN FEET: 0
OCCASIONAL FEELINGS OF UNSTEADINESS: 0
DEPRESSION: 0

## 2024-02-05 ASSESSMENT — PAIN SCALES - GENERAL
PAINLEVEL: 0-NO PAIN
PAINLEVEL: 0-NO PAIN

## 2024-02-05 ASSESSMENT — COLUMBIA-SUICIDE SEVERITY RATING SCALE - C-SSRS
6. HAVE YOU EVER DONE ANYTHING, STARTED TO DO ANYTHING, OR PREPARED TO DO ANYTHING TO END YOUR LIFE?: NO
1. IN THE PAST MONTH, HAVE YOU WISHED YOU WERE DEAD OR WISHED YOU COULD GO TO SLEEP AND NOT WAKE UP?: NO
2. HAVE YOU ACTUALLY HAD ANY THOUGHTS OF KILLING YOURSELF?: NO

## 2024-02-05 ASSESSMENT — PATIENT HEALTH QUESTIONNAIRE - PHQ9
SUM OF ALL RESPONSES TO PHQ9 QUESTIONS 1 AND 2: 0
1. LITTLE INTEREST OR PLEASURE IN DOING THINGS: NOT AT ALL
2. FEELING DOWN, DEPRESSED OR HOPELESS: NOT AT ALL

## 2024-02-05 NOTE — PROGRESS NOTES
Pt here for C2 folfox infusion. ANC count was 0.91 and did not meet parameters for treatment. Notified Dr Odom, will hold treatment today and delay to next week. Pt will return Tuesday 2/13 for treatment with labs and FUV the day before. Pt and sister are aware of plan of care, will call with further questions or concerns. Educated on infection risks.

## 2024-02-05 NOTE — PROGRESS NOTES
Patient ID: Marisabel Richardson is a 73 y.o. female.    Diagnoses:   1. 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  Proficient MMR.  2.  A large goiter with a history of hypothyroidism-currently on replacement dose of thyroxine.    Genomic profile: pMMR.    Assessment and Plan:  This is a pleasant woman who presented with a diagnosis of localized sigmoid colon adenocarcinoma.  She had a colonoscopy in October 2023 when she had abdominal pain.  Colonoscopy revealed multiple colonic polyps and a sigmoid colon mass.  Biopsy confirmed adenocarcinoma, proficient MMR.  Subsequently on November 15, 2023 she underwent colectomy by Dr. Lev Stanton.  She recovered well from the surgery.    We discussed the adjuvant treatment plan with her.  We recommended adjuvant chemotherapy based on the current NCCN guidelines since she has involvement of the lymph node.  We recommended FOLFOX for 6 cycles.  Discussed the chemotherapy regimen in detail. She had her mediport placed on 1/18/24.    Plan: FOLFOX (adjuvant) x 6 cycles.    Tolerated cycle 1 well except some nausea. Labs are pending today- will proceed with cycle 2 if labs are in acceptable range.  I told her to start Zofran on day 1 at night and continue 2 times a day for  3 days.    Follow-up: In 2 weeks for the next cycle.      I have placed all orders as outlined above. I advised the patient to schedule the tests and follow-up appointment as discussed by contacting the  on the way out or calling by phone.    Addendum on 2/5/2024 at 1:21 PM: I have been informed that her absolute neutrophil count is 0.91 today.  I deferred her chemotherapy by 1 week.    Providers:  Surgeon: Lev Brothers.  [facility]   [facility]:    Chief complaint:  Resected sigmoid colon adenocarcinoma.    HPI:  ONCOLOGIC HISTORY-  October 2, 2023: Had a colonoscopy in an outside facility when she presented with abdominal pain.   Colonoscopy showed a sigmoid mass.  Biopsy confirmed adenocarcinoma.  She also also had multiple polyps in her colon some of which have been removed.    November 9, 2023: She had a CT scan of chest abdomen pelvis which did not show any distant metastatic disease.  CT scan showed a large goiter.  She tells me that she has an endocrinologist who she follows up with.    November 15, 2023: She underwent colectomy.  Pathology confirmed pT3 pN1 adenocarcinoma without any involvement of the margins.    January 22, 2024: Started first cycle of chemotherapy with FOLFOX.    Interval history:  I have shared my name and active licensure. The patient’s identity and physical location were verified. Patient has been informed of the risks, benefits, and alternative of a remote visit. Patient consented to the remote visit.  Marisabel is due for cycle 2 today which she will receiving for age.  Apart from nausea for about 2 days after first cycle, she did not have any into trouble.  Denies any complaints today.  She reports overall she feels well and ready to receive treatment today. Denies any ongoing issues with fevers, chills, chest pain, SOB, N/V, diarrhea, constipation, urianry symptoms, skin rashes, or bleeding/bruising issues.     Review of systems: Negative unless otherwise stated in HPI     Past Medical History:   Past Medical History:  No date: Anxiety  No date: Colon polyp  No date: Depression  No date: HLD (hyperlipidemia)  No date: Hypothyroidism  No date: Mass of colon      Comment:  sigmoid colon   Surgical History:    Past Surgical History:   Procedure Laterality Date    CATARACT EXTRACTION Bilateral     COLONOSCOPY      HYSTERECTOMY        Family History:    Family History   Problem Relation Name Age of Onset    No Known Problems Mother      Cancer Father      Diabetes Father      Diabetes Sister      Diabetes Brother       Family Oncology History:    Cancer-related family history includes Cancer in her father.  Social  History:    Social History     Tobacco Use    Smoking status: Every Day     Packs/day: 1     Types: Cigarettes     Last attempt to quit: 2023     Years since quittin.2    Smokeless tobacco: Never   Vaping Use    Vaping Use: Never used   Substance Use Topics    Alcohol use: Never    Drug use: Never        Allergies  No Known Allergies     Medications  Current Outpatient Medications   Medication Instructions    amitriptyline (Elavil) 25 mg tablet 2 times daily    atorvastatin (LIPITOR) 20 mg, oral, Daily    hyoscyamine (ANASPAZ) 0.125 mg, oral, Every 4 hours PRN    levothyroxine (SYNTHROID, LEVOXYL) 25 mcg, oral    lidocaine-prilocaine (Emla) 2.5-2.5 % cream Apply to chest site 30-45 minutes prior to port being accessed for use    loperamide (Imodium) 2 mg capsule Take 2 capsules (4 mg) by mouth with the first episode of diarrhea and 1 capsule (2 mg) by mouth with any additional episodes. Maximum 8 capsules (16 mg) per day.    magnesium hydroxide (Milk of Magnesia) 400 mg/5 mL suspension oral, Daily PRN    multivitamin tablet 1 tablet, oral, Daily    ondansetron (ZOFRAN) 8 mg, oral, Every 8 hours PRN    prochlorperazine (COMPAZINE) 10 mg, oral, Every 6 hours PRN    sertraline (ZOLOFT) 100 mg, oral, Daily     Objective   VS: There were no vitals taken for this visit.  Weight:   There were no vitals filed for this visit.       PHYSICAL EXAMINATION      Dodie Odom MD.

## 2024-02-07 ENCOUNTER — APPOINTMENT (OUTPATIENT)
Dept: HEMATOLOGY/ONCOLOGY | Facility: CLINIC | Age: 74
End: 2024-02-07
Payer: MEDICARE

## 2024-02-12 ENCOUNTER — APPOINTMENT (OUTPATIENT)
Dept: HEMATOLOGY/ONCOLOGY | Facility: CLINIC | Age: 74
End: 2024-02-12
Payer: MEDICARE

## 2024-02-12 ENCOUNTER — LAB (OUTPATIENT)
Dept: LAB | Facility: HOSPITAL | Age: 74
End: 2024-02-12
Payer: MEDICARE

## 2024-02-12 ENCOUNTER — OFFICE VISIT (OUTPATIENT)
Dept: HEMATOLOGY/ONCOLOGY | Facility: CLINIC | Age: 74
End: 2024-02-12
Payer: MEDICARE

## 2024-02-12 VITALS
RESPIRATION RATE: 18 BRPM | HEART RATE: 106 BPM | SYSTOLIC BLOOD PRESSURE: 133 MMHG | DIASTOLIC BLOOD PRESSURE: 79 MMHG | TEMPERATURE: 97 F | WEIGHT: 124.45 LBS | OXYGEN SATURATION: 97 % | BODY MASS INDEX: 19.37 KG/M2

## 2024-02-12 DIAGNOSIS — C18.7 MALIGNANT NEOPLASM OF SIGMOID COLON (MULTI): ICD-10-CM

## 2024-02-12 DIAGNOSIS — C18.7 MALIGNANT NEOPLASM OF SIGMOID COLON (MULTI): Primary | ICD-10-CM

## 2024-02-12 LAB
ALBUMIN SERPL BCP-MCNC: 4 G/DL (ref 3.4–5)
ALP SERPL-CCNC: 81 U/L (ref 33–136)
ALT SERPL W P-5'-P-CCNC: 14 U/L (ref 7–45)
ANION GAP SERPL CALC-SCNC: 10 MMOL/L (ref 10–20)
AST SERPL W P-5'-P-CCNC: 16 U/L (ref 9–39)
BASOPHILS # BLD AUTO: 0.02 X10*3/UL (ref 0–0.1)
BASOPHILS NFR BLD AUTO: 0.5 %
BILIRUB SERPL-MCNC: 0.3 MG/DL (ref 0–1.2)
BUN SERPL-MCNC: 15 MG/DL (ref 6–23)
CALCIUM SERPL-MCNC: 9.3 MG/DL (ref 8.6–10.3)
CHLORIDE SERPL-SCNC: 105 MMOL/L (ref 98–107)
CO2 SERPL-SCNC: 28 MMOL/L (ref 21–32)
CREAT SERPL-MCNC: 0.69 MG/DL (ref 0.5–1.05)
EGFRCR SERPLBLD CKD-EPI 2021: >90 ML/MIN/1.73M*2
EOSINOPHIL # BLD AUTO: 0.1 X10*3/UL (ref 0–0.4)
EOSINOPHIL NFR BLD AUTO: 2.7 %
ERYTHROCYTE [DISTWIDTH] IN BLOOD BY AUTOMATED COUNT: 13.1 % (ref 11.5–14.5)
GLUCOSE SERPL-MCNC: 77 MG/DL (ref 74–99)
HCT VFR BLD AUTO: 42.7 % (ref 36–46)
HGB BLD-MCNC: 14.6 G/DL (ref 12–16)
IMM GRANULOCYTES # BLD AUTO: 0.01 X10*3/UL (ref 0–0.5)
IMM GRANULOCYTES NFR BLD AUTO: 0.3 % (ref 0–0.9)
LYMPHOCYTES # BLD AUTO: 1.57 X10*3/UL (ref 0.8–3)
LYMPHOCYTES NFR BLD AUTO: 43 %
MCH RBC QN AUTO: 31.3 PG (ref 26–34)
MCHC RBC AUTO-ENTMCNC: 34.2 G/DL (ref 32–36)
MCV RBC AUTO: 92 FL (ref 80–100)
MONOCYTES # BLD AUTO: 0.75 X10*3/UL (ref 0.05–0.8)
MONOCYTES NFR BLD AUTO: 20.5 %
NEUTROPHILS # BLD AUTO: 1.2 X10*3/UL (ref 1.6–5.5)
NEUTROPHILS NFR BLD AUTO: 33 %
PLATELET # BLD AUTO: 179 X10*3/UL (ref 150–450)
POTASSIUM SERPL-SCNC: 3.9 MMOL/L (ref 3.5–5.3)
PROT SERPL-MCNC: 6.6 G/DL (ref 6.4–8.2)
RBC # BLD AUTO: 4.66 X10*6/UL (ref 4–5.2)
SODIUM SERPL-SCNC: 139 MMOL/L (ref 136–145)
WBC # BLD AUTO: 3.7 X10*3/UL (ref 4.4–11.3)

## 2024-02-12 PROCEDURE — 1159F MED LIST DOCD IN RCRD: CPT

## 2024-02-12 PROCEDURE — 85025 COMPLETE CBC W/AUTO DIFF WBC: CPT

## 2024-02-12 PROCEDURE — 1126F AMNT PAIN NOTED NONE PRSNT: CPT

## 2024-02-12 PROCEDURE — 80053 COMPREHEN METABOLIC PANEL: CPT

## 2024-02-12 PROCEDURE — 99214 OFFICE O/P EST MOD 30 MIN: CPT

## 2024-02-12 ASSESSMENT — COLUMBIA-SUICIDE SEVERITY RATING SCALE - C-SSRS
2. HAVE YOU ACTUALLY HAD ANY THOUGHTS OF KILLING YOURSELF?: NO
1. IN THE PAST MONTH, HAVE YOU WISHED YOU WERE DEAD OR WISHED YOU COULD GO TO SLEEP AND NOT WAKE UP?: NO
6. HAVE YOU EVER DONE ANYTHING, STARTED TO DO ANYTHING, OR PREPARED TO DO ANYTHING TO END YOUR LIFE?: NO

## 2024-02-12 ASSESSMENT — ENCOUNTER SYMPTOMS
LOSS OF SENSATION IN FEET: 0
DEPRESSION: 0
OCCASIONAL FEELINGS OF UNSTEADINESS: 0

## 2024-02-12 NOTE — PROGRESS NOTES
Patient ID: Marisabel Richardson is a 73 y.o. female.    Diagnoses:   1. 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  Proficient MMR.  2.  A large goiter with a history of hypothyroidism-currently on replacement dose of thyroxine.    Genomic profile: pMMR.    Assessment and Plan:  This is a pleasant woman who presented with a diagnosis of localized sigmoid colon adenocarcinoma.  She had a colonoscopy in October 2023 when she had abdominal pain.  Colonoscopy revealed multiple colonic polyps and a sigmoid colon mass.  Biopsy confirmed adenocarcinoma, proficient MMR.  Subsequently on November 15, 2023 she underwent colectomy by Dr. Lev Stanton.  She recovered well from the surgery.    We discussed the adjuvant treatment plan with her.  We recommended adjuvant chemotherapy based on the current NCCN guidelines since she has involvement of the lymph node.  We recommended FOLFOX for 6 cycles.  Discussed the chemotherapy regimen in detail. She had her mediport placed on 1/18/24. Started cycle 1 on 1/22/24.    Plan: FOLFOX (adjuvant) x 6 cycles.    Tolerated cycle 1 well except some nausea. For future cycles, we did tell her  to start home antiemetics on day 1 at night and continue 2 times a day for  3 days. On 2/5, Cycle 2 was delayed  due to neutropenia.    She presents today 2/12/24 to re-try cycle 2 tomorrow 2/13/24. Overall she feels quite well. Labs reviewed and meet parameters. With cycle 2, we will remove 5- FU bolus dose for previous neutropenia issues and current decreased, but in parameters levels.     Follow-up: In 2 weeks for the next cycle.      I have placed all orders as outlined above. I advised the patient to schedule the tests and follow-up appointment as discussed by contacting the  on the way out or calling by phone.    Providers:  Surgeon: Lev Brothers.  [facility]: Dodie Odom   [facility]:    Chief complaint:  Resected sigmoid colon  adenocarcinoma.    HPI:  ONCOLOGIC HISTORY-  2023: Had a colonoscopy in an outside facility when she presented with abdominal pain.  Colonoscopy showed a sigmoid mass.  Biopsy confirmed adenocarcinoma.  She also also had multiple polyps in her colon some of which have been removed.    2023: She had a CT scan of chest abdomen pelvis which did not show any distant metastatic disease.  CT scan showed a large goiter.  She tells me that she has an endocrinologist who she follows up with.    November 15, 2023: She underwent colectomy.  Pathology confirmed pT3 pN1 adenocarcinoma without any involvement of the margins.    2024: Started first cycle of chemotherapy with FOLFOX.    Interval history:      Denies any ongoing issues with fevers, chills, chest pain, SOB, N/V, diarrhea, constipation, urianry symptoms, skin rashes, or bleeding/bruising issues.     Review of systems: Negative unless otherwise stated in HPI     Past Medical History:   Past Medical History:  No date: Anxiety  No date: Colon polyp  No date: Depression  No date: HLD (hyperlipidemia)  No date: Hypothyroidism  No date: Mass of colon      Comment:  sigmoid colon   Surgical History:    Past Surgical History:   Procedure Laterality Date    CATARACT EXTRACTION Bilateral     COLONOSCOPY      HYSTERECTOMY        Family History:    Family History   Problem Relation Name Age of Onset    No Known Problems Mother      Cancer Father      Diabetes Father      Diabetes Sister      Diabetes Brother       Family Oncology History:    Cancer-related family history includes Cancer in her father.  Social History:    Social History     Tobacco Use    Smoking status: Every Day     Packs/day: 1     Types: Cigarettes     Last attempt to quit: 2023     Years since quittin.2     Passive exposure: Never    Smokeless tobacco: Never   Vaping Use    Vaping Use: Never used   Substance Use Topics    Alcohol use: Never    Drug use: Never         Allergies  No Known Allergies     Medications  Current Outpatient Medications   Medication Instructions    amitriptyline (Elavil) 25 mg tablet 2 times daily    atorvastatin (LIPITOR) 20 mg, oral, Daily    hyoscyamine (ANASPAZ) 0.125 mg, oral, Every 4 hours PRN    levothyroxine (SYNTHROID, LEVOXYL) 25 mcg, oral    lidocaine-prilocaine (Emla) 2.5-2.5 % cream Apply to chest site 30-45 minutes prior to port being accessed for use    loperamide (Imodium) 2 mg capsule Take 2 capsules (4 mg) by mouth with the first episode of diarrhea and 1 capsule (2 mg) by mouth with any additional episodes. Maximum 8 capsules (16 mg) per day.    magnesium hydroxide (Milk of Magnesia) 400 mg/5 mL suspension oral, Daily PRN    multivitamin tablet 1 tablet, oral, Daily    ondansetron (ZOFRAN) 8 mg, oral, Every 8 hours PRN    prochlorperazine (COMPAZINE) 10 mg, oral, Every 6 hours PRN    sertraline (ZOLOFT) 100 mg, oral, Daily     Objective   VS: /79 (BP Location: Right arm, Patient Position: Sitting, BP Cuff Size: Adult)   Pulse 106   Temp 36.1 °C (97 °F) (Temporal)   Resp 18   Wt 56.4 kg (124 lb 7.2 oz)   SpO2 97%   BMI 19.37 kg/m²     PHYSICAL EXAMINATION    Constitutional: Awake/alert/oriented x3, cooperative and answers questions appropriately.      Eyes: No pallor, conjunctival injection, clear sclera.     Mouth: No ulceration. No thrush. Moist/ clear      Respiratory/Thorax: Normal breath sounds with bilaterally symmetrical chest expansion. No dullness.       Cardiovascular: No audible murmurs, normal heart sounds. Palpable pulses      Gastrointestinal: Nondistended, soft, non-tender, no rebound tenderness or guarding, no masses palpable, no organomegaly, +BS, no bruits. No ascites.      Musculoskeletal: No joint swelling, redness.       Extremities: normal extremities, no cyanosis edema, contusions or wounds, no clubbing.      Lymphatic: No significant lymphadenopathy.      Skin: Warm and dry, no lesions, no rashes.        Lyubov Hester APRN-CNP

## 2024-02-13 ENCOUNTER — INFUSION (OUTPATIENT)
Dept: HEMATOLOGY/ONCOLOGY | Facility: CLINIC | Age: 74
End: 2024-02-13
Payer: MEDICARE

## 2024-02-13 VITALS
SYSTOLIC BLOOD PRESSURE: 105 MMHG | RESPIRATION RATE: 16 BRPM | DIASTOLIC BLOOD PRESSURE: 62 MMHG | OXYGEN SATURATION: 97 % | WEIGHT: 125.2 LBS | HEART RATE: 99 BPM | BODY MASS INDEX: 19.65 KG/M2 | TEMPERATURE: 98.1 F | HEIGHT: 67 IN

## 2024-02-13 DIAGNOSIS — C18.7 MALIGNANT NEOPLASM OF SIGMOID COLON (MULTI): ICD-10-CM

## 2024-02-13 DIAGNOSIS — C18.9 MALIGNANT NEOPLASM OF COLON, UNSPECIFIED PART OF COLON (MULTI): ICD-10-CM

## 2024-02-13 PROCEDURE — 96368 THER/DIAG CONCURRENT INF: CPT

## 2024-02-13 PROCEDURE — 96415 CHEMO IV INFUSION ADDL HR: CPT

## 2024-02-13 PROCEDURE — 2500000004 HC RX 250 GENERAL PHARMACY W/ HCPCS (ALT 636 FOR OP/ED): Mod: JZ | Performed by: INTERNAL MEDICINE

## 2024-02-13 PROCEDURE — 96416 CHEMO PROLONG INFUSE W/PUMP: CPT

## 2024-02-13 PROCEDURE — 96366 THER/PROPH/DIAG IV INF ADDON: CPT | Mod: INF

## 2024-02-13 PROCEDURE — 2500000001 HC RX 250 WO HCPCS SELF ADMINISTERED DRUGS (ALT 637 FOR MEDICARE OP): Performed by: INTERNAL MEDICINE

## 2024-02-13 PROCEDURE — 96413 CHEMO IV INFUSION 1 HR: CPT

## 2024-02-13 PROCEDURE — 96375 TX/PRO/DX INJ NEW DRUG ADDON: CPT | Mod: INF

## 2024-02-13 PROCEDURE — 96367 TX/PROPH/DG ADDL SEQ IV INF: CPT

## 2024-02-13 PROCEDURE — 2500000004 HC RX 250 GENERAL PHARMACY W/ HCPCS (ALT 636 FOR OP/ED): Performed by: INTERNAL MEDICINE

## 2024-02-13 RX ORDER — HEPARIN SODIUM,PORCINE/PF 10 UNIT/ML
50 SYRINGE (ML) INTRAVENOUS AS NEEDED
Status: CANCELLED | OUTPATIENT
Start: 2024-02-13

## 2024-02-13 RX ORDER — FAMOTIDINE 10 MG/ML
20 INJECTION INTRAVENOUS ONCE AS NEEDED
Status: DISCONTINUED | OUTPATIENT
Start: 2024-02-13 | End: 2024-02-13 | Stop reason: HOSPADM

## 2024-02-13 RX ORDER — PROCHLORPERAZINE EDISYLATE 5 MG/ML
10 INJECTION INTRAMUSCULAR; INTRAVENOUS EVERY 6 HOURS PRN
Status: DISCONTINUED | OUTPATIENT
Start: 2024-02-13 | End: 2024-02-13 | Stop reason: HOSPADM

## 2024-02-13 RX ORDER — ALBUTEROL SULFATE 0.83 MG/ML
3 SOLUTION RESPIRATORY (INHALATION) AS NEEDED
Status: DISCONTINUED | OUTPATIENT
Start: 2024-02-13 | End: 2024-02-13 | Stop reason: HOSPADM

## 2024-02-13 RX ORDER — HEPARIN 100 UNIT/ML
500 SYRINGE INTRAVENOUS AS NEEDED
Status: CANCELLED | OUTPATIENT
Start: 2024-02-13

## 2024-02-13 RX ORDER — PROCHLORPERAZINE MALEATE 10 MG
10 TABLET ORAL EVERY 6 HOURS PRN
Status: DISCONTINUED | OUTPATIENT
Start: 2024-02-13 | End: 2024-02-13 | Stop reason: HOSPADM

## 2024-02-13 RX ORDER — EPINEPHRINE 0.3 MG/.3ML
0.3 INJECTION SUBCUTANEOUS EVERY 5 MIN PRN
Status: DISCONTINUED | OUTPATIENT
Start: 2024-02-13 | End: 2024-02-13 | Stop reason: HOSPADM

## 2024-02-13 RX ORDER — LORAZEPAM 2 MG/ML
1 INJECTION INTRAMUSCULAR AS NEEDED
Status: DISCONTINUED | OUTPATIENT
Start: 2024-02-13 | End: 2024-02-13 | Stop reason: HOSPADM

## 2024-02-13 RX ORDER — DIPHENHYDRAMINE HYDROCHLORIDE 50 MG/ML
50 INJECTION INTRAMUSCULAR; INTRAVENOUS AS NEEDED
Status: DISCONTINUED | OUTPATIENT
Start: 2024-02-13 | End: 2024-02-13 | Stop reason: HOSPADM

## 2024-02-13 RX ORDER — PALONOSETRON 0.05 MG/ML
0.25 INJECTION, SOLUTION INTRAVENOUS ONCE
Status: COMPLETED | OUTPATIENT
Start: 2024-02-13 | End: 2024-02-13

## 2024-02-13 RX ORDER — HEPARIN 100 UNIT/ML
500 SYRINGE INTRAVENOUS AS NEEDED
Status: DISCONTINUED | OUTPATIENT
Start: 2024-02-13 | End: 2024-02-13 | Stop reason: HOSPADM

## 2024-02-13 RX ADMIN — PROCHLORPERAZINE MALEATE 10 MG: 10 TABLET ORAL at 11:30

## 2024-02-13 RX ADMIN — PALONOSETRON HYDROCHLORIDE 250 MCG: 0.25 INJECTION INTRAVENOUS at 09:58

## 2024-02-13 RX ADMIN — OXALIPLATIN 145 MG: 5 INJECTION, SOLUTION, CONCENTRATE INTRAVENOUS at 10:43

## 2024-02-13 RX ADMIN — FLUOROURACIL 4150 MG: 50 INJECTION, SOLUTION INTRAVENOUS at 13:05

## 2024-02-13 RX ADMIN — DEXAMETHASONE SODIUM PHOSPHATE 12 MG: 4 INJECTION, SOLUTION INTRA-ARTICULAR; INTRALESIONAL; INTRAMUSCULAR; INTRAVENOUS; SOFT TISSUE at 09:58

## 2024-02-13 RX ADMIN — DEXTROSE MONOHYDRATE 680 MG: 50 INJECTION, SOLUTION INTRAVENOUS at 10:43

## 2024-02-13 ASSESSMENT — PAIN SCALES - GENERAL: PAINLEVEL: 0-NO PAIN

## 2024-02-13 NOTE — SIGNIFICANT EVENT
02/13/24 0924   Prechemo Checklist   Has the patient been in the hospital, ED, or urgent care since last date of service No   Chemo/Immuno Consent Signed Yes   Protocol/Indications Verified Yes   Confirmed to previous date/time of medication Yes  (Deferred one week due to counts)   Compared to previous dose Yes   All medications are dated accurately Yes   Pregnancy Test Negative Not applicable   Parameters Met Yes   BSA/Weight-Height Verified Yes   Dose Calculations Verified Yes

## 2024-02-13 NOTE — PROGRESS NOTES
Pt here for C2 of folfox. Pt tolerated infusion well. She is aware of plan of care, will call with further questions or concerns. Will return 2/15 for pump disconnect at 11:30

## 2024-02-15 ENCOUNTER — INFUSION (OUTPATIENT)
Dept: HEMATOLOGY/ONCOLOGY | Facility: CLINIC | Age: 74
End: 2024-02-15
Payer: MEDICARE

## 2024-02-15 VITALS
RESPIRATION RATE: 16 BRPM | HEIGHT: 67 IN | OXYGEN SATURATION: 97 % | TEMPERATURE: 98.1 F | SYSTOLIC BLOOD PRESSURE: 107 MMHG | DIASTOLIC BLOOD PRESSURE: 67 MMHG | BODY MASS INDEX: 19.49 KG/M2

## 2024-02-15 DIAGNOSIS — C18.9 MALIGNANT NEOPLASM OF COLON, UNSPECIFIED PART OF COLON (MULTI): ICD-10-CM

## 2024-02-15 DIAGNOSIS — C18.7 MALIGNANT NEOPLASM OF SIGMOID COLON (MULTI): ICD-10-CM

## 2024-02-15 PROCEDURE — 96523 IRRIG DRUG DELIVERY DEVICE: CPT

## 2024-02-15 PROCEDURE — 2500000004 HC RX 250 GENERAL PHARMACY W/ HCPCS (ALT 636 FOR OP/ED): Performed by: INTERNAL MEDICINE

## 2024-02-15 RX ORDER — HEPARIN SODIUM,PORCINE/PF 10 UNIT/ML
50 SYRINGE (ML) INTRAVENOUS AS NEEDED
Status: CANCELLED | OUTPATIENT
Start: 2024-02-15

## 2024-02-15 RX ORDER — HEPARIN 100 UNIT/ML
500 SYRINGE INTRAVENOUS AS NEEDED
Status: DISCONTINUED | OUTPATIENT
Start: 2024-02-15 | End: 2024-02-15 | Stop reason: HOSPADM

## 2024-02-15 RX ORDER — HEPARIN 100 UNIT/ML
500 SYRINGE INTRAVENOUS AS NEEDED
Status: CANCELLED | OUTPATIENT
Start: 2024-02-15

## 2024-02-15 RX ADMIN — Medication 500 UNITS: at 11:38

## 2024-02-15 ASSESSMENT — PAIN SCALES - GENERAL: PAINLEVEL: 0-NO PAIN

## 2024-02-15 NOTE — PROGRESS NOTES
Pt here for pump disconnect. Pt tolerated infusion well. She is aware of plan of care, will call with further questions or concerns will return in 2 weeks for next infusion

## 2024-02-19 ENCOUNTER — APPOINTMENT (OUTPATIENT)
Dept: HEMATOLOGY/ONCOLOGY | Facility: CLINIC | Age: 74
End: 2024-02-19
Payer: MEDICARE

## 2024-02-20 ENCOUNTER — APPOINTMENT (OUTPATIENT)
Dept: HEMATOLOGY/ONCOLOGY | Facility: CLINIC | Age: 74
End: 2024-02-20
Payer: MEDICARE

## 2024-02-21 ENCOUNTER — APPOINTMENT (OUTPATIENT)
Dept: HEMATOLOGY/ONCOLOGY | Facility: CLINIC | Age: 74
End: 2024-02-21
Payer: MEDICARE

## 2024-02-22 ENCOUNTER — APPOINTMENT (OUTPATIENT)
Dept: HEMATOLOGY/ONCOLOGY | Facility: CLINIC | Age: 74
End: 2024-02-22
Payer: MEDICARE

## 2024-02-26 ENCOUNTER — LAB (OUTPATIENT)
Dept: LAB | Facility: HOSPITAL | Age: 74
End: 2024-02-26
Payer: MEDICARE

## 2024-02-26 ENCOUNTER — TELEMEDICINE (OUTPATIENT)
Dept: HEMATOLOGY/ONCOLOGY | Facility: CLINIC | Age: 74
End: 2024-02-26
Payer: MEDICARE

## 2024-02-26 DIAGNOSIS — C18.7 MALIGNANT NEOPLASM OF SIGMOID COLON (MULTI): ICD-10-CM

## 2024-02-26 DIAGNOSIS — C18.7 MALIGNANT NEOPLASM OF SIGMOID COLON (MULTI): Primary | ICD-10-CM

## 2024-02-26 LAB
ALBUMIN SERPL BCP-MCNC: 4 G/DL (ref 3.4–5)
ALP SERPL-CCNC: 81 U/L (ref 33–136)
ALT SERPL W P-5'-P-CCNC: 30 U/L (ref 7–45)
ANION GAP SERPL CALC-SCNC: 10 MMOL/L (ref 10–20)
AST SERPL W P-5'-P-CCNC: 31 U/L (ref 9–39)
BASOPHILS # BLD AUTO: 0.03 X10*3/UL (ref 0–0.1)
BASOPHILS NFR BLD AUTO: 0.6 %
BILIRUB SERPL-MCNC: 0.3 MG/DL (ref 0–1.2)
BUN SERPL-MCNC: 15 MG/DL (ref 6–23)
CALCIUM SERPL-MCNC: 8.9 MG/DL (ref 8.6–10.3)
CHLORIDE SERPL-SCNC: 106 MMOL/L (ref 98–107)
CO2 SERPL-SCNC: 27 MMOL/L (ref 21–32)
CREAT SERPL-MCNC: 0.64 MG/DL (ref 0.5–1.05)
EGFRCR SERPLBLD CKD-EPI 2021: >90 ML/MIN/1.73M*2
EOSINOPHIL # BLD AUTO: 0.11 X10*3/UL (ref 0–0.4)
EOSINOPHIL NFR BLD AUTO: 2.2 %
ERYTHROCYTE [DISTWIDTH] IN BLOOD BY AUTOMATED COUNT: 13.5 % (ref 11.5–14.5)
GLUCOSE SERPL-MCNC: 95 MG/DL (ref 74–99)
HCT VFR BLD AUTO: 41.8 % (ref 36–46)
HGB BLD-MCNC: 14.1 G/DL (ref 12–16)
IMM GRANULOCYTES # BLD AUTO: 0 X10*3/UL (ref 0–0.5)
IMM GRANULOCYTES NFR BLD AUTO: 0 % (ref 0–0.9)
LYMPHOCYTES # BLD AUTO: 1.82 X10*3/UL (ref 0.8–3)
LYMPHOCYTES NFR BLD AUTO: 36.1 %
MCH RBC QN AUTO: 31.3 PG (ref 26–34)
MCHC RBC AUTO-ENTMCNC: 33.7 G/DL (ref 32–36)
MCV RBC AUTO: 93 FL (ref 80–100)
MONOCYTES # BLD AUTO: 0.79 X10*3/UL (ref 0.05–0.8)
MONOCYTES NFR BLD AUTO: 15.7 %
NEUTROPHILS # BLD AUTO: 2.29 X10*3/UL (ref 1.6–5.5)
NEUTROPHILS NFR BLD AUTO: 45.4 %
PLATELET # BLD AUTO: 152 X10*3/UL (ref 150–450)
POTASSIUM SERPL-SCNC: 3.7 MMOL/L (ref 3.5–5.3)
PROT SERPL-MCNC: 6.6 G/DL (ref 6.4–8.2)
RBC # BLD AUTO: 4.5 X10*6/UL (ref 4–5.2)
SODIUM SERPL-SCNC: 139 MMOL/L (ref 136–145)
WBC # BLD AUTO: 5 X10*3/UL (ref 4.4–11.3)

## 2024-02-26 PROCEDURE — 1159F MED LIST DOCD IN RCRD: CPT | Performed by: INTERNAL MEDICINE

## 2024-02-26 PROCEDURE — 85025 COMPLETE CBC W/AUTO DIFF WBC: CPT

## 2024-02-26 PROCEDURE — 1126F AMNT PAIN NOTED NONE PRSNT: CPT | Performed by: INTERNAL MEDICINE

## 2024-02-26 PROCEDURE — 80053 COMPREHEN METABOLIC PANEL: CPT

## 2024-02-26 PROCEDURE — 99214 OFFICE O/P EST MOD 30 MIN: CPT | Performed by: INTERNAL MEDICINE

## 2024-02-26 RX ORDER — ALBUTEROL SULFATE 0.83 MG/ML
3 SOLUTION RESPIRATORY (INHALATION) AS NEEDED
Status: CANCELLED | OUTPATIENT
Start: 2024-02-26

## 2024-02-26 RX ORDER — EPINEPHRINE 0.3 MG/.3ML
0.3 INJECTION SUBCUTANEOUS EVERY 5 MIN PRN
Status: CANCELLED | OUTPATIENT
Start: 2024-02-26

## 2024-02-26 RX ORDER — PALONOSETRON 0.05 MG/ML
0.25 INJECTION, SOLUTION INTRAVENOUS ONCE
Status: CANCELLED | OUTPATIENT
Start: 2024-02-26

## 2024-02-26 RX ORDER — DIPHENHYDRAMINE HYDROCHLORIDE 50 MG/ML
50 INJECTION INTRAMUSCULAR; INTRAVENOUS AS NEEDED
Status: CANCELLED | OUTPATIENT
Start: 2024-02-26

## 2024-02-26 RX ORDER — PROCHLORPERAZINE MALEATE 10 MG
10 TABLET ORAL EVERY 6 HOURS PRN
Status: CANCELLED | OUTPATIENT
Start: 2024-02-26

## 2024-02-26 RX ORDER — FAMOTIDINE 10 MG/ML
20 INJECTION INTRAVENOUS ONCE AS NEEDED
Status: CANCELLED | OUTPATIENT
Start: 2024-02-26

## 2024-02-26 RX ORDER — LORAZEPAM 2 MG/ML
1 INJECTION INTRAMUSCULAR AS NEEDED
Status: CANCELLED | OUTPATIENT
Start: 2024-02-26

## 2024-02-26 RX ORDER — PROCHLORPERAZINE EDISYLATE 5 MG/ML
10 INJECTION INTRAMUSCULAR; INTRAVENOUS EVERY 6 HOURS PRN
Status: CANCELLED | OUTPATIENT
Start: 2024-02-26

## 2024-02-26 NOTE — PROGRESS NOTES
Patient ID: Marisabel Richardson is a 73 y.o. female.    Diagnoses:   1. 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  Proficient MMR.  2.  A large goiter with a history of hypothyroidism-currently on replacement dose of thyroxine.    Genomic profile: pMMR.    Assessment and Plan:  This is a pleasant woman who presented with a diagnosis of localized sigmoid colon adenocarcinoma.  She had a colonoscopy in October 2023 when she had abdominal pain.  Colonoscopy revealed multiple colonic polyps and a sigmoid colon mass.  Biopsy confirmed adenocarcinoma, proficient MMR.  Subsequently on November 15, 2023 she underwent colectomy by Dr. Lev Stanton.  She recovered well from the surgery.    We discussed the adjuvant treatment plan with her.  We recommended adjuvant chemotherapy based on the current NCCN guidelines since she has involvement of the lymph node.  We recommended FOLFOX for 6 cycles.  Discussed the chemotherapy regimen in detail. She had her mediport placed on 1/18/24.    Plan: FOLFOX (adjuvant) x 6 cycles.    Tolerated previous cycle ok. Labs are pending - will proceed with cycle 3 tomorrow if labs are in acceptable range.  I told her to start Zofran on day 1 at night and continue 2 times a day for  3 days.    Follow-up: In 2 weeks for the next cycle.      I have placed all orders as outlined above. I advised the patient to schedule the tests and follow-up appointment as discussed by contacting the  on the way out or calling by phone.    Providers:  Surgeon: Lev Brothers.  [facility]   [facility]:    Chief complaint:  Resected sigmoid colon adenocarcinoma.    HPI:  ONCOLOGIC HISTORY-  October 2, 2023: Had a colonoscopy in an outside facility when she presented with abdominal pain.  Colonoscopy showed a sigmoid mass.  Biopsy confirmed adenocarcinoma.  She also also had multiple polyps in her colon some of which have been removed.    November 9,  : She had a CT scan of chest abdomen pelvis which did not show any distant metastatic disease.  CT scan showed a large goiter.  She tells me that she has an endocrinologist who she follows up with.    November 15, 2023: She underwent colectomy.  Pathology confirmed pT3 pN1 adenocarcinoma without any involvement of the margins.    2024: Started first cycle of chemotherapy with FOLFOX.    Interval history:  I have shared my name and active licensure. The patient’s identity and physical location were verified. Patient has been informed of the risks, benefits, and alternative of a remote visit. Patient consented to the remote visit.  Marisabel is due for cycle 3 tomorrow.  Apart from nausea for about 2 days after first cycle, she did not have any into trouble.  Denies any complaints today.  She reports overall she feels well and ready to receive treatment. Denies any ongoing issues with fevers, chills, chest pain, SOB, N/V, diarrhea, constipation, urianry symptoms, skin rashes, or bleeding/bruising issues.     Review of systems: Negative unless otherwise stated in HPI     Past Medical History:   Past Medical History:  No date: Anxiety  No date: Colon polyp  No date: Depression  No date: HLD (hyperlipidemia)  No date: Hypothyroidism  No date: Mass of colon      Comment:  sigmoid colon   Surgical History:    Past Surgical History:   Procedure Laterality Date    CATARACT EXTRACTION Bilateral     COLONOSCOPY      HYSTERECTOMY        Family History:    Family History   Problem Relation Name Age of Onset    No Known Problems Mother      Cancer Father      Diabetes Father      Diabetes Sister      Diabetes Brother       Family Oncology History:    Cancer-related family history includes Cancer in her father.  Social History:    Social History     Tobacco Use    Smoking status: Every Day     Packs/day: 1     Types: Cigarettes     Last attempt to quit: 2023     Years since quittin.3     Passive exposure: Never     Smokeless tobacco: Never   Vaping Use    Vaping Use: Never used   Substance Use Topics    Alcohol use: Never    Drug use: Never        Allergies  No Known Allergies     Medications  Current Outpatient Medications   Medication Instructions    amitriptyline (Elavil) 25 mg tablet 2 times daily    atorvastatin (LIPITOR) 20 mg, oral, Daily    hyoscyamine (ANASPAZ) 0.125 mg, oral, Every 4 hours PRN    levothyroxine (SYNTHROID, LEVOXYL) 25 mcg, oral    lidocaine-prilocaine (Emla) 2.5-2.5 % cream Apply to chest site 30-45 minutes prior to port being accessed for use    loperamide (Imodium) 2 mg capsule Take 2 capsules (4 mg) by mouth with the first episode of diarrhea and 1 capsule (2 mg) by mouth with any additional episodes. Maximum 8 capsules (16 mg) per day.    magnesium hydroxide (Milk of Magnesia) 400 mg/5 mL suspension oral, Daily PRN    multivitamin tablet 1 tablet, oral, Daily    ondansetron (ZOFRAN) 8 mg, oral, Every 8 hours PRN    prochlorperazine (COMPAZINE) 10 mg, oral, Every 6 hours PRN    sertraline (ZOLOFT) 100 mg, oral, Daily     Objective   VS: There were no vitals taken for this visit.  Weight:   There were no vitals filed for this visit.       PHYSICAL EXAMINATION      Dodie Odom MD.

## 2024-02-27 ENCOUNTER — INFUSION (OUTPATIENT)
Dept: HEMATOLOGY/ONCOLOGY | Facility: CLINIC | Age: 74
End: 2024-02-27
Payer: MEDICARE

## 2024-02-27 VITALS
OXYGEN SATURATION: 98 % | DIASTOLIC BLOOD PRESSURE: 78 MMHG | HEIGHT: 67 IN | HEART RATE: 111 BPM | RESPIRATION RATE: 16 BRPM | WEIGHT: 123.5 LBS | BODY MASS INDEX: 19.38 KG/M2 | TEMPERATURE: 98.1 F | SYSTOLIC BLOOD PRESSURE: 118 MMHG

## 2024-02-27 DIAGNOSIS — C18.9 MALIGNANT NEOPLASM OF COLON, UNSPECIFIED PART OF COLON (MULTI): ICD-10-CM

## 2024-02-27 DIAGNOSIS — C18.7 MALIGNANT NEOPLASM OF SIGMOID COLON (MULTI): ICD-10-CM

## 2024-02-27 PROCEDURE — 96413 CHEMO IV INFUSION 1 HR: CPT

## 2024-02-27 PROCEDURE — 96415 CHEMO IV INFUSION ADDL HR: CPT

## 2024-02-27 PROCEDURE — 96365 THER/PROPH/DIAG IV INF INIT: CPT | Mod: INF

## 2024-02-27 PROCEDURE — 96366 THER/PROPH/DIAG IV INF ADDON: CPT | Mod: INF

## 2024-02-27 PROCEDURE — 96411 CHEMO IV PUSH ADDL DRUG: CPT

## 2024-02-27 PROCEDURE — 2500000004 HC RX 250 GENERAL PHARMACY W/ HCPCS (ALT 636 FOR OP/ED): Performed by: INTERNAL MEDICINE

## 2024-02-27 PROCEDURE — 96368 THER/DIAG CONCURRENT INF: CPT

## 2024-02-27 PROCEDURE — 96375 TX/PRO/DX INJ NEW DRUG ADDON: CPT | Mod: INF

## 2024-02-27 RX ORDER — LORAZEPAM 2 MG/ML
1 INJECTION INTRAMUSCULAR AS NEEDED
Status: DISCONTINUED | OUTPATIENT
Start: 2024-02-27 | End: 2024-02-27 | Stop reason: HOSPADM

## 2024-02-27 RX ORDER — EPINEPHRINE 0.3 MG/.3ML
0.3 INJECTION SUBCUTANEOUS EVERY 5 MIN PRN
Status: DISCONTINUED | OUTPATIENT
Start: 2024-02-27 | End: 2024-02-27 | Stop reason: HOSPADM

## 2024-02-27 RX ORDER — PALONOSETRON 0.05 MG/ML
0.25 INJECTION, SOLUTION INTRAVENOUS ONCE
Status: COMPLETED | OUTPATIENT
Start: 2024-02-27 | End: 2024-02-27

## 2024-02-27 RX ORDER — PROCHLORPERAZINE EDISYLATE 5 MG/ML
10 INJECTION INTRAMUSCULAR; INTRAVENOUS EVERY 6 HOURS PRN
Status: DISCONTINUED | OUTPATIENT
Start: 2024-02-27 | End: 2024-02-27 | Stop reason: HOSPADM

## 2024-02-27 RX ORDER — PROCHLORPERAZINE MALEATE 10 MG
10 TABLET ORAL EVERY 6 HOURS PRN
Status: DISCONTINUED | OUTPATIENT
Start: 2024-02-27 | End: 2024-02-27 | Stop reason: HOSPADM

## 2024-02-27 RX ORDER — ALBUTEROL SULFATE 0.83 MG/ML
3 SOLUTION RESPIRATORY (INHALATION) AS NEEDED
Status: DISCONTINUED | OUTPATIENT
Start: 2024-02-27 | End: 2024-02-27 | Stop reason: HOSPADM

## 2024-02-27 RX ORDER — DIPHENHYDRAMINE HYDROCHLORIDE 50 MG/ML
50 INJECTION INTRAMUSCULAR; INTRAVENOUS AS NEEDED
Status: DISCONTINUED | OUTPATIENT
Start: 2024-02-27 | End: 2024-02-27 | Stop reason: HOSPADM

## 2024-02-27 RX ORDER — HEPARIN 100 UNIT/ML
500 SYRINGE INTRAVENOUS AS NEEDED
Status: CANCELLED | OUTPATIENT
Start: 2024-02-27

## 2024-02-27 RX ORDER — HEPARIN SODIUM,PORCINE/PF 10 UNIT/ML
50 SYRINGE (ML) INTRAVENOUS AS NEEDED
Status: CANCELLED | OUTPATIENT
Start: 2024-02-27

## 2024-02-27 RX ORDER — FAMOTIDINE 10 MG/ML
20 INJECTION INTRAVENOUS ONCE AS NEEDED
Status: DISCONTINUED | OUTPATIENT
Start: 2024-02-27 | End: 2024-02-27 | Stop reason: HOSPADM

## 2024-02-27 RX ADMIN — FLUOROURACIL 4150 MG: 50 INJECTION, SOLUTION INTRAVENOUS at 12:51

## 2024-02-27 RX ADMIN — PALONOSETRON HYDROCHLORIDE 250 MCG: 0.25 INJECTION INTRAVENOUS at 09:53

## 2024-02-27 RX ADMIN — DEXAMETHASONE SODIUM PHOSPHATE 12 MG: 4 INJECTION, SOLUTION INTRA-ARTICULAR; INTRALESIONAL; INTRAMUSCULAR; INTRAVENOUS; SOFT TISSUE at 09:53

## 2024-02-27 RX ADMIN — DEXTROSE MONOHYDRATE 680 MG: 50 INJECTION, SOLUTION INTRAVENOUS at 10:30

## 2024-02-27 RX ADMIN — OXALIPLATIN 145 MG: 5 INJECTION, SOLUTION INTRAVENOUS at 10:30

## 2024-02-27 ASSESSMENT — PAIN SCALES - GENERAL: PAINLEVEL: 0-NO PAIN

## 2024-02-27 NOTE — PROGRESS NOTES
Pt here for C3 folfox. Tolerated infusion. Well. She isaware of plan of care, will call with further questions or concerns. Will return 2/29 for pump disconnect

## 2024-02-27 NOTE — SIGNIFICANT EVENT
02/27/24 0924   Prechemo Checklist   Has the patient been in the hospital, ED, or urgent care since last date of service Yes   Chemo/Immuno Consent Signed Yes   Protocol/Indications Verified Yes   Confirmed to previous date/time of medication Yes   Compared to previous dose Yes   All medications are dated accurately Yes   Pregnancy Test Negative Not applicable   Parameters Met Yes   BSA/Weight-Height Verified Yes   Dose Calculations Verified Yes        Attending Statement: I have personally seen and examined this patient. I have fully participated in the care of this patient. I have reviewed all pertinent clinical information, including history physical exam, plan and the Resident's note and agree except as noted  77yo F  PMH of GERD, achalasia s/p esophagectomy in 2018 from home co sob and wheezing. Pt and family at bedside state "has had it (wheezing) for a long time" however last night " much worse"  not treated by a pulmonologist, not on meds: no steroids no neb no abx. Denies chest pain. Feels "cant breath" no ESTEVEZ orthopnea no cough no fever or chills. no abdominal pain. no vomit or diarrhea  non smoker. no leg swelling or calf pain. no travel. no runny nose or congestion.   Vital signs noted. pulse ox  RA sitting up. mmm. talking in full sentences. no retractions. Inspiratory wheeze greater than expiratory. no prolong exp phase. moving good air. normal S1-S2 thin female nt abdomen. no pedal edema. no calf tenderness. normal pulses bilateral feet.  plan labs, ekg, cxr, neb. steroids, peak flow,  re assess

## 2024-02-29 ENCOUNTER — INFUSION (OUTPATIENT)
Dept: HEMATOLOGY/ONCOLOGY | Facility: CLINIC | Age: 74
End: 2024-02-29
Payer: MEDICARE

## 2024-02-29 VITALS
OXYGEN SATURATION: 98 % | TEMPERATURE: 98.1 F | SYSTOLIC BLOOD PRESSURE: 101 MMHG | HEART RATE: 101 BPM | BODY MASS INDEX: 19.23 KG/M2 | HEIGHT: 67 IN | RESPIRATION RATE: 16 BRPM | DIASTOLIC BLOOD PRESSURE: 56 MMHG

## 2024-02-29 DIAGNOSIS — C18.9 MALIGNANT NEOPLASM OF COLON, UNSPECIFIED PART OF COLON (MULTI): ICD-10-CM

## 2024-02-29 DIAGNOSIS — C18.7 MALIGNANT NEOPLASM OF SIGMOID COLON (MULTI): ICD-10-CM

## 2024-02-29 PROCEDURE — 96523 IRRIG DRUG DELIVERY DEVICE: CPT

## 2024-02-29 PROCEDURE — 2500000004 HC RX 250 GENERAL PHARMACY W/ HCPCS (ALT 636 FOR OP/ED): Performed by: INTERNAL MEDICINE

## 2024-02-29 RX ORDER — HEPARIN SODIUM,PORCINE/PF 10 UNIT/ML
50 SYRINGE (ML) INTRAVENOUS AS NEEDED
Status: CANCELLED | OUTPATIENT
Start: 2024-02-29

## 2024-02-29 RX ORDER — HEPARIN 100 UNIT/ML
500 SYRINGE INTRAVENOUS AS NEEDED
Status: DISCONTINUED | OUTPATIENT
Start: 2024-02-29 | End: 2024-02-29 | Stop reason: HOSPADM

## 2024-02-29 RX ORDER — HEPARIN 100 UNIT/ML
500 SYRINGE INTRAVENOUS AS NEEDED
Status: CANCELLED | OUTPATIENT
Start: 2024-02-29

## 2024-02-29 RX ADMIN — Medication 500 UNITS: at 11:13

## 2024-02-29 ASSESSMENT — PAIN SCALES - GENERAL: PAINLEVEL: 0-NO PAIN

## 2024-02-29 NOTE — PROGRESS NOTES
"Pt arrived awake, alert, oriented, no apparent distress with unlabored breaths. Pt reports feeling well today without s/sx of illness. Pt does report that her tongue \"feels prickly\" and burned after eating a peppermint, denies open sores or swelling. She reports good PO intake with + urine output, and denied need for further assessment. Pt here for home infusion pump disconnect. Medi port site dressing dry and intact, site without s/sx of infection or infiltration at this time. Port flushed easily with + blood return, heparin flushed prior to de-accessing. Pt taking pump bag home- verbalizes understanding that she is to bring it back for future infusions. Pt without further questions or concerns, observed ambulating freely to exit area for discharge home in stable condition.    "

## 2024-03-11 ENCOUNTER — TELEMEDICINE (OUTPATIENT)
Dept: HEMATOLOGY/ONCOLOGY | Facility: CLINIC | Age: 74
End: 2024-03-11
Payer: MEDICARE

## 2024-03-11 ENCOUNTER — LAB (OUTPATIENT)
Dept: LAB | Facility: HOSPITAL | Age: 74
End: 2024-03-11
Payer: MEDICARE

## 2024-03-11 DIAGNOSIS — C18.7 MALIGNANT NEOPLASM OF SIGMOID COLON (MULTI): Primary | ICD-10-CM

## 2024-03-11 DIAGNOSIS — C18.7 MALIGNANT NEOPLASM OF SIGMOID COLON (MULTI): ICD-10-CM

## 2024-03-11 LAB
ALBUMIN SERPL BCP-MCNC: 4 G/DL (ref 3.4–5)
ALP SERPL-CCNC: 87 U/L (ref 33–136)
ALT SERPL W P-5'-P-CCNC: 42 U/L (ref 7–45)
ANION GAP SERPL CALC-SCNC: 10 MMOL/L (ref 10–20)
AST SERPL W P-5'-P-CCNC: 36 U/L (ref 9–39)
BASOPHILS # BLD AUTO: 0.03 X10*3/UL (ref 0–0.1)
BASOPHILS NFR BLD AUTO: 0.8 %
BILIRUB SERPL-MCNC: 0.5 MG/DL (ref 0–1.2)
BUN SERPL-MCNC: 15 MG/DL (ref 6–23)
CALCIUM SERPL-MCNC: 9.5 MG/DL (ref 8.6–10.3)
CHLORIDE SERPL-SCNC: 105 MMOL/L (ref 98–107)
CO2 SERPL-SCNC: 27 MMOL/L (ref 21–32)
CREAT SERPL-MCNC: 0.64 MG/DL (ref 0.5–1.05)
EGFRCR SERPLBLD CKD-EPI 2021: >90 ML/MIN/1.73M*2
EOSINOPHIL # BLD AUTO: 0.09 X10*3/UL (ref 0–0.4)
EOSINOPHIL NFR BLD AUTO: 2.4 %
ERYTHROCYTE [DISTWIDTH] IN BLOOD BY AUTOMATED COUNT: 14.4 % (ref 11.5–14.5)
GLUCOSE SERPL-MCNC: 95 MG/DL (ref 74–99)
HCT VFR BLD AUTO: 42.4 % (ref 36–46)
HGB BLD-MCNC: 14.3 G/DL (ref 12–16)
IMM GRANULOCYTES # BLD AUTO: 0 X10*3/UL (ref 0–0.5)
IMM GRANULOCYTES NFR BLD AUTO: 0 % (ref 0–0.9)
LYMPHOCYTES # BLD AUTO: 1.46 X10*3/UL (ref 0.8–3)
LYMPHOCYTES NFR BLD AUTO: 39.6 %
MCH RBC QN AUTO: 31.3 PG (ref 26–34)
MCHC RBC AUTO-ENTMCNC: 33.7 G/DL (ref 32–36)
MCV RBC AUTO: 93 FL (ref 80–100)
MONOCYTES # BLD AUTO: 0.65 X10*3/UL (ref 0.05–0.8)
MONOCYTES NFR BLD AUTO: 17.6 %
NEUTROPHILS # BLD AUTO: 1.46 X10*3/UL (ref 1.6–5.5)
NEUTROPHILS NFR BLD AUTO: 39.6 %
PLATELET # BLD AUTO: 105 X10*3/UL (ref 150–450)
POTASSIUM SERPL-SCNC: 4.1 MMOL/L (ref 3.5–5.3)
PROT SERPL-MCNC: 6.7 G/DL (ref 6.4–8.2)
RBC # BLD AUTO: 4.57 X10*6/UL (ref 4–5.2)
SODIUM SERPL-SCNC: 138 MMOL/L (ref 136–145)
WBC # BLD AUTO: 3.7 X10*3/UL (ref 4.4–11.3)

## 2024-03-11 PROCEDURE — 85025 COMPLETE CBC W/AUTO DIFF WBC: CPT

## 2024-03-11 PROCEDURE — 84075 ASSAY ALKALINE PHOSPHATASE: CPT

## 2024-03-11 PROCEDURE — 99443 PR PHYS/QHP TELEPHONE EVALUATION 21-30 MIN: CPT

## 2024-03-11 PROCEDURE — 1126F AMNT PAIN NOTED NONE PRSNT: CPT

## 2024-03-11 PROCEDURE — 1159F MED LIST DOCD IN RCRD: CPT

## 2024-03-11 RX ORDER — FAMOTIDINE 10 MG/ML
20 INJECTION INTRAVENOUS ONCE AS NEEDED
Status: CANCELLED | OUTPATIENT
Start: 2024-03-11

## 2024-03-11 RX ORDER — EPINEPHRINE 0.3 MG/.3ML
0.3 INJECTION SUBCUTANEOUS EVERY 5 MIN PRN
Status: CANCELLED | OUTPATIENT
Start: 2024-03-11

## 2024-03-11 RX ORDER — DIPHENHYDRAMINE HYDROCHLORIDE 50 MG/ML
50 INJECTION INTRAMUSCULAR; INTRAVENOUS AS NEEDED
Status: CANCELLED | OUTPATIENT
Start: 2024-03-11

## 2024-03-11 RX ORDER — PROCHLORPERAZINE EDISYLATE 5 MG/ML
10 INJECTION INTRAMUSCULAR; INTRAVENOUS EVERY 6 HOURS PRN
Status: CANCELLED | OUTPATIENT
Start: 2024-03-11

## 2024-03-11 RX ORDER — LORAZEPAM 2 MG/ML
1 INJECTION INTRAMUSCULAR AS NEEDED
Status: CANCELLED | OUTPATIENT
Start: 2024-03-11

## 2024-03-11 RX ORDER — PROCHLORPERAZINE MALEATE 10 MG
10 TABLET ORAL EVERY 6 HOURS PRN
Status: CANCELLED | OUTPATIENT
Start: 2024-03-11

## 2024-03-11 RX ORDER — ALBUTEROL SULFATE 0.83 MG/ML
3 SOLUTION RESPIRATORY (INHALATION) AS NEEDED
Status: CANCELLED | OUTPATIENT
Start: 2024-03-11

## 2024-03-11 RX ORDER — PALONOSETRON 0.05 MG/ML
0.25 INJECTION, SOLUTION INTRAVENOUS ONCE
Status: CANCELLED | OUTPATIENT
Start: 2024-03-11

## 2024-03-11 NOTE — PROGRESS NOTES
Patient ID: Marisabel Richardson is a 74 y.o. female.    Diagnoses:   1. 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  Proficient MMR.  2.  A large goiter with a history of hypothyroidism-currently on replacement dose of thyroxine.    Genomic profile: pMMR.    Assessment and Plan:  This is a pleasant woman who presented with a diagnosis of localized sigmoid colon adenocarcinoma.  She had a colonoscopy in October 2023 when she had abdominal pain.  Colonoscopy revealed multiple colonic polyps and a sigmoid colon mass.  Biopsy confirmed adenocarcinoma, proficient MMR.  Subsequently on November 15, 2023 she underwent colectomy by Dr. Lev Stanton.  She recovered well from the surgery.    We discussed the adjuvant treatment plan with her.  We recommended adjuvant chemotherapy based on the current NCCN guidelines since she has involvement of the lymph node.  We recommended FOLFOX for 6 cycles.  Discussed the chemotherapy regimen in detail. She had her mediport placed on 1/18/24.    Plan: FOLFOX (adjuvant) x 6 cycles.    Tolerated previous cycle fairly well. Labs reviewed and in parameters. We will proceed with cycle 4 as planned tomorrow. We will trial IVFs with treatment and pump disconnect as needed. Patient feels that this may help with fatigue as the first few days following treatment she notes her PO intake does decrease slightly.     Follow-up: In 2 weeks for the next cycle.      I have placed all orders as outlined above. I advised the patient to schedule the tests and follow-up appointment as discussed by contacting the  on the way out or calling by phone.    Providers:  Surgeon: Lev Brothers.  [facility]: Dodie Odom   [facility]:    Chief complaint:  Resected sigmoid colon adenocarcinoma.    HPI:  ONCOLOGIC HISTORY-  October 2, 2023: Had a colonoscopy in an outside facility when she presented with abdominal pain.  Colonoscopy showed a sigmoid mass.  Biopsy  confirmed adenocarcinoma.  She also also had multiple polyps in her colon some of which have been removed.    November 9, 2023: She had a CT scan of chest abdomen pelvis which did not show any distant metastatic disease.  CT scan showed a large goiter.  She tells me that she has an endocrinologist who she follows up with.    November 15, 2023: She underwent colectomy.  Pathology confirmed pT3 pN1 adenocarcinoma without any involvement of the margins.    January 22, 2024: Started first cycle of chemotherapy with FOLFOX.    Interval history:    I have shared my name and active licensure. The patient’s identity and physical location were verified. Patient has been informed of the risks, benefits, and alternative of a remote visit. Patient consented to the remote visit.    Marisabel is due for cycle 4 tomorrow. Does report she notices increased fatigue but still reports fatigue as manageable, just resting more. Denies any other new complaints today. Appetite is decent. Denies any ongoing issues with fevers, chills, chest pain, SOB, N/V, diarrhea, constipation, urianry symptoms, skin rashes, or bleeding/bruising issues.     Review of systems: Negative unless otherwise stated in HPI     Past Medical History:   Past Medical History:  No date: Anxiety  No date: Colon polyp  No date: Depression  No date: HLD (hyperlipidemia)  No date: Hypothyroidism  No date: Mass of colon      Comment:  sigmoid colon   Surgical History:    Past Surgical History:   Procedure Laterality Date    CATARACT EXTRACTION Bilateral     COLONOSCOPY      HYSTERECTOMY        Family History:    Family History   Problem Relation Name Age of Onset    No Known Problems Mother      Cancer Father      Diabetes Father      Diabetes Sister      Diabetes Brother       Family Oncology History:    Cancer-related family history includes Cancer in her father.  Social History:    Social History     Tobacco Use    Smoking status: Every Day     Packs/day: 1     Types:  Cigarettes     Last attempt to quit: 2023     Years since quittin.3     Passive exposure: Never    Smokeless tobacco: Never   Vaping Use    Vaping Use: Never used   Substance Use Topics    Alcohol use: Never    Drug use: Never        Allergies  No Known Allergies     Medications  Current Outpatient Medications   Medication Instructions    amitriptyline (Elavil) 25 mg tablet 2 times daily    atorvastatin (LIPITOR) 20 mg, oral, Daily    hyoscyamine (ANASPAZ) 0.125 mg, oral, Every 4 hours PRN    levothyroxine (SYNTHROID, LEVOXYL) 25 mcg, oral    lidocaine-prilocaine (Emla) 2.5-2.5 % cream Apply to chest site 30-45 minutes prior to port being accessed for use    loperamide (Imodium) 2 mg capsule Take 2 capsules (4 mg) by mouth with the first episode of diarrhea and 1 capsule (2 mg) by mouth with any additional episodes. Maximum 8 capsules (16 mg) per day.    magnesium hydroxide (Milk of Magnesia) 400 mg/5 mL suspension oral, Daily PRN    multivitamin tablet 1 tablet, oral, Daily    ondansetron (ZOFRAN) 8 mg, oral, Every 8 hours PRN    prochlorperazine (COMPAZINE) 10 mg, oral, Every 6 hours PRN    sertraline (ZOLOFT) 100 mg, oral, Daily     Objective   VS: There were no vitals taken for this visit.  Weight:   There were no vitals filed for this visit.       PHYSICAL EXAMINATION--- visit done via telephone     Lyubov Hester APRN-CNP

## 2024-03-12 ENCOUNTER — INFUSION (OUTPATIENT)
Dept: HEMATOLOGY/ONCOLOGY | Facility: CLINIC | Age: 74
End: 2024-03-12
Payer: MEDICARE

## 2024-03-12 ENCOUNTER — SOCIAL WORK (OUTPATIENT)
Dept: CASE MANAGEMENT | Facility: HOSPITAL | Age: 74
End: 2024-03-12

## 2024-03-12 VITALS
SYSTOLIC BLOOD PRESSURE: 128 MMHG | HEIGHT: 67 IN | BODY MASS INDEX: 19.09 KG/M2 | WEIGHT: 121.6 LBS | HEART RATE: 108 BPM | DIASTOLIC BLOOD PRESSURE: 78 MMHG | RESPIRATION RATE: 16 BRPM | OXYGEN SATURATION: 99 % | TEMPERATURE: 98.2 F

## 2024-03-12 DIAGNOSIS — C18.7 MALIGNANT NEOPLASM OF SIGMOID COLON (MULTI): ICD-10-CM

## 2024-03-12 PROCEDURE — 2500000004 HC RX 250 GENERAL PHARMACY W/ HCPCS (ALT 636 FOR OP/ED)

## 2024-03-12 PROCEDURE — 96411 CHEMO IV PUSH ADDL DRUG: CPT

## 2024-03-12 PROCEDURE — 96361 HYDRATE IV INFUSION ADD-ON: CPT | Mod: INF

## 2024-03-12 PROCEDURE — 2500000001 HC RX 250 WO HCPCS SELF ADMINISTERED DRUGS (ALT 637 FOR MEDICARE OP)

## 2024-03-12 PROCEDURE — 96366 THER/PROPH/DIAG IV INF ADDON: CPT | Mod: INF

## 2024-03-12 PROCEDURE — 96367 TX/PROPH/DG ADDL SEQ IV INF: CPT | Mod: INF

## 2024-03-12 PROCEDURE — 96413 CHEMO IV INFUSION 1 HR: CPT

## 2024-03-12 PROCEDURE — 96375 TX/PRO/DX INJ NEW DRUG ADDON: CPT | Mod: INF

## 2024-03-12 PROCEDURE — 96415 CHEMO IV INFUSION ADDL HR: CPT

## 2024-03-12 PROCEDURE — 96368 THER/DIAG CONCURRENT INF: CPT

## 2024-03-12 RX ORDER — FAMOTIDINE 10 MG/ML
20 INJECTION INTRAVENOUS ONCE AS NEEDED
Status: DISCONTINUED | OUTPATIENT
Start: 2024-03-12 | End: 2024-03-12 | Stop reason: HOSPADM

## 2024-03-12 RX ORDER — PROCHLORPERAZINE MALEATE 10 MG
10 TABLET ORAL EVERY 6 HOURS PRN
Status: DISCONTINUED | OUTPATIENT
Start: 2024-03-12 | End: 2024-03-12 | Stop reason: HOSPADM

## 2024-03-12 RX ORDER — PALONOSETRON 0.05 MG/ML
0.25 INJECTION, SOLUTION INTRAVENOUS ONCE
Status: COMPLETED | OUTPATIENT
Start: 2024-03-12 | End: 2024-03-12

## 2024-03-12 RX ORDER — ALBUTEROL SULFATE 0.83 MG/ML
3 SOLUTION RESPIRATORY (INHALATION) AS NEEDED
Status: DISCONTINUED | OUTPATIENT
Start: 2024-03-12 | End: 2024-03-12 | Stop reason: HOSPADM

## 2024-03-12 RX ORDER — LORAZEPAM 2 MG/ML
1 INJECTION INTRAMUSCULAR AS NEEDED
Status: DISCONTINUED | OUTPATIENT
Start: 2024-03-12 | End: 2024-03-12 | Stop reason: HOSPADM

## 2024-03-12 RX ORDER — PROCHLORPERAZINE EDISYLATE 5 MG/ML
10 INJECTION INTRAMUSCULAR; INTRAVENOUS EVERY 6 HOURS PRN
Status: DISCONTINUED | OUTPATIENT
Start: 2024-03-12 | End: 2024-03-12 | Stop reason: HOSPADM

## 2024-03-12 RX ORDER — EPINEPHRINE 0.3 MG/.3ML
0.3 INJECTION SUBCUTANEOUS EVERY 5 MIN PRN
Status: DISCONTINUED | OUTPATIENT
Start: 2024-03-12 | End: 2024-03-12 | Stop reason: HOSPADM

## 2024-03-12 RX ORDER — DIPHENHYDRAMINE HYDROCHLORIDE 50 MG/ML
50 INJECTION INTRAMUSCULAR; INTRAVENOUS AS NEEDED
Status: DISCONTINUED | OUTPATIENT
Start: 2024-03-12 | End: 2024-03-12 | Stop reason: HOSPADM

## 2024-03-12 RX ADMIN — PALONOSETRON HYDROCHLORIDE 250 MCG: 0.25 INJECTION INTRAVENOUS at 10:39

## 2024-03-12 RX ADMIN — PROCHLORPERAZINE MALEATE 10 MG: 10 TABLET ORAL at 13:20

## 2024-03-12 RX ADMIN — SODIUM CHLORIDE 1000 ML: 9 INJECTION, SOLUTION INTRAVENOUS at 10:00

## 2024-03-12 RX ADMIN — LEUCOVORIN CALCIUM 680 MG: 350 INJECTION, POWDER, LYOPHILIZED, FOR SUSPENSION INTRAMUSCULAR; INTRAVENOUS at 11:07

## 2024-03-12 RX ADMIN — DEXAMETHASONE SODIUM PHOSPHATE 12 MG: 4 INJECTION, SOLUTION INTRA-ARTICULAR; INTRALESIONAL; INTRAMUSCULAR; INTRAVENOUS; SOFT TISSUE at 10:39

## 2024-03-12 RX ADMIN — OXALIPLATIN 145 MG: 5 INJECTION, SOLUTION INTRAVENOUS at 11:13

## 2024-03-12 RX ADMIN — FLUOROURACIL 4150 MG: 50 INJECTION, SOLUTION INTRAVENOUS at 13:38

## 2024-03-12 ASSESSMENT — PAIN SCALES - GENERAL: PAINLEVEL: 0-NO PAIN

## 2024-03-12 NOTE — PROGRESS NOTES
Patient is here for cycle 4 day 1 of folfox. IV hydration was added to regimen. Patient tolerated infusion well, no questions or concerns at this time. Patient will return 3/14 at 1130 for pump disconnect and iv hydration. Discharged in stable condition.

## 2024-03-12 NOTE — PROGRESS NOTES
(SW) reviewed chart and met with patient and her sister today for introduction, to assess needs, and offer support.  Patient stated that she did not need any SW services.  Patient's sister asked what services SW could offer.  SW provided a SW list and her sister reviewed.  She then stated that patient really did need her Advanced Directives.  Patient's sister stated lets feel them out.  Patient stated that she will work on at home.  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  SW provided patient with SW phone number if she would have any questions or if any needs arise.      Yevgeniy Judge MSW, LSW

## 2024-03-12 NOTE — SIGNIFICANT EVENT
03/12/24 0959   Prechemo Checklist   Has the patient been in the hospital, ED, or urgent care since last date of service No   Chemo/Immuno Consent Signed Yes   Protocol/Indications Verified Yes   Confirmed to previous date/time of medication Yes   Compared to previous dose Yes   All medications are dated accurately Yes   Pregnancy Test Negative Not applicable   Parameters Met Yes   BSA/Weight-Height Verified Yes   Dose Calculations Verified Yes

## 2024-03-14 ENCOUNTER — INFUSION (OUTPATIENT)
Dept: HEMATOLOGY/ONCOLOGY | Facility: CLINIC | Age: 74
End: 2024-03-14
Payer: MEDICARE

## 2024-03-14 VITALS
HEART RATE: 63 BPM | TEMPERATURE: 98.1 F | WEIGHT: 121.69 LBS | BODY MASS INDEX: 19.1 KG/M2 | OXYGEN SATURATION: 99 % | RESPIRATION RATE: 16 BRPM | SYSTOLIC BLOOD PRESSURE: 120 MMHG | HEIGHT: 67 IN | DIASTOLIC BLOOD PRESSURE: 67 MMHG

## 2024-03-14 DIAGNOSIS — C18.9 MALIGNANT NEOPLASM OF COLON, UNSPECIFIED PART OF COLON (MULTI): ICD-10-CM

## 2024-03-14 DIAGNOSIS — C18.7 MALIGNANT NEOPLASM OF SIGMOID COLON (MULTI): ICD-10-CM

## 2024-03-14 PROCEDURE — 2500000004 HC RX 250 GENERAL PHARMACY W/ HCPCS (ALT 636 FOR OP/ED): Performed by: INTERNAL MEDICINE

## 2024-03-14 PROCEDURE — 96360 HYDRATION IV INFUSION INIT: CPT | Mod: INF

## 2024-03-14 PROCEDURE — 2500000004 HC RX 250 GENERAL PHARMACY W/ HCPCS (ALT 636 FOR OP/ED)

## 2024-03-14 RX ORDER — HEPARIN 100 UNIT/ML
500 SYRINGE INTRAVENOUS AS NEEDED
Status: CANCELLED | OUTPATIENT
Start: 2024-03-14

## 2024-03-14 RX ORDER — HEPARIN 100 UNIT/ML
500 SYRINGE INTRAVENOUS AS NEEDED
Status: DISCONTINUED | OUTPATIENT
Start: 2024-03-14 | End: 2024-03-14 | Stop reason: HOSPADM

## 2024-03-14 RX ORDER — HEPARIN SODIUM,PORCINE/PF 10 UNIT/ML
50 SYRINGE (ML) INTRAVENOUS AS NEEDED
Status: CANCELLED | OUTPATIENT
Start: 2024-03-14

## 2024-03-14 RX ADMIN — Medication 500 UNITS: at 11:46

## 2024-03-14 RX ADMIN — SODIUM CHLORIDE 1000 ML: 9 INJECTION, SOLUTION INTRAVENOUS at 11:44

## 2024-03-14 ASSESSMENT — PAIN SCALES - GENERAL: PAINLEVEL: 0-NO PAIN

## 2024-03-14 NOTE — PROGRESS NOTES
Pt here for pump disconnect and IVF. Tolerated well. She is aware of plan of care, will call with further questions or concerns. Will return in 2 weks for next infusion.

## 2024-03-25 ENCOUNTER — TELEMEDICINE (OUTPATIENT)
Dept: HEMATOLOGY/ONCOLOGY | Facility: CLINIC | Age: 74
End: 2024-03-25
Payer: MEDICARE

## 2024-03-25 ENCOUNTER — LAB (OUTPATIENT)
Dept: LAB | Facility: HOSPITAL | Age: 74
End: 2024-03-25
Payer: MEDICARE

## 2024-03-25 DIAGNOSIS — C18.7 MALIGNANT NEOPLASM OF SIGMOID COLON (MULTI): ICD-10-CM

## 2024-03-25 LAB
ALBUMIN SERPL BCP-MCNC: 3.8 G/DL (ref 3.4–5)
ALP SERPL-CCNC: 92 U/L (ref 33–136)
ALT SERPL W P-5'-P-CCNC: 20 U/L (ref 7–45)
ANION GAP SERPL CALC-SCNC: 10 MMOL/L (ref 10–20)
AST SERPL W P-5'-P-CCNC: 25 U/L (ref 9–39)
BASOPHILS # BLD AUTO: 0.02 X10*3/UL (ref 0–0.1)
BASOPHILS NFR BLD AUTO: 0.6 %
BILIRUB SERPL-MCNC: 0.5 MG/DL (ref 0–1.2)
BUN SERPL-MCNC: 11 MG/DL (ref 6–23)
CALCIUM SERPL-MCNC: 9.2 MG/DL (ref 8.6–10.3)
CHLORIDE SERPL-SCNC: 105 MMOL/L (ref 98–107)
CO2 SERPL-SCNC: 26 MMOL/L (ref 21–32)
CREAT SERPL-MCNC: 0.63 MG/DL (ref 0.5–1.05)
EGFRCR SERPLBLD CKD-EPI 2021: >90 ML/MIN/1.73M*2
EOSINOPHIL # BLD AUTO: 0.08 X10*3/UL (ref 0–0.4)
EOSINOPHIL NFR BLD AUTO: 2.5 %
ERYTHROCYTE [DISTWIDTH] IN BLOOD BY AUTOMATED COUNT: 14.5 % (ref 11.5–14.5)
GLUCOSE SERPL-MCNC: 99 MG/DL (ref 74–99)
HCT VFR BLD AUTO: 41.8 % (ref 36–46)
HGB BLD-MCNC: 14.4 G/DL (ref 12–16)
IMM GRANULOCYTES # BLD AUTO: 0 X10*3/UL (ref 0–0.5)
IMM GRANULOCYTES NFR BLD AUTO: 0 % (ref 0–0.9)
LYMPHOCYTES # BLD AUTO: 1.29 X10*3/UL (ref 0.8–3)
LYMPHOCYTES NFR BLD AUTO: 41 %
MCH RBC QN AUTO: 32 PG (ref 26–34)
MCHC RBC AUTO-ENTMCNC: 34.4 G/DL (ref 32–36)
MCV RBC AUTO: 93 FL (ref 80–100)
MONOCYTES # BLD AUTO: 0.54 X10*3/UL (ref 0.05–0.8)
MONOCYTES NFR BLD AUTO: 17.1 %
NEUTROPHILS # BLD AUTO: 1.22 X10*3/UL (ref 1.6–5.5)
NEUTROPHILS NFR BLD AUTO: 38.8 %
NRBC BLD-RTO: ABNORMAL /100{WBCS}
PLATELET # BLD AUTO: 93 X10*3/UL (ref 150–450)
POTASSIUM SERPL-SCNC: 3.9 MMOL/L (ref 3.5–5.3)
PROT SERPL-MCNC: 6.6 G/DL (ref 6.4–8.2)
RBC # BLD AUTO: 4.5 X10*6/UL (ref 4–5.2)
RBC MORPH BLD: NORMAL
SODIUM SERPL-SCNC: 137 MMOL/L (ref 136–145)
WBC # BLD AUTO: 3.2 X10*3/UL (ref 4.4–11.3)

## 2024-03-25 PROCEDURE — 85025 COMPLETE CBC W/AUTO DIFF WBC: CPT

## 2024-03-25 PROCEDURE — 99443 PR PHYS/QHP TELEPHONE EVALUATION 21-30 MIN: CPT

## 2024-03-25 PROCEDURE — 1160F RVW MEDS BY RX/DR IN RCRD: CPT

## 2024-03-25 PROCEDURE — 1159F MED LIST DOCD IN RCRD: CPT

## 2024-03-25 PROCEDURE — 80053 COMPREHEN METABOLIC PANEL: CPT

## 2024-03-25 PROCEDURE — 1126F AMNT PAIN NOTED NONE PRSNT: CPT

## 2024-03-25 RX ORDER — PROCHLORPERAZINE MALEATE 10 MG
10 TABLET ORAL EVERY 6 HOURS PRN
Status: CANCELLED | OUTPATIENT
Start: 2024-03-25

## 2024-03-25 RX ORDER — EPINEPHRINE 0.3 MG/.3ML
0.3 INJECTION SUBCUTANEOUS EVERY 5 MIN PRN
Status: CANCELLED | OUTPATIENT
Start: 2024-03-25

## 2024-03-25 RX ORDER — FAMOTIDINE 10 MG/ML
20 INJECTION INTRAVENOUS ONCE AS NEEDED
Status: CANCELLED | OUTPATIENT
Start: 2024-03-25

## 2024-03-25 RX ORDER — DIPHENHYDRAMINE HYDROCHLORIDE 50 MG/ML
50 INJECTION INTRAMUSCULAR; INTRAVENOUS AS NEEDED
Status: CANCELLED | OUTPATIENT
Start: 2024-03-25

## 2024-03-25 RX ORDER — LORAZEPAM 2 MG/ML
1 INJECTION INTRAMUSCULAR AS NEEDED
Status: CANCELLED | OUTPATIENT
Start: 2024-03-25

## 2024-03-25 RX ORDER — PROCHLORPERAZINE EDISYLATE 5 MG/ML
10 INJECTION INTRAMUSCULAR; INTRAVENOUS EVERY 6 HOURS PRN
Status: CANCELLED | OUTPATIENT
Start: 2024-03-25

## 2024-03-25 RX ORDER — ALBUTEROL SULFATE 0.83 MG/ML
3 SOLUTION RESPIRATORY (INHALATION) AS NEEDED
Status: CANCELLED | OUTPATIENT
Start: 2024-03-25

## 2024-03-25 RX ORDER — PALONOSETRON 0.05 MG/ML
0.25 INJECTION, SOLUTION INTRAVENOUS ONCE
Status: CANCELLED | OUTPATIENT
Start: 2024-03-25

## 2024-03-25 ASSESSMENT — PAIN SCALES - GENERAL: PAINLEVEL: 0-NO PAIN

## 2024-03-25 NOTE — PROGRESS NOTES
Patient ID: Marisabel Richardson is a 74 y.o. female.    Diagnoses:   1. 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  Proficient MMR.  2.  A large goiter with a history of hypothyroidism-currently on replacement dose of thyroxine.    Genomic profile: pMMR.    Assessment and Plan:  This is a pleasant woman who presented with a diagnosis of localized sigmoid colon adenocarcinoma.  She had a colonoscopy in October 2023 when she had abdominal pain.  Colonoscopy revealed multiple colonic polyps and a sigmoid colon mass.  Biopsy confirmed adenocarcinoma, proficient MMR.  Subsequently on November 15, 2023 she underwent colectomy by Dr. Lev Stanton.  She recovered well from the surgery.    We discussed the adjuvant treatment plan with her.  We recommended adjuvant chemotherapy based on the current NCCN guidelines since she has involvement of the lymph node.  We recommended FOLFOX for 6 cycles.  Discussed the chemotherapy regimen in detail. She had her mediport placed on 1/18/24 and began treatment on 1/22/24.    Plan: FOLFOX (adjuvant) x 6 cycles.    Tolerated previous cycle fairly well with the exception of worsening fatigue. She is due for cycle 5 tomorrow 3/26. We discussed this in detail today and will plan to decrease 5 Fu and Oxaliplatin to 80% of original dose for better symptom management. We will also continue with IVFs with treatment and pump disconnect.     Follow-up: In 2 weeks for the next cycle.      I have placed all orders as outlined above. I advised the patient to schedule the tests and follow-up appointment as discussed by contacting the  on the way out or calling by phone.    Providers:  Surgeon: Lev Brothers.  [facility]   [facility]    Chief complaint:  Resected sigmoid colon adenocarcinoma.    HPI:  ONCOLOGIC HISTORY-  October 2, 2023: Had a colonoscopy in an outside facility when she presented with abdominal pain.  Colonoscopy  showed a sigmoid mass.  Biopsy confirmed adenocarcinoma.  She also also had multiple polyps in her colon some of which have been removed.    November 9, 2023: She had a CT scan of chest abdomen pelvis which did not show any distant metastatic disease.  CT scan showed a large goiter.  She tells me that she has an endocrinologist who she follows up with.    November 15, 2023: She underwent colectomy.  Pathology confirmed pT3 pN1 adenocarcinoma without any involvement of the margins.    January 22, 2024: Started first cycle of chemotherapy with FOLFOX.    Interval history:    I have shared my name and active licensure. The patient’s identity and physical location were verified. Patient has been informed of the risks, benefits, and alternative of a remote visit. Patient consented to the remote visit.    Marisabel is due for cycle 5 tomorrow. Does report she notices increased fatigue and feels that it is getting in the way of her daily activities as she finds herself sleeping much more since last treatment. Denies any other new complaints today. Appetite is decent. Denies any ongoing issues with fevers, chills, chest pain, SOB, N/V, diarrhea, constipation, urianry symptoms, skin rashes, or bleeding/bruising issues.     Review of systems: Negative unless otherwise stated in HPI     Past Medical History:   Past Medical History:  No date: Anxiety  No date: Colon polyp  No date: Depression  No date: HLD (hyperlipidemia)  No date: Hypothyroidism  No date: Mass of colon      Comment:  sigmoid colon   Surgical History:    Past Surgical History:   Procedure Laterality Date    CATARACT EXTRACTION Bilateral     COLONOSCOPY      HYSTERECTOMY        Family History:    Family History   Problem Relation Name Age of Onset    No Known Problems Mother      Cancer Father      Diabetes Father      Diabetes Sister      Diabetes Brother       Family Oncology History:    Cancer-related family history includes Cancer in her father.  Social  History:    Social History     Tobacco Use    Smoking status: Every Day     Packs/day: 1     Types: Cigarettes     Last attempt to quit: 2023     Years since quittin.3     Passive exposure: Never    Smokeless tobacco: Never   Vaping Use    Vaping Use: Never used   Substance Use Topics    Alcohol use: Never    Drug use: Never        Allergies  No Known Allergies     Medications  Current Outpatient Medications   Medication Instructions    amitriptyline (Elavil) 25 mg tablet 2 times daily    atorvastatin (LIPITOR) 20 mg, oral, Daily    hyoscyamine (ANASPAZ) 0.125 mg, oral, Every 4 hours PRN    levothyroxine (SYNTHROID, LEVOXYL) 25 mcg, oral    lidocaine-prilocaine (Emla) 2.5-2.5 % cream Apply to chest site 30-45 minutes prior to port being accessed for use    loperamide (Imodium) 2 mg capsule Take 2 capsules (4 mg) by mouth with the first episode of diarrhea and 1 capsule (2 mg) by mouth with any additional episodes. Maximum 8 capsules (16 mg) per day.    magnesium hydroxide (Milk of Magnesia) 400 mg/5 mL suspension oral, Daily PRN    multivitamin tablet 1 tablet, oral, Daily    ondansetron (ZOFRAN) 8 mg, oral, Every 8 hours PRN    prochlorperazine (COMPAZINE) 10 mg, oral, Every 6 hours PRN    sertraline (ZOLOFT) 100 mg, oral, Daily     Objective   VS: There were no vitals taken for this visit.  Weight:   There were no vitals filed for this visit.       PHYSICAL EXAMINATION--- visit done via telephone     Lyubov ORDONEZ-CNP

## 2024-03-26 ENCOUNTER — OFFICE VISIT (OUTPATIENT)
Dept: HEMATOLOGY/ONCOLOGY | Facility: CLINIC | Age: 74
End: 2024-03-26
Payer: MEDICARE

## 2024-03-26 ENCOUNTER — INFUSION (OUTPATIENT)
Dept: HEMATOLOGY/ONCOLOGY | Facility: CLINIC | Age: 74
End: 2024-03-26
Payer: MEDICARE

## 2024-03-26 VITALS
SYSTOLIC BLOOD PRESSURE: 100 MMHG | RESPIRATION RATE: 16 BRPM | HEIGHT: 67 IN | HEART RATE: 99 BPM | TEMPERATURE: 97.2 F | OXYGEN SATURATION: 96 % | WEIGHT: 121.25 LBS | BODY MASS INDEX: 19.03 KG/M2 | DIASTOLIC BLOOD PRESSURE: 65 MMHG

## 2024-03-26 VITALS
WEIGHT: 121.25 LBS | HEART RATE: 99 BPM | SYSTOLIC BLOOD PRESSURE: 100 MMHG | RESPIRATION RATE: 16 BRPM | HEIGHT: 67 IN | BODY MASS INDEX: 19.03 KG/M2 | DIASTOLIC BLOOD PRESSURE: 65 MMHG | TEMPERATURE: 97.2 F | OXYGEN SATURATION: 96 %

## 2024-03-26 DIAGNOSIS — C18.7 MALIGNANT NEOPLASM OF SIGMOID COLON (MULTI): ICD-10-CM

## 2024-03-26 DIAGNOSIS — C18.9 MALIGNANT NEOPLASM OF COLON, UNSPECIFIED PART OF COLON (MULTI): ICD-10-CM

## 2024-03-26 PROCEDURE — 2500000004 HC RX 250 GENERAL PHARMACY W/ HCPCS (ALT 636 FOR OP/ED)

## 2024-03-26 PROCEDURE — 99214 OFFICE O/P EST MOD 30 MIN: CPT | Mod: 25 | Performed by: INTERNAL MEDICINE

## 2024-03-26 PROCEDURE — 1126F AMNT PAIN NOTED NONE PRSNT: CPT | Performed by: INTERNAL MEDICINE

## 2024-03-26 PROCEDURE — 99204 OFFICE O/P NEW MOD 45 MIN: CPT | Performed by: INTERNAL MEDICINE

## 2024-03-26 PROCEDURE — 96367 TX/PROPH/DG ADDL SEQ IV INF: CPT | Mod: INF

## 2024-03-26 PROCEDURE — 1159F MED LIST DOCD IN RCRD: CPT | Performed by: INTERNAL MEDICINE

## 2024-03-26 PROCEDURE — 96413 CHEMO IV INFUSION 1 HR: CPT

## 2024-03-26 PROCEDURE — 96366 THER/PROPH/DIAG IV INF ADDON: CPT | Mod: INF

## 2024-03-26 PROCEDURE — 1160F RVW MEDS BY RX/DR IN RCRD: CPT | Performed by: INTERNAL MEDICINE

## 2024-03-26 PROCEDURE — 96415 CHEMO IV INFUSION ADDL HR: CPT

## 2024-03-26 PROCEDURE — 96368 THER/DIAG CONCURRENT INF: CPT

## 2024-03-26 PROCEDURE — 96411 CHEMO IV PUSH ADDL DRUG: CPT

## 2024-03-26 PROCEDURE — 96361 HYDRATE IV INFUSION ADD-ON: CPT | Mod: INF

## 2024-03-26 PROCEDURE — 96375 TX/PRO/DX INJ NEW DRUG ADDON: CPT | Mod: INF

## 2024-03-26 RX ORDER — PROCHLORPERAZINE MALEATE 10 MG
10 TABLET ORAL EVERY 6 HOURS PRN
Status: DISCONTINUED | OUTPATIENT
Start: 2024-03-26 | End: 2024-03-26 | Stop reason: HOSPADM

## 2024-03-26 RX ORDER — EPINEPHRINE 0.3 MG/.3ML
0.3 INJECTION SUBCUTANEOUS EVERY 5 MIN PRN
Status: DISCONTINUED | OUTPATIENT
Start: 2024-03-26 | End: 2024-03-26 | Stop reason: HOSPADM

## 2024-03-26 RX ORDER — PROCHLORPERAZINE MALEATE 10 MG
10 TABLET ORAL EVERY 6 HOURS PRN
OUTPATIENT
Start: 2024-04-08

## 2024-03-26 RX ORDER — PROCHLORPERAZINE EDISYLATE 5 MG/ML
10 INJECTION INTRAMUSCULAR; INTRAVENOUS EVERY 6 HOURS PRN
Status: DISCONTINUED | OUTPATIENT
Start: 2024-03-26 | End: 2024-03-26 | Stop reason: HOSPADM

## 2024-03-26 RX ORDER — HEPARIN 100 UNIT/ML
500 SYRINGE INTRAVENOUS AS NEEDED
Status: DISCONTINUED | OUTPATIENT
Start: 2024-03-26 | End: 2024-03-26 | Stop reason: HOSPADM

## 2024-03-26 RX ORDER — LORAZEPAM 2 MG/ML
1 INJECTION INTRAMUSCULAR AS NEEDED
Status: DISCONTINUED | OUTPATIENT
Start: 2024-03-26 | End: 2024-03-26 | Stop reason: HOSPADM

## 2024-03-26 RX ORDER — ALBUTEROL SULFATE 0.83 MG/ML
3 SOLUTION RESPIRATORY (INHALATION) AS NEEDED
OUTPATIENT
Start: 2024-04-08

## 2024-03-26 RX ORDER — DIPHENHYDRAMINE HYDROCHLORIDE 50 MG/ML
50 INJECTION INTRAMUSCULAR; INTRAVENOUS AS NEEDED
Status: DISCONTINUED | OUTPATIENT
Start: 2024-03-26 | End: 2024-03-26 | Stop reason: HOSPADM

## 2024-03-26 RX ORDER — EPINEPHRINE 0.3 MG/.3ML
0.3 INJECTION SUBCUTANEOUS EVERY 5 MIN PRN
OUTPATIENT
Start: 2024-04-08

## 2024-03-26 RX ORDER — PALONOSETRON 0.05 MG/ML
0.25 INJECTION, SOLUTION INTRAVENOUS ONCE
Status: COMPLETED | OUTPATIENT
Start: 2024-03-26 | End: 2024-03-26

## 2024-03-26 RX ORDER — FAMOTIDINE 10 MG/ML
20 INJECTION INTRAVENOUS ONCE AS NEEDED
Status: DISCONTINUED | OUTPATIENT
Start: 2024-03-26 | End: 2024-03-26 | Stop reason: HOSPADM

## 2024-03-26 RX ORDER — ALBUTEROL SULFATE 0.83 MG/ML
3 SOLUTION RESPIRATORY (INHALATION) AS NEEDED
Status: DISCONTINUED | OUTPATIENT
Start: 2024-03-26 | End: 2024-03-26 | Stop reason: HOSPADM

## 2024-03-26 RX ORDER — HEPARIN SODIUM,PORCINE/PF 10 UNIT/ML
50 SYRINGE (ML) INTRAVENOUS AS NEEDED
Status: CANCELLED | OUTPATIENT
Start: 2024-03-26

## 2024-03-26 RX ORDER — HEPARIN 100 UNIT/ML
500 SYRINGE INTRAVENOUS AS NEEDED
Status: CANCELLED | OUTPATIENT
Start: 2024-03-26

## 2024-03-26 RX ORDER — LORAZEPAM 2 MG/ML
1 INJECTION INTRAMUSCULAR AS NEEDED
OUTPATIENT
Start: 2024-04-08

## 2024-03-26 RX ORDER — FAMOTIDINE 10 MG/ML
20 INJECTION INTRAVENOUS ONCE AS NEEDED
OUTPATIENT
Start: 2024-04-08

## 2024-03-26 RX ORDER — DIPHENHYDRAMINE HYDROCHLORIDE 50 MG/ML
50 INJECTION INTRAMUSCULAR; INTRAVENOUS AS NEEDED
OUTPATIENT
Start: 2024-04-08

## 2024-03-26 RX ORDER — PALONOSETRON 0.05 MG/ML
0.25 INJECTION, SOLUTION INTRAVENOUS ONCE
OUTPATIENT
Start: 2024-04-08

## 2024-03-26 RX ORDER — PROCHLORPERAZINE EDISYLATE 5 MG/ML
10 INJECTION INTRAMUSCULAR; INTRAVENOUS EVERY 6 HOURS PRN
OUTPATIENT
Start: 2024-04-08

## 2024-03-26 RX ADMIN — SODIUM CHLORIDE 1000 ML: 9 INJECTION, SOLUTION INTRAVENOUS at 10:10

## 2024-03-26 RX ADMIN — OXALIPLATIN 115 MG: 5 INJECTION, SOLUTION INTRAVENOUS at 11:23

## 2024-03-26 RX ADMIN — DEXAMETHASONE SODIUM PHOSPHATE 12 MG: 4 INJECTION, SOLUTION INTRA-ARTICULAR; INTRALESIONAL; INTRAMUSCULAR; INTRAVENOUS; SOFT TISSUE at 10:55

## 2024-03-26 RX ADMIN — FLUOROURACIL 3300 MG: 50 INJECTION, SOLUTION INTRAVENOUS at 13:53

## 2024-03-26 RX ADMIN — PALONOSETRON HYDROCHLORIDE 250 MCG: 0.25 INJECTION INTRAVENOUS at 10:54

## 2024-03-26 RX ADMIN — LEUCOVORIN CALCIUM 680 MG: 350 INJECTION, POWDER, LYOPHILIZED, FOR SUSPENSION INTRAMUSCULAR; INTRAVENOUS at 11:23

## 2024-03-26 ASSESSMENT — PAIN SCALES - GENERAL
PAINLEVEL: 0-NO PAIN
PAINLEVEL: 0-NO PAIN

## 2024-03-26 NOTE — PROGRESS NOTES
Pt here for c5 folfox. Tolerated infusion well. She is aware of plan of care, will call with further questions or concerns. Will return 3/28 for pump discontinue and fluids

## 2024-03-26 NOTE — PROGRESS NOTES
Patient ID: Marisabel Richardson is a 73 y.o. female.     Diagnoses:   1. 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  Proficient MMR.  2.  A large goiter with a history of hypothyroidism-currently on replacement dose of thyroxine.     Genomic profile: pMM    Treatment       FOLFOX (adjuvant) x 6 cycles.    Started cycle 1 on 1/22/24.    Chief complaint:  Resected sigmoid colon adenocarcinoma.     HPI:  ONCOLOGIC HISTORY-  October 2, 2023: Had a colonoscopy in an outside facility when she presented with abdominal pain.  Colonoscopy showed a sigmoid mass.  Biopsy confirmed adenocarcinoma.  She also also had multiple polyps in her colon some of which have been removed.     November 9, 2023: She had a CT scan of chest abdomen pelvis which did not show any distant metastatic disease.  CT scan showed a large goiter.  She tells me that she has an endocrinologist who she follows up with.     November 15, 2023: She underwent colectomy.  Pathology confirmed pT3 pN1 adenocarcinoma without any involvement of the margins.     January 22, 2024: Started first cycle of chemotherapy with FOLFOX.     Interval history:  Patient complains of weakness fatigue, no sore throat, no diarrhea, appetite is fair.  Patient doing daily activities not doing regular exercise.  Unfortunately patient still smoke, patient will receive fifth dose of chemotherapy today     Denies any ongoing issues with fevers, chills, chest pain, SOB, N/V, diarrhea, constipation, urianry symptoms, skin rashes, or bleeding/bruising issues.      Review of systems: Negative except weakness, fatigue, tingling numbness of finger and toe     Past Medical History:     Past Medical History   Past Medical History:  No date: Anxiety  No date: Colon polyp  No date: Depression  No date: HLD (hyperlipidemia)  No date: Hypothyroidism  No date: Mass of colon      Comment:  sigmoid colon      Surgical History:    Surgical History          Past Surgical History:   Procedure Laterality Date    CATARACT EXTRACTION Bilateral      COLONOSCOPY        HYSTERECTOMY             Family History:    Family History[]Expand by Default          Family History   Problem Relation Name Age of Onset    No Known Problems Mother        Cancer Father        Diabetes Father        Diabetes Sister        Diabetes Brother             Family Oncology History:    Cancer-related family history includes Cancer in her father.  Social History:    Social History            Tobacco Use    Smoking status: Every Day       Packs/day: 1       Types: Cigarettes       Last attempt to quit: 2023       Years since quittin.2       Passive exposure: Never    Smokeless tobacco: Never   Vaping Use    Vaping Use: Never used   Substance Use Topics    Alcohol use: Never    Drug use: Never         Allergies  No Known Allergies   Medications       Current Outpatient Medications   Medication Instructions    amitriptyline (Elavil) 25 mg tablet 2 times daily    atorvastatin (LIPITOR) 20 mg, oral, Daily    hyoscyamine (ANASPAZ) 0.125 mg, oral, Every 4 hours PRN    levothyroxine (SYNTHROID, LEVOXYL) 25 mcg, oral    lidocaine-prilocaine (Emla) 2.5-2.5 % cream Apply to chest site 30-45 minutes prior to port being accessed for use    loperamide (Imodium) 2 mg capsule Take 2 capsules (4 mg) by mouth with the first episode of diarrhea and 1 capsule (2 mg) by mouth with any additional episodes. Maximum 8 capsules (16 mg) per day.    magnesium hydroxide (Milk of Magnesia) 400 mg/5 mL suspension oral, Daily PRN    multivitamin tablet 1 tablet, oral, Daily    ondansetron (ZOFRAN) 8 mg, oral, Every 8 hours PRN    prochlorperazine (COMPAZINE) 10 mg, oral, Every 6 hours PRN    sertraline (ZOLOFT)    Objective []Expand by Default  VS: /79 (BP Location: Right arm, Patient Position: Sitting, BP Cuff Size: Adult)   Pulse 106   Temp 36.1 °C (97 °F) (Temporal)   Resp 18   Wt 56.4 kg (124 lb 7.2 oz)    SpO2 97%   BMI 19.37 kg/m²    PHYSICAL EXAMINATION     Constitutional: Awake/alert/oriented x3, cooperative and answers questions appropriately.      Eyes: No pallor, conjunctival injection, clear sclera.     Mouth: No ulceration. No thrush. Moist/ clear      Respiratory/Thorax: Normal breath sounds with bilaterally symmetrical chest expansion. No dullness.       Cardiovascular: No audible murmurs, normal heart sounds. Palpable pulses      Gastrointestinal: Nondistended, soft, non-tender, no rebound tenderness or guarding, no masses palpable, no organomegaly, +BS, no bruits. No ascites.      Musculoskeletal: No joint swelling, redness.       Extremities: normal extremities, no cyanosis edema, contusions or wounds, no clubbing.      Lymphatic: No significant lymphadenopathy.      Skin: Warm and dry, no lesions, no rashes.   Labs    Ref Range & Units 1 d ago  (3/25/24) 2 wk ago  (3/11/24) 4 wk ago  (2/26/24) 1 mo ago  (2/12/24) 1 mo ago  (2/5/24) 2 mo ago  (1/22/24) 2 mo ago  (1/1/24)   WBC  4.4 - 11.3 x10*3/uL 3.2 Low  3.7 Low  5.0 3.7 Low  3.3 Low  5.4 10.4   nRBC     CM 0.0 R 0.0 R   Comment: Not Measured   RBC  4.00 - 5.20 x10*6/uL 4.50 4.57 4.50 4.66 4.45 4.55 4.22   Hemoglobin  12.0 - 16.0 g/dL 14.4 14.3 14.1 14.6 13.8 14.1 13.3   Hematocrit  36.0 - 46.0 % 41.8 42.4 41.8 42.7 40.2 41.4 39.3   MCV  80 - 100 fL 93 93 93 92 90 91 93   MCH  26.0 - 34.0 pg 32.0 31.3 31.3 31.3 31.0 31.0 31.5   MCHC  32.0 - 36.0 g/dL 34.4 33.7 33.7 34.2 34.3 34.1 33.8   RDW  11.5 - 14.5 % 14.5 14.4 13.5 13.1 12.7 12.3 12.5   Platelets  150 - 450 x10*3/uL 93 Low  105 Low  152 179 157 208 218     Assessment and Plan:  This is a pleasant woman who presented with a diagnosis of localized sigmoid colon adenocarcinoma.  She had a colonoscopy in October 2023 when she had abdominal pain.  Colonoscopy revealed multiple colonic polyps and a sigmoid colon mass.  Biopsy confirmed adenocarcinoma, proficient MMR.  Subsequently on November 15, 2023  she underwent colectomy by Dr. Lev Stanton.  She recovered well from the surgery.     We discussed the adjuvant treatment plan with her.  We recommended adjuvant chemotherapy based on the current NCCN guidelines since she has involvement of the lymph node.  We recommended FOLFOX for 6 cycles.  Discussed the chemotherapy regimen in detail. She had her mediport placed on 1/18/24. Started cycle 1 on 1/22/24.     Plan: FOLFOX (adjuvant) x 6 cycles.  Last dose of chemotherapy was giving after 2 weeks.    I will schedule for PET scan after 4 months    Follow-up after PET scan.    Smoking cessation consultation, unfortunately patient still smokes discussed briefly regarding possible side effect and long-term patient promised to try to stop smoking.    Time spent

## 2024-03-26 NOTE — SIGNIFICANT EVENT
03/26/24 0942   Prechemo Checklist   Has the patient been in the hospital, ED, or urgent care since last date of service No   Chemo/Immuno Consent Signed Yes   Protocol/Indications Verified Yes   Confirmed to previous date/time of medication Yes   Compared to previous dose Yes  (Dose reduced)   All medications are dated accurately Yes   Pregnancy Test Negative Not applicable   Parameters Met Yes   BSA/Weight-Height Verified Yes   Dose Calculations Verified Yes

## 2024-03-26 NOTE — PATIENT INSTRUCTIONS
Marisabel was seen today by Dr. Gu for assumption of care  Today Marisabel received cycle#5  She will return in 4mons after PET scan

## 2024-03-28 ENCOUNTER — INFUSION (OUTPATIENT)
Dept: HEMATOLOGY/ONCOLOGY | Facility: CLINIC | Age: 74
End: 2024-03-28
Payer: MEDICARE

## 2024-03-28 VITALS
HEIGHT: 70 IN | BODY MASS INDEX: 17.18 KG/M2 | SYSTOLIC BLOOD PRESSURE: 91 MMHG | HEART RATE: 99 BPM | RESPIRATION RATE: 16 BRPM | WEIGHT: 120 LBS | DIASTOLIC BLOOD PRESSURE: 64 MMHG | OXYGEN SATURATION: 92 % | TEMPERATURE: 97.9 F

## 2024-03-28 DIAGNOSIS — C18.7 MALIGNANT NEOPLASM OF SIGMOID COLON (MULTI): ICD-10-CM

## 2024-03-28 DIAGNOSIS — C18.9 MALIGNANT NEOPLASM OF COLON, UNSPECIFIED PART OF COLON (MULTI): ICD-10-CM

## 2024-03-28 PROCEDURE — 2500000004 HC RX 250 GENERAL PHARMACY W/ HCPCS (ALT 636 FOR OP/ED)

## 2024-03-28 PROCEDURE — 2500000004 HC RX 250 GENERAL PHARMACY W/ HCPCS (ALT 636 FOR OP/ED): Performed by: INTERNAL MEDICINE

## 2024-03-28 PROCEDURE — 96360 HYDRATION IV INFUSION INIT: CPT | Mod: INF

## 2024-03-28 RX ORDER — HEPARIN 100 UNIT/ML
500 SYRINGE INTRAVENOUS AS NEEDED
OUTPATIENT
Start: 2024-03-28

## 2024-03-28 RX ORDER — HEPARIN SODIUM,PORCINE/PF 10 UNIT/ML
50 SYRINGE (ML) INTRAVENOUS AS NEEDED
OUTPATIENT
Start: 2024-03-28

## 2024-03-28 RX ORDER — HEPARIN 100 UNIT/ML
500 SYRINGE INTRAVENOUS AS NEEDED
Status: DISCONTINUED | OUTPATIENT
Start: 2024-03-28 | End: 2024-03-28 | Stop reason: HOSPADM

## 2024-03-28 RX ADMIN — Medication 500 UNITS: at 12:26

## 2024-03-28 RX ADMIN — SODIUM CHLORIDE 1000 ML: 9 INJECTION, SOLUTION INTRAVENOUS at 12:26

## 2024-03-28 ASSESSMENT — PAIN SCALES - GENERAL: PAINLEVEL: 0-NO PAIN

## 2024-03-28 NOTE — PROGRESS NOTES
Pt here for pump disconnect and IV fluids. Pt toelrated well, pt states side effects were managed better this cycle with decreased dose. Pt is aware of plan of care, will call with further questions or concerns.

## 2024-04-01 ENCOUNTER — TELEPHONE (OUTPATIENT)
Dept: HEMATOLOGY/ONCOLOGY | Facility: CLINIC | Age: 74
End: 2024-04-01
Payer: MEDICARE

## 2024-04-01 NOTE — TELEPHONE ENCOUNTER
Called and spoke with patient. She does not want any more chemo treatment. She is agreeable to keeping her visit with Dr. Odom on Monday, 4/8, but wants it changed to a phone visit (ok per Dr. Odom).     Lyubov Saravia was updated as well. She will try to call mid-week to check on the patient.

## 2024-04-02 NOTE — TELEPHONE ENCOUNTER
Did attempt to reach out to patient as requested. Left 2 messages today for patient. Encouraged her to reach back out with any questions or concerns  prior to her phone visit on Monday 4/8.

## 2024-04-04 ENCOUNTER — TELEPHONE (OUTPATIENT)
Dept: HEMATOLOGY/ONCOLOGY | Facility: CLINIC | Age: 74
End: 2024-04-04
Payer: MEDICARE

## 2024-04-04 NOTE — TELEPHONE ENCOUNTER
Called left voicemail for patient on identifiable voicemail that this RN will be canceling her appt with Dr. Odom on Monday 4/8/24, as she has established care with another oncologist in her area and she has a scan and follow up scheduled with Dr. Gu. This RN told her to return my call if she had any questions.

## 2024-04-08 ENCOUNTER — APPOINTMENT (OUTPATIENT)
Dept: HEMATOLOGY/ONCOLOGY | Facility: CLINIC | Age: 74
End: 2024-04-08
Payer: MEDICARE

## 2024-04-09 ENCOUNTER — APPOINTMENT (OUTPATIENT)
Dept: HEMATOLOGY/ONCOLOGY | Facility: CLINIC | Age: 74
End: 2024-04-09
Payer: MEDICARE

## 2024-04-10 ENCOUNTER — TELEPHONE (OUTPATIENT)
Dept: HEMATOLOGY/ONCOLOGY | Facility: CLINIC | Age: 74
End: 2024-04-10
Payer: MEDICARE

## 2024-04-10 DIAGNOSIS — C18.9 MALIGNANT NEOPLASM OF COLON, UNSPECIFIED PART OF COLON (MULTI): ICD-10-CM

## 2024-04-10 NOTE — TELEPHONE ENCOUNTER
She needs her mediport out before she can [place] and would like to talk to someone about this

## 2024-04-10 NOTE — TELEPHONE ENCOUNTER
"Phone call to Marisabel to discuss having her mediport removed, no answer, left a msg for a return call.   Marisabel returned my call stating she wanted to leave to go out west and she wanted the port out before she left. Explained that dr. Gu was out of town until Monday and this RN would have to speak with him before scheduling any procedure. Explained that Marisabel just finished treatment and typically a port would not be removed until after a scan was completed to make sure no residual disease was present. Marisabel stated, \" I am never having chemo again so make sure dr. Gu know that.\"  Informed her that information would be passed along  Spoke with dr. Gu who stated Marisabel should wait until she has a repeat PET in June, explained that Marisabel stated she \"would never have chemo again even if that scan shows disease\". Dr. Gu stated the port could be removed, an order will be placed  Spoke with Karlee in IR, she will call the patient to have procedure  "

## 2024-04-11 ENCOUNTER — APPOINTMENT (OUTPATIENT)
Dept: HEMATOLOGY/ONCOLOGY | Facility: CLINIC | Age: 74
End: 2024-04-11
Payer: MEDICARE

## 2024-04-25 ENCOUNTER — HOSPITAL ENCOUNTER (OUTPATIENT)
Dept: CARDIOLOGY | Facility: HOSPITAL | Age: 74
Discharge: HOME | End: 2024-04-25
Payer: MEDICARE

## 2024-04-25 VITALS
HEART RATE: 75 BPM | OXYGEN SATURATION: 98 % | DIASTOLIC BLOOD PRESSURE: 78 MMHG | TEMPERATURE: 98.7 F | RESPIRATION RATE: 19 BRPM | SYSTOLIC BLOOD PRESSURE: 148 MMHG

## 2024-04-25 DIAGNOSIS — C18.9 MALIGNANT NEOPLASM OF COLON, UNSPECIFIED PART OF COLON (MULTI): ICD-10-CM

## 2024-04-25 PROCEDURE — 2500000004 HC RX 250 GENERAL PHARMACY W/ HCPCS (ALT 636 FOR OP/ED): Performed by: RADIOLOGY

## 2024-04-25 PROCEDURE — 99152 MOD SED SAME PHYS/QHP 5/>YRS: CPT

## 2024-04-25 PROCEDURE — 2500000005 HC RX 250 GENERAL PHARMACY W/O HCPCS: Performed by: RADIOLOGY

## 2024-04-25 PROCEDURE — 7100000010 HC PHASE TWO TIME - EACH INCREMENTAL 1 MINUTE

## 2024-04-25 PROCEDURE — 7100000009 HC PHASE TWO TIME - INITIAL BASE CHARGE

## 2024-04-25 PROCEDURE — 36590 REMOVAL TUNNELED CV CATH: CPT | Performed by: RADIOLOGY

## 2024-04-25 PROCEDURE — 99152 MOD SED SAME PHYS/QHP 5/>YRS: CPT | Performed by: RADIOLOGY

## 2024-04-25 RX ORDER — MIDAZOLAM HYDROCHLORIDE 1 MG/ML
INJECTION, SOLUTION INTRAMUSCULAR; INTRAVENOUS
Status: COMPLETED | OUTPATIENT
Start: 2024-04-25 | End: 2024-04-25

## 2024-04-25 RX ORDER — HEPARIN 100 UNIT/ML
500 SYRINGE INTRAVENOUS ONCE AS NEEDED
Status: DISCONTINUED | OUTPATIENT
Start: 2024-04-25 | End: 2024-04-26 | Stop reason: HOSPADM

## 2024-04-25 RX ORDER — ONDANSETRON HYDROCHLORIDE 2 MG/ML
4 INJECTION, SOLUTION INTRAVENOUS EVERY 6 HOURS PRN
Status: DISCONTINUED | OUTPATIENT
Start: 2024-04-25 | End: 2024-04-26 | Stop reason: HOSPADM

## 2024-04-25 RX ORDER — FENTANYL CITRATE 50 UG/ML
INJECTION, SOLUTION INTRAMUSCULAR; INTRAVENOUS
Status: COMPLETED | OUTPATIENT
Start: 2024-04-25 | End: 2024-04-25

## 2024-04-25 RX ORDER — LIDOCAINE HYDROCHLORIDE 20 MG/ML
INJECTION, SOLUTION EPIDURAL; INFILTRATION; INTRACAUDAL; PERINEURAL
Status: COMPLETED | OUTPATIENT
Start: 2024-04-25 | End: 2024-04-25

## 2024-04-25 RX ADMIN — LIDOCAINE HYDROCHLORIDE 5 ML: 20 INJECTION, SOLUTION EPIDURAL; INFILTRATION; INTRACAUDAL; PERINEURAL at 10:44

## 2024-04-25 RX ADMIN — FENTANYL CITRATE 100 MCG: 50 INJECTION INTRAMUSCULAR; INTRAVENOUS at 10:44

## 2024-04-25 RX ADMIN — MIDAZOLAM 1 MG: 1 INJECTION INTRAMUSCULAR; INTRAVENOUS at 10:43

## 2024-04-25 ASSESSMENT — PAIN - FUNCTIONAL ASSESSMENT
PAIN_FUNCTIONAL_ASSESSMENT: 0-10

## 2024-04-25 ASSESSMENT — PAIN SCALES - GENERAL
PAINLEVEL_OUTOF10: 0 - NO PAIN

## 2024-04-25 NOTE — PRE-SEDATION DOCUMENTATION
Interventional Radiology Preprocedure Note    Marisabeldon Goodmanrach   Indication for procedure: The encounter diagnosis was Malignant neoplasm of colon, unspecified part of colon (Multi).    Relevant review of systems:  denies shortness of breath      /75   Pulse 82   Temp 37.1 °C (98.7 °F) (Temporal)   Resp 16   SpO2 98%    Relevant Labs:   Lab Results   Component Value Date    CREATININE 0.63 03/25/2024    EGFR >90 03/25/2024       Planned Sedation/Anesthesia: Moderate    Airway assessment: normal      Physical Exam  Constitutional:       Appearance: Normal appearance.   Cardiovascular:      Rate and Rhythm: Normal rate and regular rhythm.      Pulses: Normal pulses.      Heart sounds: Normal heart sounds.   Pulmonary:      Effort: Pulmonary effort is normal.      Comments: Diminished     Skin:     General: Skin is warm and dry.      Capillary Refill: Capillary refill takes less than 2 seconds.   Neurological:      General: No focal deficit present.      Mental Status: She is alert and oriented to person, place, and time.         Mallampati: III (soft and hard palate and base of uvula visible)    ASA Score: ASA 3 - Patient with moderate systemic disease with functional limitations    Benefits, risks and alternatives of procedure and planned sedation have been discussed with the patient and/or their representative. All questions answered and they agree to proceed.     Katelyn Gutierrez, APRN-CNP

## 2024-04-25 NOTE — POST-PROCEDURE NOTE
Interventional Radiology Brief Postprocedure Note    Attending: Sal Mercedes MD      Assistant: none    Diagnosis: port no longer needed    Description of procedure: port removal     Anesthesia:  Local, mod sed    Complications: None    Estimated Blood Loss: minimal    No specimens collected      See detailed result report with images in PACS.    The patient tolerated the procedure well without incident or complication.

## 2024-04-25 NOTE — DISCHARGE INSTRUCTIONS
Instructions after Your Port Placement   Today, you had your port placed in Interventional Radiology/Specials department. Your port will be used for blood draws, imaging studies like CT-scans or MRIs and used for infusions and/ or chemotherapy etc.     Activity  For the first day, rest with light walking as tolerated. You might feel more tired than usual.   Do not lift anything heavier than 10 lbs. (around the weight of 1 gallon milk jug) for 24 hours. You can lift weights heavier than 10 lbs. on _______      .  Do not drive for the first 24 hours. You can drive on ________________.   The following day after the procedure, you can get back to your normal activities.    Port incision and neck area care   Keep your dressing over your chest area clean, dry and in place for 5-7 days. If you have a scheduled infusion before your dressing removal date, it is okay to let the staff remove your dressing and use your port.   You may remove chest dressing on _____________ and leave the area uncovered.   You may remove your clear neck dressing 2 days after your procedure.   You may remove neck dressing on ____________ and leave the area uncovered.   Do not remove the strips of tape on your chest or neck area. Let theses fall off on their own. Do not peel off any glue that might be over your incision.  The stitches used to close the skin around the port will dissolve on their own.   You may shower 3 days after your procedure. Continue to protect the dressing from direct water.   Do not submerge your port area in bath tub, swimming pool or hot tub until it is fully healed.   Once you remove a dressing, gently clean the area with soap and water and pat it dry with clean towel.   Check your port area every day for any signs of infection   Redness   Drainage  Increased warmth around the port site   Pus or foul odor   Fever or chills or increased pain at the port site  If you have any of these signs or symptoms of infection:  Please call and leave a message for our IR/Specials Nurse Practitioner Angelica Matt at [phone]. She will return your call the same business day. If unable to reach Angelica, please call [phone] and ask to speak with a specials nurse. We are here M-F 7-4 PM.    Pain  After your procedure, when the numbing medicine wears off you might experience mild to moderate pain in the neck and chest area.   To help with pain you may apply ice pack 3 to 4 times a day for 20 minutes at a time to the chest and neck area. Be careful to not get the dressing area wet.      Medications for pain  Please refer to the medications on your discharge paperwork   If you are having pain, please take what you normally would to help relieve your pain.   If you have a prescription medication for chronic pain, continue taking your current regiment and that will help reduce or alleviate pain at the site of the incision as well.   Pain at the incision typically lasts 3-7 days and continues to lessen each day      Bleeding  If you have oozing from the port site that extends to the edges of the dressing. Hold pressure over the neck and chest area. If oozing does not stop in 5 minutes, call 911 or come straight to the emergency room to be evaluated.

## 2024-06-13 ENCOUNTER — HOSPITAL ENCOUNTER (EMERGENCY)
Facility: HOSPITAL | Age: 74
Discharge: HOME | End: 2024-06-14
Payer: MEDICARE

## 2024-06-13 VITALS
HEART RATE: 92 BPM | RESPIRATION RATE: 20 BRPM | OXYGEN SATURATION: 97 % | HEIGHT: 69 IN | TEMPERATURE: 98.5 F | WEIGHT: 120 LBS | BODY MASS INDEX: 17.77 KG/M2 | DIASTOLIC BLOOD PRESSURE: 79 MMHG | SYSTOLIC BLOOD PRESSURE: 146 MMHG

## 2024-06-13 DIAGNOSIS — R31.9 HEMATURIA, UNSPECIFIED TYPE: ICD-10-CM

## 2024-06-13 DIAGNOSIS — N13.30 HYDRONEPHROSIS, UNSPECIFIED HYDRONEPHROSIS TYPE: ICD-10-CM

## 2024-06-13 DIAGNOSIS — R39.15 URINARY URGENCY: Primary | ICD-10-CM

## 2024-06-13 LAB
APPEARANCE UR: CLEAR
BILIRUB UR STRIP.AUTO-MCNC: NEGATIVE MG/DL
COLOR UR: COLORLESS
GLUCOSE UR STRIP.AUTO-MCNC: NORMAL MG/DL
KETONES UR STRIP.AUTO-MCNC: NEGATIVE MG/DL
LEUKOCYTE ESTERASE UR QL STRIP.AUTO: NEGATIVE
MUCOUS THREADS #/AREA URNS AUTO: NORMAL /LPF
NITRITE UR QL STRIP.AUTO: NEGATIVE
PH UR STRIP.AUTO: 5.5 [PH]
PROT UR STRIP.AUTO-MCNC: NEGATIVE MG/DL
RBC # UR STRIP.AUTO: ABNORMAL /UL
RBC #/AREA URNS AUTO: NORMAL /HPF
SP GR UR STRIP.AUTO: 1
UROBILINOGEN UR STRIP.AUTO-MCNC: NORMAL MG/DL
WBC #/AREA URNS AUTO: NORMAL /HPF

## 2024-06-13 PROCEDURE — 81001 URINALYSIS AUTO W/SCOPE: CPT | Performed by: NURSE PRACTITIONER

## 2024-06-13 PROCEDURE — 80053 COMPREHEN METABOLIC PANEL: CPT | Performed by: NURSE PRACTITIONER

## 2024-06-13 PROCEDURE — 99284 EMERGENCY DEPT VISIT MOD MDM: CPT

## 2024-06-13 PROCEDURE — 85027 COMPLETE CBC AUTOMATED: CPT | Performed by: NURSE PRACTITIONER

## 2024-06-13 PROCEDURE — 87086 URINE CULTURE/COLONY COUNT: CPT | Mod: PORLAB | Performed by: NURSE PRACTITIONER

## 2024-06-13 PROCEDURE — 36415 COLL VENOUS BLD VENIPUNCTURE: CPT | Performed by: NURSE PRACTITIONER

## 2024-06-13 ASSESSMENT — PAIN DESCRIPTION - LOCATION: LOCATION: PERINEUM

## 2024-06-13 ASSESSMENT — PAIN DESCRIPTION - PAIN TYPE: TYPE: ACUTE PAIN

## 2024-06-13 ASSESSMENT — PAIN DESCRIPTION - FREQUENCY: FREQUENCY: CONSTANT/CONTINUOUS

## 2024-06-13 ASSESSMENT — PAIN - FUNCTIONAL ASSESSMENT: PAIN_FUNCTIONAL_ASSESSMENT: 0-10

## 2024-06-13 ASSESSMENT — PAIN SCALES - GENERAL: PAINLEVEL_OUTOF10: 5 - MODERATE PAIN

## 2024-06-13 ASSESSMENT — PAIN DESCRIPTION - DESCRIPTORS: DESCRIPTORS: BURNING

## 2024-06-14 ENCOUNTER — APPOINTMENT (OUTPATIENT)
Dept: RADIOLOGY | Facility: HOSPITAL | Age: 74
End: 2024-06-14
Payer: MEDICARE

## 2024-06-14 LAB
ALBUMIN SERPL BCP-MCNC: 4.2 G/DL (ref 3.4–5)
ALP SERPL-CCNC: 139 U/L (ref 33–136)
ALT SERPL W P-5'-P-CCNC: 13 U/L (ref 7–45)
ANION GAP SERPL CALC-SCNC: 10 MMOL/L (ref 10–20)
AST SERPL W P-5'-P-CCNC: 19 U/L (ref 9–39)
BILIRUB SERPL-MCNC: 0.5 MG/DL (ref 0–1.2)
BUN SERPL-MCNC: 13 MG/DL (ref 6–23)
CALCIUM SERPL-MCNC: 9.6 MG/DL (ref 8.6–10.3)
CHLORIDE SERPL-SCNC: 102 MMOL/L (ref 98–107)
CO2 SERPL-SCNC: 28 MMOL/L (ref 21–32)
CREAT SERPL-MCNC: 0.78 MG/DL (ref 0.5–1.05)
EGFRCR SERPLBLD CKD-EPI 2021: 81 ML/MIN/1.73M*2
ERYTHROCYTE [DISTWIDTH] IN BLOOD BY AUTOMATED COUNT: 13 % (ref 11.5–14.5)
GLUCOSE SERPL-MCNC: 79 MG/DL (ref 74–99)
HCT VFR BLD AUTO: 45.5 % (ref 36–46)
HGB BLD-MCNC: 15.5 G/DL (ref 12–16)
MCH RBC QN AUTO: 32.4 PG (ref 26–34)
MCHC RBC AUTO-ENTMCNC: 34.1 G/DL (ref 32–36)
MCV RBC AUTO: 95 FL (ref 80–100)
NRBC BLD-RTO: 0 /100 WBCS (ref 0–0)
PLATELET # BLD AUTO: 192 X10*3/UL (ref 150–450)
POTASSIUM SERPL-SCNC: 4 MMOL/L (ref 3.5–5.3)
PROT SERPL-MCNC: 7.4 G/DL (ref 6.4–8.2)
RBC # BLD AUTO: 4.78 X10*6/UL (ref 4–5.2)
SODIUM SERPL-SCNC: 136 MMOL/L (ref 136–145)
WBC # BLD AUTO: 8.6 X10*3/UL (ref 4.4–11.3)

## 2024-06-14 PROCEDURE — 74176 CT ABD & PELVIS W/O CONTRAST: CPT | Mod: FOREIGN READ | Performed by: RADIOLOGY

## 2024-06-14 PROCEDURE — 2500000001 HC RX 250 WO HCPCS SELF ADMINISTERED DRUGS (ALT 637 FOR MEDICARE OP): Performed by: NURSE PRACTITIONER

## 2024-06-14 PROCEDURE — 74176 CT ABD & PELVIS W/O CONTRAST: CPT

## 2024-06-14 RX ORDER — PHENAZOPYRIDINE HYDROCHLORIDE 100 MG/1
95 TABLET, FILM COATED ORAL
Status: DISCONTINUED | OUTPATIENT
Start: 2024-06-14 | End: 2024-06-14 | Stop reason: HOSPADM

## 2024-06-14 RX ORDER — PHENAZOPYRIDINE HYDROCHLORIDE 200 MG/1
200 TABLET, FILM COATED ORAL 3 TIMES DAILY
Qty: 6 TABLET | Refills: 0 | Status: SHIPPED | OUTPATIENT
Start: 2024-06-14 | End: 2024-06-16

## 2024-06-14 NOTE — ED PROVIDER NOTES
[facility]  [facility]  ED  Encounter Note  Admit Date/RoomTime: 2024 11:10 PM  ED Room: STBED/STBED  NAME: Marisabel Richardson  : 1950  MRN: [id number]     Chief Complaint:  Blood in Urine and urinary burning  (Pt states  noticed blood in urine  and burning with urination tonight  )    HISTORY OF PRESENT ILLNESS        Marisabel Richardson is a 74 y.o. female who presents to the ED for evaluation of blood in urine and urinary burning. She denied fever or chills. Hx of kidney stones in the past. Started having symptoms as she was going to bed. Is on Augmentin for a cracked tooth. Last dose of Augmentin will be tomorrow. No vomiting, diarrhea. 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 Denied hx of urinary retention.  + current smoker.     ROS   Pertinent positives and negatives are stated within HPI, all other systems reviewed and are negative.    Past Medical History:  has a past medical history of Anxiety, Colon polyp, Depression, HLD (hyperlipidemia), Hypothyroidism, and Mass of colon.    Surgical History:  has a past surgical history that includes Colonoscopy; Hysterectomy; and Cataract extraction (Bilateral).    Social History:  reports that she has been smoking cigarettes. She has never been exposed to tobacco smoke. She has never used smokeless tobacco. She reports that she does not drink alcohol and does not use drugs.    Family History: family history includes Cancer in her father; Diabetes in her brother, father, and sister; No Known Problems in her mother.     Allergies: Patient has no known allergies.    PHYSICAL EXAM   Oxygen Saturation Interpretation: Normal.     Physical Exam  Constitutional/General: Alert and oriented x3, well appearing, non toxic  HEENT:  NC/NT. PERRLA.  Airway patent.  Neck: Supple, full ROM. No midline vertebral tenderness or crepitus.   Respiratory: Lung sounds clear to  auscultation bilaterally. No wheezes, rhonchi or stridor. Not in respiratory distress.  CV:  Regular rate. Regular rhythm. No murmurs or rubs. 2+ distal pulses.  GI:  Abdomen soft, non-tender, non-distended. +BS. No rebound, guarding, or rigidity. No pulsatile masses.  Musculoskeletal: Moves all extremities x 4. Warm and well perfused. Capillary refill <3 seconds  Integument: Skin warm and dry. No rashes.   Neurologic: Alert and oriented with no focal deficits, symmetric strength 5/5 in the upper and lower extremities bilaterally.  Psychiatric: Normal affect.    Lab / Imaging Results   (All laboratory and radiology results have been personally reviewed by myself)  Labs:  Results for orders placed or performed during the hospital encounter of 06/13/24   Urine culture    Specimen: Clean Catch/Voided; Urine   Result Value Ref Range    Urine Culture No growth    Urinalysis with Reflex Microscopic   Result Value Ref Range    Color, Urine Colorless (N) Light-Yellow, Yellow, Dark-Yellow    Appearance, Urine Clear Clear    Specific Gravity, Urine 1.005 1.005 - 1.035    pH, Urine 5.5 5.0, 5.5, 6.0, 6.5, 7.0, 7.5, 8.0    Protein, Urine NEGATIVE NEGATIVE, 10 (TRACE), 20 (TRACE) mg/dL    Glucose, Urine Normal Normal mg/dL    Blood, Urine 1.0 (3+) (A) NEGATIVE    Ketones, Urine NEGATIVE NEGATIVE mg/dL    Bilirubin, Urine NEGATIVE NEGATIVE    Urobilinogen, Urine Normal Normal mg/dL    Nitrite, Urine NEGATIVE NEGATIVE    Leukocyte Esterase, Urine NEGATIVE NEGATIVE   Microscopic Only, Urine   Result Value Ref Range    WBC, Urine 1-5 1-5, NONE /HPF    RBC, Urine 1-2 NONE, 1-2, 3-5 /HPF    Mucus, Urine FEW Reference range not established. /LPF   CBC   Result Value Ref Range    WBC 8.6 4.4 - 11.3 x10*3/uL    nRBC 0.0 0.0 - 0.0 /100 WBCs    RBC 4.78 4.00 - 5.20 x10*6/uL    Hemoglobin 15.5 12.0 - 16.0 g/dL    Hematocrit 45.5 36.0 - 46.0 %    MCV 95 80 - 100 fL    MCH 32.4 26.0 - 34.0 pg    MCHC 34.1 32.0 - 36.0 g/dL    RDW 13.0 11.5 -  14.5 %    Platelets 192 150 - 450 x10*3/uL   Comprehensive metabolic panel   Result Value Ref Range    Glucose 79 74 - 99 mg/dL    Sodium 136 136 - 145 mmol/L    Potassium 4.0 3.5 - 5.3 mmol/L    Chloride 102 98 - 107 mmol/L    Bicarbonate 28 21 - 32 mmol/L    Anion Gap 10 10 - 20 mmol/L    Urea Nitrogen 13 6 - 23 mg/dL    Creatinine 0.78 0.50 - 1.05 mg/dL    eGFR 81 >60 mL/min/1.73m*2    Calcium 9.6 8.6 - 10.3 mg/dL    Albumin 4.2 3.4 - 5.0 g/dL    Alkaline Phosphatase 139 (H) 33 - 136 U/L    Total Protein 7.4 6.4 - 8.2 g/dL    AST 19 9 - 39 U/L    Bilirubin, Total 0.5 0.0 - 1.2 mg/dL    ALT 13 7 - 45 U/L     Imaging:  All Radiology results interpreted by Radiologist unless otherwise noted.  CT abdomen pelvis wo IV contrast   Final Result   1.Development of right-sided hydronephrosis. No definable renal   calculi.   2.Moderate to large amount fecal debris throughout the colon.   3.Fluid throughout small bowel loops.   4.Bilateral adrenal gland adenomas unchanged.   Signed by Robert Stephens DO          ED Course / Medical Decision Making   Medications - No data to display  ED Course as of 06/16/24 1437   Fri Jun 14, 2024   0127 CT abdomen pelvis wo IV contrast [PK]   0244 This patient was seen by the [facility].  I have personally seen and examined the patient. I made / approved the management plan and take responsibility for patient management. I reviewed and edited the above documentation where necessary.     Patient with hematuria and dysuria tonight.  No associated symptoms.  Blood work is normal.  Urinalysis does show hematuria and is otherwise normal without evidence of infection.  CT scan shows right-sided hydronephrosis without identified cause.    I discussed the results with the patient.  I do believe she is stable for outpatient management.  We discussed follow-up with urology.    Jina Kohler DO  Emergency Medicine   [SP]      ED Course User Index  [PK] Reyna Araujo, APRN-CNP  [SP] Jina Kohler DO          Diagnoses as of 06/16/24 1437   Urinary urgency   Hematuria, unspecified type   Hydronephrosis, unspecified hydronephrosis type       MDM:     Marisabel Richardson is a 74 y.o. female who presents to the ED for evaluation of blood in urine and urinary burning. She denied fever or chills. Hx of kidney stones in the past. Started having symptoms as she was going to bed. Is on Augmentin for a cracked tooth. Last dose of Augmentin will be tomorrow. No vomiting, diarrhea. 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 Denied hx of urinary retention.  + current smoker.     ED course stable  Urine showed + hematuria. No infection noted, is on Augmentin for dental infection. Urine culture pending. Labs cbc cmp at baseline.  Ct scan abd pelvis without contrast showed + stool retention, hx of same, Pyridium 200 mg po x one and continue. Will need referral to urology and pt will call this week.  Return if needed.  Pt did not appear uncomfortable. Tolerated oral fluids, lunch box during ED course.  Ddx: hematuria urgency urgency       Plan of Care/Counseling:  I reviewed today's visit with the patient in addition to providing specific details for the plan of care and counseling regarding the diagnosis and prognosis.  Questions are answered at this time and are agreeable with the plan.    ASSESSMENT     1. Urinary urgency    2. Hematuria, unspecified type    3. Hydronephrosis, unspecified hydronephrosis type      PLAN   Home Tylenol, Referral urology, Advised to return for signs of head injury, weakness, numbness or tingling to extremities, incontinence, and Advised to return for worsening or additional problems such as abdominal or chest pain  Diagnostic tests were reviewed and questions answered. Diagnosis, care plan and treatment options were discussed. The patient understand instructions and will follow up as directed.  The patient was given verbal  follow-up instructions  Transferred to home patient verbally agreed to transfer  Patient condition is good    New Medications     New Medications Ordered This Visit   Medications    phenazopyridine (Pyridium) 200 mg tablet     Sig: Take 1 tablet (200 mg) by mouth 3 times a day for 2 days.     Dispense:  6 tablet     Refill:  0     Electronically signed by JUDITH Alejandro   **This report was transcribed using voice recognition software. Every effort was made to ensure accuracy; however, inadvertent computerized transcription errors may be present.  END OF ED PROVIDER NOTE     JUDITH Alejandro  06/16/24 1437       Jina Kohler DO  06/18/24 1152

## 2024-06-15 LAB — BACTERIA UR CULT: NO GROWTH

## 2024-06-27 ENCOUNTER — HOSPITAL ENCOUNTER (OUTPATIENT)
Dept: RADIOLOGY | Facility: HOSPITAL | Age: 74
Discharge: HOME | End: 2024-06-27
Payer: MEDICARE

## 2024-06-27 DIAGNOSIS — C18.7 MALIGNANT NEOPLASM OF SIGMOID COLON (MULTI): ICD-10-CM

## 2024-06-27 PROCEDURE — 78815 PET IMAGE W/CT SKULL-THIGH: CPT | Mod: PS

## 2024-06-27 PROCEDURE — 3430000001 HC RX 343 DIAGNOSTIC RADIOPHARMACEUTICALS: Performed by: INTERNAL MEDICINE

## 2024-06-27 PROCEDURE — A9552 F18 FDG: HCPCS | Performed by: INTERNAL MEDICINE

## 2024-06-27 RX ORDER — FLUDEOXYGLUCOSE F 18 200 MCI/ML
11.7 INJECTION, SOLUTION INTRAVENOUS
Status: COMPLETED | OUTPATIENT
Start: 2024-06-27 | End: 2024-06-27

## 2024-07-08 ENCOUNTER — APPOINTMENT (OUTPATIENT)
Dept: UROLOGY | Facility: CLINIC | Age: 74
End: 2024-07-08
Payer: MEDICARE

## 2024-07-08 DIAGNOSIS — R31.9 HEMATURIA, UNSPECIFIED TYPE: ICD-10-CM

## 2024-07-08 LAB
POC BILIRUBIN, URINE: ABNORMAL
POC BLOOD, URINE: ABNORMAL
POC GLUCOSE, URINE: NEGATIVE MG/DL
POC KETONES, URINE: ABNORMAL MG/DL
POC LEUKOCYTES, URINE: NEGATIVE
POC NITRITE,URINE: NEGATIVE
POC PH, URINE: 5.5 PH
POC PROTEIN, URINE: ABNORMAL MG/DL
POC SPECIFIC GRAVITY, URINE: >=1.03
POC UROBILINOGEN, URINE: 0.2 EU/DL

## 2024-07-08 PROCEDURE — 99204 OFFICE O/P NEW MOD 45 MIN: CPT | Performed by: UROLOGY

## 2024-07-08 PROCEDURE — 81002 URINALYSIS NONAUTO W/O SCOPE: CPT | Performed by: UROLOGY

## 2024-07-08 NOTE — PROGRESS NOTES
07/08/2024  74-year-old female presents microscopic hematuria    Patient has no nausea, no vomiting, no fever.    CT abdomen and pelvis 6/14/2024  IMPRESSION:  1.Development of right-sided hydronephrosis. No definable renal  calculi.  2.Moderate to large amount fecal debris throughout the colon.  3.Fluid throughout small bowel loops.  4.Bilateral adrenal gland adenomas unchanged.  Signed by Robert Stephens, DO    Impression  Microscopic hematuria  Possible passing kidney stone    Plan  Cystoscopy      Chief Complaint   Patient presents with    Blood in Urine     Patient coming in as a new patient due to Hematuria, she was seen in the emergency room 6/13/2024.  Patient states she feels like she passed the stone while she was at the emergency room, denies pain or discomfort since Er Visit.        Physical Exam     TODAYS LAB RESULTS:  Component  Ref Range & Units 15:21   POC Glucose, Urine  NEGATIVE mg/dl NEGATIVE   POC Bilirubin, Urine  NEGATIVE SMALL (1+) Abnormal    POC Ketones, Urine  NEGATIVE mg/dl TRACE Abnormal    POC Specific Gravity, Urine  1.005 - 1.035 >=1.030   POC Blood, Urine  NEGATIVE MODERATE (2+) Abnormal    POC PH, Urine  No Reference Range Established PH 5.5   POC Protein, Urine  NEGATIVE, 30 (1+) mg/dl 30 (1+)   POC Urobilinogen, Urine  0.2, 1.0 EU/DL 0.2   Poc Nitrite, Urine  NEGATIVE NEGATIVE   POC Leukocytes, Urine  NEGATIVE NEGATIVE       ASSESSMENT&PLAN:      IMPRESSIONS:    6/14/2024 CT Abdomen and Pelvis    1.Development of right-sided hydronephrosis. No definable renal  calculi.  2.Moderate to large amount fecal debris throughout the colon.  3.Fluid throughout small bowel loops.  4.Bilateral adrenal gland adenomas unchanged.

## 2024-07-09 ENCOUNTER — APPOINTMENT (OUTPATIENT)
Dept: HEMATOLOGY/ONCOLOGY | Facility: CLINIC | Age: 74
End: 2024-07-09
Payer: MEDICARE

## 2024-07-17 ENCOUNTER — OFFICE VISIT (OUTPATIENT)
Dept: HEMATOLOGY/ONCOLOGY | Facility: CLINIC | Age: 74
End: 2024-07-17
Payer: MEDICARE

## 2024-07-17 VITALS
DIASTOLIC BLOOD PRESSURE: 86 MMHG | OXYGEN SATURATION: 93 % | SYSTOLIC BLOOD PRESSURE: 135 MMHG | TEMPERATURE: 97.2 F | HEART RATE: 105 BPM | BODY MASS INDEX: 18.35 KG/M2 | RESPIRATION RATE: 16 BRPM | HEIGHT: 69 IN | WEIGHT: 123.9 LBS

## 2024-07-17 DIAGNOSIS — C18.7 MALIGNANT NEOPLASM OF SIGMOID COLON (MULTI): ICD-10-CM

## 2024-07-17 LAB
ALBUMIN SERPL BCP-MCNC: 4.2 G/DL (ref 3.4–5)
ALP SERPL-CCNC: 130 U/L (ref 33–136)
ALT SERPL W P-5'-P-CCNC: 12 U/L (ref 7–45)
ANION GAP SERPL CALC-SCNC: 10 MMOL/L (ref 10–20)
AST SERPL W P-5'-P-CCNC: 16 U/L (ref 9–39)
BASOPHILS # BLD AUTO: 0.03 X10*3/UL (ref 0–0.1)
BASOPHILS NFR BLD AUTO: 0.6 %
BILIRUB SERPL-MCNC: 0.4 MG/DL (ref 0–1.2)
BUN SERPL-MCNC: 14 MG/DL (ref 6–23)
CALCIUM SERPL-MCNC: 9.4 MG/DL (ref 8.6–10.3)
CHLORIDE SERPL-SCNC: 99 MMOL/L (ref 98–107)
CO2 SERPL-SCNC: 30 MMOL/L (ref 21–32)
CREAT SERPL-MCNC: 0.85 MG/DL (ref 0.5–1.05)
EGFRCR SERPLBLD CKD-EPI 2021: 73 ML/MIN/1.73M*2
EOSINOPHIL # BLD AUTO: 0.08 X10*3/UL (ref 0–0.4)
EOSINOPHIL NFR BLD AUTO: 1.6 %
ERYTHROCYTE [DISTWIDTH] IN BLOOD BY AUTOMATED COUNT: 12.5 % (ref 11.5–14.5)
GLUCOSE SERPL-MCNC: 101 MG/DL (ref 74–99)
HCT VFR BLD AUTO: 47.3 % (ref 36–46)
HGB BLD-MCNC: 16.1 G/DL (ref 12–16)
IMM GRANULOCYTES # BLD AUTO: 0 X10*3/UL (ref 0–0.5)
IMM GRANULOCYTES NFR BLD AUTO: 0 % (ref 0–0.9)
LYMPHOCYTES # BLD AUTO: 1.62 X10*3/UL (ref 0.8–3)
LYMPHOCYTES NFR BLD AUTO: 32.7 %
MCH RBC QN AUTO: 31.8 PG (ref 26–34)
MCHC RBC AUTO-ENTMCNC: 34 G/DL (ref 32–36)
MCV RBC AUTO: 93 FL (ref 80–100)
MONOCYTES # BLD AUTO: 0.57 X10*3/UL (ref 0.05–0.8)
MONOCYTES NFR BLD AUTO: 11.5 %
NEUTROPHILS # BLD AUTO: 2.65 X10*3/UL (ref 1.6–5.5)
NEUTROPHILS NFR BLD AUTO: 53.6 %
PLATELET # BLD AUTO: 191 X10*3/UL (ref 150–450)
POTASSIUM SERPL-SCNC: 4.4 MMOL/L (ref 3.5–5.3)
PROT SERPL-MCNC: 7.1 G/DL (ref 6.4–8.2)
RBC # BLD AUTO: 5.07 X10*6/UL (ref 4–5.2)
SODIUM SERPL-SCNC: 135 MMOL/L (ref 136–145)
WBC # BLD AUTO: 5 X10*3/UL (ref 4.4–11.3)

## 2024-07-17 PROCEDURE — 99215 OFFICE O/P EST HI 40 MIN: CPT | Performed by: INTERNAL MEDICINE

## 2024-07-17 PROCEDURE — 3008F BODY MASS INDEX DOCD: CPT | Performed by: INTERNAL MEDICINE

## 2024-07-17 PROCEDURE — 1159F MED LIST DOCD IN RCRD: CPT | Performed by: INTERNAL MEDICINE

## 2024-07-17 PROCEDURE — 85025 COMPLETE CBC W/AUTO DIFF WBC: CPT | Performed by: INTERNAL MEDICINE

## 2024-07-17 PROCEDURE — 82378 CARCINOEMBRYONIC ANTIGEN: CPT | Mod: PORLAB | Performed by: INTERNAL MEDICINE

## 2024-07-17 PROCEDURE — 1126F AMNT PAIN NOTED NONE PRSNT: CPT | Performed by: INTERNAL MEDICINE

## 2024-07-17 PROCEDURE — 80053 COMPREHEN METABOLIC PANEL: CPT | Performed by: INTERNAL MEDICINE

## 2024-07-17 PROCEDURE — 36415 COLL VENOUS BLD VENIPUNCTURE: CPT | Performed by: INTERNAL MEDICINE

## 2024-07-17 PROCEDURE — 1160F RVW MEDS BY RX/DR IN RCRD: CPT | Performed by: INTERNAL MEDICINE

## 2024-07-17 ASSESSMENT — NCCN CANCER DISTRESS MANAGEMENT
NCCN PHYSICAL CONCERNS: 8
NCCN PHYSICAL CONCERNS: 4
NCCN EMOTIONAL CONCERNS: 1

## 2024-07-17 ASSESSMENT — PAIN SCALES - GENERAL: PAINLEVEL: 0-NO PAIN

## 2024-07-17 NOTE — PATIENT INSTRUCTIONS
Follow up visit today with Dr. Gu for history of adenocarcinoma of sigmoid colon diagnosed in 2023.     GI referral for colonoscopy.     Follow up with Dr. Gu in 4 months.

## 2024-07-17 NOTE — PROGRESS NOTES
Patient ID: Marisabel Richardson is a 73 y.o. female.     Diagnoses:   1. 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  Proficient MMR.  2.  A large goiter with a history of hypothyroidism-currently on replacement dose of thyroxine.     Genomic profile: pMM  Treatment     FOLFOX (adjuvant) x 6 cycles.    Started cycle 1 on 1/22/24 send finish on April 8, 2024 6/14/24 , CAT scan of  abdominal pelvis, no recurrent disease, right-sided hydronephrosis, no definable renal calculi  6/27/24 , PET , 1. Status post sigmoid colon tumor resection, no PET evidence of local recurrence in the postsurgical bed. 2. However, there are multiple FDG avid foci in the rectum, indeterminate, physiologic versus locoregional metastases, attentionon follow-up study  Chief complaint:  Resected sigmoid colon adenocarcinoma.     HPI:  ONCOLOGIC HISTORY-  October 2, 2023: Had a colonoscopy in an outside facility when she presented with abdominal pain.  Colonoscopy showed a sigmoid mass.  Biopsy confirmed adenocarcinoma.  She also also had multiple polyps in her colon some of which have been removed.     November 9, 2023: She had a CT scan of chest abdomen pelvis which did not show any distant metastatic disease.  CT scan showed a large goiter.  She tells me that she has an endocrinologist who she follows up with.     November 15, 2023: She underwent colectomy.  Pathology confirmed pT3 pN1 adenocarcinoma without any involvement of the margins.     January 22, 2024: Started first cycle of chemotherapy with FOLFOX.     Interval history:  Patient has a history of sigmoid colon cancer status post resection and adjuvant chemotherapy.     Patient has a history of constipation taking Senokot every day as a loose bowel movement every day.  Also history of nonspecific abdominal pain most probably due to colonic spasm.  Colonoscopy done this year did show colon polyp status post polypectomy.    PET scan result  discussed with the patient no evidence of local recurrent some abnormal activity in the rectum which could be due to polyps versus physiological activity  Patient also developed right hydronephrosis urology evaluation in progress    Review of systems: Negative except weakness, fatigue, tingling numbness of finger and toe, history of constipation nonspecific abdominal pain     Past Medical History:     Past Medical History   Past Medical History:  No date: Anxiety  No date: Colon polyp  No date: Depression  No date: HLD (hyperlipidemia)  No date: Hypothyroidism  No date: Mass of colon      Comment:  sigmoid colon      Surgical History:    Surgical History         Past Surgical History:   Procedure Laterality Date    CATARACT EXTRACTION Bilateral      COLONOSCOPY        HYSTERECTOMY             Family History:    Family History[]Expand by Default          Family History   Problem Relation Name Age of Onset    No Known Problems Mother        Cancer Father        Diabetes Father        Diabetes Sister        Diabetes Brother             Family Oncology History:    Cancer-related family history includes Cancer in her father.  Social History:    Social History            Tobacco Use    Smoking status: Every Day       Packs/day: 1       Types: Cigarettes       Last attempt to quit: 2023       Years since quittin.2       Passive exposure: Never    Smokeless tobacco: Never   Vaping Use    Vaping Use: Never used   Substance Use Topics    Alcohol use: Never    Drug use: Never         Allergies  No Known Allergies   Medications       Current Outpatient Medications   Medication Instructions    amitriptyline (Elavil) 25 mg tablet 2 times daily    atorvastatin (LIPITOR) 20 mg, oral, Daily    hyoscyamine (ANASPAZ) 0.125 mg, oral, Every 4 hours PRN    levothyroxine (SYNTHROID, LEVOXYL) 25 mcg, oral    lidocaine-prilocaine (Emla) 2.5-2.5 % cream Apply to chest site 30-45 minutes prior to port being accessed for use     loperamide (Imodium) 2 mg capsule Take 2 capsules (4 mg) by mouth with the first episode of diarrhea and 1 capsule (2 mg) by mouth with any additional episodes. Maximum 8 capsules (16 mg) per day.    magnesium hydroxide (Milk of Magnesia) 400 mg/5 mL suspension oral, Daily PRN    multivitamin tablet 1 tablet, oral, Daily    ondansetron (ZOFRAN) 8 mg, oral, Every 8 hours PRN    prochlorperazine (COMPAZINE) 10 mg, oral, Every 6 hours PRN    sertraline (ZOLOFT)    Objective []Expand by Default  VS: /79 (BP Location: Right arm, Patient Position: Sitting, BP Cuff Size: Adult)   Pulse 106   Temp 36.1 °C (97 °F) (Temporal)   Resp 18   Wt 56.4 kg (124 lb 7.2 oz)   SpO2 97%   BMI 19.37 kg/m²    PHYSICAL EXAMINATION     Constitutional: Awake/alert/oriented x3, cooperative and answers questions appropriately.      Eyes: No pallor, conjunctival injection, clear sclera.     Mouth: No ulceration. No thrush. Moist/ clear      Respiratory/Thorax: Normal breath sounds with bilaterally symmetrical chest expansion. No dullness.       Cardiovascular: No audible murmurs, normal heart sounds. Palpable pulses      Gastrointestinal: Nondistended, soft, non-tender, no rebound tenderness or guarding, no masses palpable, no organomegaly, +BS, no bruits. No ascites.      Musculoskeletal: No joint swelling, redness.       Extremities: normal extremities, no cyanosis edema, contusions or wounds, no clubbing.      Lymphatic: No significant lymphadenopathy.      Skin: Warm and dry, no lesions, no rashes.   Labs    & Units 13:54  (7/17/24) 1 mo ago  (6/13/24) 3 mo ago  (3/25/24) 4 mo ago  (3/11/24) 4 mo ago  (2/26/24) 5 mo ago  (2/12/24) 5 mo ago  (2/5/24)   WBC  4.4 - 11.3 x10*3/uL 5.0 8.6 3.2 Low  3.7 Low  5.0 3.7 Low  3.3 Low    RBC  4.00 - 5.20 x10*6/uL 5.07 4.78 4.50 4.57 4.50 4.66 4.45   Hemoglobin  12.0 - 16.0 g/dL 16.1 High  15.5 14.4 14.3 14.1 14.6 13.8   Hematocrit  36.0 - 46.0 % 47.3 High  45.5 41.8 42.4 41.8 42.7 40.2    MCV  80 - 100 fL 93 95 93 93 93 92 90   MCH  26.0 - 34.0 pg 31.8 32.4 32.0 31.3 31.3 31.3 31.0   MCHC  32.0 - 36.0 g/dL 34.0 34.1 34.4 33.7 33.7 34.2 34.3   RDW  11.5 - 14.5 % 12.5 13.0 14.5 14.4 13.5 13.1 12.7   Platelets  150 - 450 x10*3/uL 191 192        Radiology result, CAT scan abdominal pelvis and PET scan reviewed and discussed with patient  Assessment and Plan:  This is a pleasant woman who presented with a diagnosis of localized sigmoid colon adenocarcinoma.  She had a colonoscopy in October 2023 when she had abdominal pain.  Colonoscopy revealed multiple colonic polyps and a sigmoid colon mass.  Biopsy confirmed adenocarcinoma, proficient MMR.  Subsequently on November 15, 2023 she underwent colectomy by Dr. Lev Stanton.  She recovered well from the surgery.     We discussed the adjuvant treatment plan with her.  We recommended adjuvant chemotherapy based on the current NCCN guidelines since she has involvement of the lymph node.  We recommended FOLFOX for 6 cycles.  Discussed the chemotherapy regimen in detail. She had her mediport placed on 1/18/24. Started cycle 1 on 1/22/24.     7/17/24  #1 stage III sigmoid colon cancer status post resection and adjuvant chemotherapy, FOLFOX x 6    2.  Slight abnormal activity in the rectum on the basis of recent PET scan    3.  Right hydronephrosis nephrosis  #4 erythrocytosis possible due to smoking  Plan: PET scan result discussed with the patient personally I do not believe we are dealing with active metastatic disease.  For further evaluation I will schedule for colonoscopy.    Patient also is diagnosed with right hydronephrosis, will be evaluated by urology.    Follow-up after 4 months.    Plan discussed with patient.    Patient is also advised not taking more Senokot try to use stool bulking medicine like Metamucil for constipation increase fiber intake.         Time spent

## 2024-07-18 LAB — CEA SERPL-MCNC: 4.9 UG/L

## 2024-07-19 ENCOUNTER — TELEPHONE (OUTPATIENT)
Dept: HEMATOLOGY/ONCOLOGY | Facility: CLINIC | Age: 74
End: 2024-07-19
Payer: MEDICARE

## 2024-07-19 ENCOUNTER — TELEPHONE (OUTPATIENT)
Dept: GASTROENTEROLOGY | Facility: CLINIC | Age: 74
End: 2024-07-19
Payer: MEDICARE

## 2024-07-19 NOTE — TELEPHONE ENCOUNTER
----- Message from Lyudmila SANCHEZ sent at 7/19/2024  2:55 PM EDT -----  Regarding: RE: Open access  Patient requested we let Dr. Gu know of the recommendation to [facility] from Dr. Nichole. Patient's referral has been updated to [facility]  ----- Message -----  From: Lyudmila Spears MA  Sent: 7/19/2024   1:31 PM EDT  To: Maren Norman RN; #  Subject: RE: Open access                                  PATIENT NEEDS TO GO TO [facility] PER DR. NICHOLE AFTER LOOKING OVER [facility] PAPERWORK AND SPEAKING TO ISHMAEL NDIAYE FOR THE TWO POLYPS THAT COULD NOT BE REMOVED AT THE LAST COLONOSCOPY IN 4/2024    Left message on machine to return call  ----- Message -----  From: Maren Norman RN  Sent: 7/19/2024  11:02 AM EDT  To:  [id number]er[facility]  Subject: Open access                                      Open access

## 2024-07-19 NOTE — TELEPHONE ENCOUNTER
Patient had her follow up appointment with Dr. Gu.  She scored a 7 on her distress screen.  Patient identified physical concerns and emotional concerns-worry or anxiety.  SW called patient and left a brief message on her identified voicemail.  SW provided phone number if SW could help in anyway.      Yevgeniy Judge MSW, LSW

## 2024-07-25 ENCOUNTER — APPOINTMENT (OUTPATIENT)
Dept: HEMATOLOGY/ONCOLOGY | Facility: CLINIC | Age: 74
End: 2024-07-25
Payer: MEDICARE

## 2024-08-06 ENCOUNTER — APPOINTMENT (OUTPATIENT)
Dept: PRIMARY CARE | Facility: CLINIC | Age: 74
End: 2024-08-06
Payer: MEDICARE

## 2024-08-06 ENCOUNTER — OFFICE VISIT (OUTPATIENT)
Dept: GASTROENTEROLOGY | Facility: CLINIC | Age: 74
End: 2024-08-06
Payer: MEDICARE

## 2024-08-06 VITALS
OXYGEN SATURATION: 97 % | SYSTOLIC BLOOD PRESSURE: 138 MMHG | DIASTOLIC BLOOD PRESSURE: 70 MMHG | HEIGHT: 69 IN | HEART RATE: 88 BPM | BODY MASS INDEX: 18.54 KG/M2 | WEIGHT: 125.2 LBS

## 2024-08-06 VITALS
BODY MASS INDEX: 17.92 KG/M2 | SYSTOLIC BLOOD PRESSURE: 134 MMHG | HEART RATE: 97 BPM | DIASTOLIC BLOOD PRESSURE: 71 MMHG | WEIGHT: 121 LBS | TEMPERATURE: 98 F | HEIGHT: 69 IN

## 2024-08-06 DIAGNOSIS — F03.90 DEMENTIA WITHOUT BEHAVIORAL DISTURBANCE, PSYCHOTIC DISTURBANCE, MOOD DISTURBANCE, OR ANXIETY, UNSPECIFIED DEMENTIA SEVERITY, UNSPECIFIED DEMENTIA TYPE (MULTI): ICD-10-CM

## 2024-08-06 DIAGNOSIS — E46 PROTEIN-CALORIE MALNUTRITION, UNSPECIFIED SEVERITY (MULTI): ICD-10-CM

## 2024-08-06 DIAGNOSIS — Z00.00 ROUTINE GENERAL MEDICAL EXAMINATION AT HEALTH CARE FACILITY: Primary | ICD-10-CM

## 2024-08-06 DIAGNOSIS — E78.2 MIXED HYPERLIPIDEMIA: ICD-10-CM

## 2024-08-06 DIAGNOSIS — E03.9 ACQUIRED HYPOTHYROIDISM: ICD-10-CM

## 2024-08-06 DIAGNOSIS — K58.1 IRRITABLE BOWEL SYNDROME WITH CONSTIPATION: Primary | ICD-10-CM

## 2024-08-06 DIAGNOSIS — C18.7 MALIGNANT NEOPLASM OF SIGMOID COLON (MULTI): ICD-10-CM

## 2024-08-06 DIAGNOSIS — Z13.6 SCREENING FOR CARDIOVASCULAR CONDITION: ICD-10-CM

## 2024-08-06 DIAGNOSIS — F32.4 MAJOR DEPRESSIVE DISORDER WITH SINGLE EPISODE, IN PARTIAL REMISSION (CMS-HCC): ICD-10-CM

## 2024-08-06 DIAGNOSIS — C77.2 SECONDARY AND UNSPECIFIED MALIGNANT NEOPLASM OF INTRA-ABDOMINAL LYMPH NODES (MULTI): ICD-10-CM

## 2024-08-06 PROCEDURE — 99212 OFFICE O/P EST SF 10 MIN: CPT | Performed by: INTERNAL MEDICINE

## 2024-08-06 PROCEDURE — 3008F BODY MASS INDEX DOCD: CPT | Performed by: INTERNAL MEDICINE

## 2024-08-06 PROCEDURE — 1158F ADVNC CARE PLAN TLK DOCD: CPT | Performed by: FAMILY MEDICINE

## 2024-08-06 PROCEDURE — 1159F MED LIST DOCD IN RCRD: CPT | Performed by: FAMILY MEDICINE

## 2024-08-06 PROCEDURE — 1159F MED LIST DOCD IN RCRD: CPT | Performed by: INTERNAL MEDICINE

## 2024-08-06 PROCEDURE — 3008F BODY MASS INDEX DOCD: CPT | Performed by: FAMILY MEDICINE

## 2024-08-06 PROCEDURE — G0439 PPPS, SUBSEQ VISIT: HCPCS | Performed by: FAMILY MEDICINE

## 2024-08-06 PROCEDURE — 4004F PT TOBACCO SCREEN RCVD TLK: CPT | Performed by: INTERNAL MEDICINE

## 2024-08-06 PROCEDURE — 1170F FXNL STATUS ASSESSED: CPT | Performed by: FAMILY MEDICINE

## 2024-08-06 PROCEDURE — 1123F ACP DISCUSS/DSCN MKR DOCD: CPT | Performed by: INTERNAL MEDICINE

## 2024-08-06 PROCEDURE — 4004F PT TOBACCO SCREEN RCVD TLK: CPT | Performed by: FAMILY MEDICINE

## 2024-08-06 PROCEDURE — 1160F RVW MEDS BY RX/DR IN RCRD: CPT | Performed by: FAMILY MEDICINE

## 2024-08-06 PROCEDURE — 99202 OFFICE O/P NEW SF 15 MIN: CPT | Performed by: INTERNAL MEDICINE

## 2024-08-06 PROCEDURE — 1123F ACP DISCUSS/DSCN MKR DOCD: CPT | Performed by: FAMILY MEDICINE

## 2024-08-06 RX ORDER — HYOSCYAMINE SULFATE 0.12 MG/1
0.12 TABLET, ORALLY DISINTEGRATING ORAL EVERY 4 HOURS PRN
Qty: 180 TABLET | Refills: 1 | Status: SHIPPED | OUTPATIENT
Start: 2024-08-06 | End: 2024-10-05

## 2024-08-06 RX ORDER — SIMVASTATIN 40 MG/1
1 TABLET, FILM COATED ORAL
COMMUNITY
Start: 2024-04-23

## 2024-08-06 RX ORDER — LEVOTHYROXINE SODIUM 25 UG/1
25 TABLET ORAL DAILY
Qty: 90 TABLET | Refills: 3 | Status: SHIPPED | OUTPATIENT
Start: 2024-08-06 | End: 2025-08-06

## 2024-08-06 RX ORDER — SENNOSIDES 8.6 MG/1
3 TABLET ORAL DAILY
COMMUNITY

## 2024-08-06 RX ORDER — ATORVASTATIN CALCIUM 20 MG/1
20 TABLET, FILM COATED ORAL DAILY
Qty: 90 TABLET | Refills: 3 | Status: SHIPPED | OUTPATIENT
Start: 2024-08-06 | End: 2025-08-06

## 2024-08-06 RX ORDER — SERTRALINE HYDROCHLORIDE 100 MG/1
100 TABLET, FILM COATED ORAL DAILY
Qty: 90 TABLET | Refills: 3 | Status: SHIPPED | OUTPATIENT
Start: 2024-08-06 | End: 2025-08-06

## 2024-08-06 ASSESSMENT — ENCOUNTER SYMPTOMS
OCCASIONAL FEELINGS OF UNSTEADINESS: 0
LOSS OF SENSATION IN FEET: 0
DEPRESSION: 0

## 2024-08-06 ASSESSMENT — ACTIVITIES OF DAILY LIVING (ADL)
DRESSING: INDEPENDENT
MANAGING_FINANCES: INDEPENDENT
GROCERY_SHOPPING: INDEPENDENT
BATHING: INDEPENDENT
TAKING_MEDICATION: INDEPENDENT
DOING_HOUSEWORK: INDEPENDENT

## 2024-08-06 ASSESSMENT — PATIENT HEALTH QUESTIONNAIRE - PHQ9
1. LITTLE INTEREST OR PLEASURE IN DOING THINGS: NOT AT ALL
SUM OF ALL RESPONSES TO PHQ9 QUESTIONS 1 AND 2: 0
2. FEELING DOWN, DEPRESSED OR HOPELESS: NOT AT ALL

## 2024-08-06 NOTE — PATIENT INSTRUCTIONS
We reviewed your last colonoscopy and I recommend a repeat colonoscopy in 1 year.    Please restart your psyllium fiber once a day.     There was a quick discussion about the link between tobacco and colon polyps/cancer.

## 2024-08-06 NOTE — PATIENT INSTRUCTIONS
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

## 2024-08-06 NOTE — PROGRESS NOTES
"Subjective   Reason for Visit: Marisabel Richardson is an 74 y.o. female here for a Medicare Wellness visit.      74 year old female to establish. She is from [place]. New patient to our practice. She had colonoscopy done 4/2024 and had polyps and needs to have them removed. Is undergoing chemo and could not do last session.   Mammogram and bone scan in the last three years.   Will see Dr. Resendiz  Reviewed all medications by prescribing practitioner or clinical pharmacist (such as prescriptions, OTCs, herbal therapies and supplements) and documented in the medical record.    Patient Care Team:  Yolis Valencia MD as PCP - General (Family Medicine)  Dodie Odom MD as Consulting Physician (Hematology and Oncology)  Bonilla Gu MD as Consulting Physician (Hematology and Oncology)     Review of Systems   All other systems reviewed and are negative.      Objective   Vitals:  /70   Pulse 88   Ht 1.753 m (5' 9.02\")   Wt 56.8 kg (125 lb 3.2 oz)   SpO2 97%   BMI 18.48 kg/m²       Physical Exam  Vitals reviewed.   Constitutional:       Appearance: Normal appearance.   HENT:      Head: Normocephalic and atraumatic.      Right Ear: Tympanic membrane normal.      Left Ear: Tympanic membrane normal.      Nose: Nose normal.      Mouth/Throat:      Mouth: Mucous membranes are moist.      Pharynx: Oropharynx is clear.      Comments: Poor dentition  Eyes:      Extraocular Movements: Extraocular movements intact.      Conjunctiva/sclera: Conjunctivae normal.      Pupils: Pupils are equal, round, and reactive to light.   Cardiovascular:      Rate and Rhythm: Normal rate and regular rhythm.      Pulses: Normal pulses.      Heart sounds: Normal heart sounds.   Pulmonary:      Effort: Pulmonary effort is normal.      Breath sounds: Normal breath sounds.   Abdominal:      General: Abdomen is flat. Bowel sounds are normal.      Palpations: Abdomen is soft.   Musculoskeletal:         General: Normal range of motion.      " Cervical back: Normal range of motion and neck supple.   Skin:     General: Skin is warm and dry.      Capillary Refill: Capillary refill takes less than 2 seconds.   Neurological:      General: No focal deficit present.      Mental Status: She is alert and oriented to person, place, and time.   Psychiatric:         Mood and Affect: Mood normal.         Behavior: Behavior normal.         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 Gu.  Has appointment today with Dr. Dobbs for colonoscopy to obtain biopsy of polyps found in the rectum  Protein-calorie malnutrition, unspecified severity (Multi) E46    Major depressive disorder with single episode, in partial remission (CMS-HCC) F32.4    Relevant Medications-stable and controlled    sertraline (Zoloft) 100 mg tablet    Other Relevant Orders    Follow Up In [facility] - PCP - Established     Other Visit Diagnoses         Codes    Routine general medical examination at health care facility    -  Primary Z00.00     (Multi)     F03.90    Screening for cardiovascular condition     Z13.6    Relevant Orders    CT cardiac scoring wo IV contrast        Recheck six months.   Thank you for choosing the providers at [facility]. We hope to satisfy all of your health care needs.

## 2024-08-06 NOTE — PROGRESS NOTES
Subjective   Patient ID: Marisabel Richardson is a 74 y.o. female who presents for New Patient Visit.  Referred by Dr. 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  Proficient MMR    PET scan noted update in the rectum - this was followed up by a colonoscopy by Dr. Bloom in April 2024  Planning on repeat colonoscopy in 3 years based on last exam with 6 polyps; could not retroflexion due to spasm        Review of Systems    Objective   Physical Exam  Constitutional:       Appearance: Normal appearance.   Pulmonary:      Effort: Pulmonary effort is normal.   Abdominal:      General: Abdomen is flat.      Palpations: Abdomen is soft.   Neurological:      Mental Status: She is alert.   Psychiatric:         Mood and Affect: Mood normal.         Behavior: Behavior normal.         Thought Content: Thought content normal.         Judgment: Judgment normal.         Assessment/Plan   Problem List Items Addressed This Visit             ICD-10-CM    Malignant neoplasm of colon (Multi) C18.9            Braulio Resendiz DO 08/06/24 3:33 PM

## 2024-08-07 ENCOUNTER — TELEPHONE (OUTPATIENT)
Dept: PRIMARY CARE | Facility: CLINIC | Age: 74
End: 2024-08-07
Payer: MEDICARE

## 2024-08-07 DIAGNOSIS — F17.210 CIGARETTE NICOTINE DEPENDENCE WITHOUT COMPLICATION: Primary | ICD-10-CM

## 2024-08-07 NOTE — TELEPHONE ENCOUNTER
Per call to her to schedule her CT Cardiac scoring she stated that they will be gone from Nov 1st to April Please advise and CT cardiac are scheduling out til Dec for [facility] which is where she asked to schedule at.

## 2024-08-08 ENCOUNTER — TELEPHONE (OUTPATIENT)
Dept: PRIMARY CARE | Facility: CLINIC | Age: 74
End: 2024-08-08
Payer: MEDICARE

## 2024-08-08 NOTE — TELEPHONE ENCOUNTER
Paperwork received for a change in medication for PATEL Lazo submitted in [facility]MM as MD would like him to stay on medication awaiting determination

## 2024-08-13 ENCOUNTER — LAB (OUTPATIENT)
Dept: LAB | Facility: LAB | Age: 74
End: 2024-08-13
Payer: MEDICARE

## 2024-08-13 DIAGNOSIS — E78.2 MIXED HYPERLIPIDEMIA: ICD-10-CM

## 2024-08-13 DIAGNOSIS — E03.9 ACQUIRED HYPOTHYROIDISM: ICD-10-CM

## 2024-08-13 LAB
CHOLEST SERPL-MCNC: 244 MG/DL (ref 0–199)
CHOLESTEROL/HDL RATIO: 4.8
HDLC SERPL-MCNC: 50.4 MG/DL
LDLC SERPL CALC-MCNC: 165 MG/DL
NON HDL CHOLESTEROL: 194 MG/DL (ref 0–149)
T4 FREE SERPL-MCNC: 0.83 NG/DL (ref 0.61–1.12)
TRIGL SERPL-MCNC: 141 MG/DL (ref 0–149)
TSH SERPL-ACNC: 0.01 MIU/L (ref 0.44–3.98)
VLDL: 28 MG/DL (ref 0–40)

## 2024-08-13 PROCEDURE — 84443 ASSAY THYROID STIM HORMONE: CPT

## 2024-08-13 PROCEDURE — 84439 ASSAY OF FREE THYROXINE: CPT

## 2024-08-13 PROCEDURE — 36415 COLL VENOUS BLD VENIPUNCTURE: CPT

## 2024-08-13 PROCEDURE — 80061 LIPID PANEL: CPT

## 2024-08-16 NOTE — TELEPHONE ENCOUNTER
I do not see the Bentyl script,Patient called for notification of denial and had to leave a voicemail,  Please send script

## 2024-08-24 ENCOUNTER — HOSPITAL ENCOUNTER (OUTPATIENT)
Dept: RADIOLOGY | Facility: HOSPITAL | Age: 74
Discharge: HOME | End: 2024-08-24
Payer: MEDICARE

## 2024-08-24 DIAGNOSIS — F17.210 CIGARETTE NICOTINE DEPENDENCE WITHOUT COMPLICATION: ICD-10-CM

## 2024-08-24 PROCEDURE — 71271 CT THORAX LUNG CANCER SCR C-: CPT

## 2024-09-02 PROBLEM — E01.0 THYROMEGALY: Status: ACTIVE | Noted: 2024-09-02

## 2024-09-17 NOTE — TELEPHONE ENCOUNTER
Post op call. She is s/p 11/15/23 LX Sigmoid colectomy.  She reports that she is in excruciating pain.  Pain is the same as when she was in the hospital.  She is able to complete ADL's.  She is having waves of pain.  She is taking 1 Oxycodone every six hours.  Not taking Tylenol or Ibuprofen.  She is passing gas.  She is moving her bowels once per day.  Stool consistence is like pudding. Denies rectal bleeding.  She is eating without bloating, nausea, or vomiting.  She denies fever/chills.  I discussed with Dr. Brothers.  He has approved Tramadol.  I also instructed her to take 650 mg Tylenol every six hours and 600 mg of Ibuprofen with food every six hours.  This will help with the pain control.  She can try Gas X if she needs it for gas pains.  Patient verbalized understanding and agrees.  
Negative

## 2024-09-20 DIAGNOSIS — K58.1 IRRITABLE BOWEL SYNDROME WITH CONSTIPATION: ICD-10-CM

## 2024-09-20 RX ORDER — HYOSCYAMINE SULFATE 0.12 MG/1
0.12 TABLET, ORALLY DISINTEGRATING ORAL EVERY 4 HOURS PRN
Qty: 180 TABLET | Refills: 1 | Status: SHIPPED | OUTPATIENT
Start: 2024-09-20 | End: 2024-11-19

## 2024-09-20 NOTE — TELEPHONE ENCOUNTER
Patient called and  stated she needs a refill but the insurance said she can not have refills on the anaspaz and she wondering if there is another medicine like it she can take

## 2024-09-24 RX ORDER — DICYCLOMINE HYDROCHLORIDE 20 MG/1
20 TABLET ORAL 4 TIMES DAILY PRN
Qty: 120 TABLET | Refills: 2 | Status: SHIPPED | OUTPATIENT
Start: 2024-09-24 | End: 2024-12-23

## 2024-10-30 ENCOUNTER — OFFICE VISIT (OUTPATIENT)
Dept: HEMATOLOGY/ONCOLOGY | Facility: CLINIC | Age: 74
End: 2024-10-30
Payer: MEDICARE

## 2024-10-30 VITALS
BODY MASS INDEX: 20.03 KG/M2 | HEART RATE: 93 BPM | WEIGHT: 127.65 LBS | TEMPERATURE: 98.1 F | SYSTOLIC BLOOD PRESSURE: 140 MMHG | HEIGHT: 67 IN | RESPIRATION RATE: 16 BRPM | OXYGEN SATURATION: 99 % | DIASTOLIC BLOOD PRESSURE: 90 MMHG

## 2024-10-30 DIAGNOSIS — C18.7 MALIGNANT NEOPLASM OF SIGMOID COLON (MULTI): ICD-10-CM

## 2024-10-30 LAB
ALBUMIN SERPL BCP-MCNC: 4.2 G/DL (ref 3.4–5)
ALP SERPL-CCNC: 114 U/L (ref 33–136)
ALT SERPL W P-5'-P-CCNC: 10 U/L (ref 7–45)
ANION GAP SERPL CALC-SCNC: 11 MMOL/L (ref 10–20)
AST SERPL W P-5'-P-CCNC: 15 U/L (ref 9–39)
BASOPHILS # BLD AUTO: 0.03 X10*3/UL (ref 0–0.1)
BASOPHILS NFR BLD AUTO: 0.5 %
BILIRUB SERPL-MCNC: 0.5 MG/DL (ref 0–1.2)
BUN SERPL-MCNC: 15 MG/DL (ref 6–23)
CALCIUM SERPL-MCNC: 9.1 MG/DL (ref 8.6–10.3)
CEA SERPL-MCNC: 5.7 UG/L
CHLORIDE SERPL-SCNC: 102 MMOL/L (ref 98–107)
CO2 SERPL-SCNC: 28 MMOL/L (ref 21–32)
CREAT SERPL-MCNC: 0.89 MG/DL (ref 0.5–1.05)
EGFRCR SERPLBLD CKD-EPI 2021: 69 ML/MIN/1.73M*2
EOSINOPHIL # BLD AUTO: 0.14 X10*3/UL (ref 0–0.4)
EOSINOPHIL NFR BLD AUTO: 2.5 %
ERYTHROCYTE [DISTWIDTH] IN BLOOD BY AUTOMATED COUNT: 12.8 % (ref 11.5–14.5)
GLUCOSE SERPL-MCNC: 138 MG/DL (ref 74–99)
HCT VFR BLD AUTO: 47.2 % (ref 36–46)
HGB BLD-MCNC: 15.7 G/DL (ref 12–16)
IMM GRANULOCYTES # BLD AUTO: 0 X10*3/UL (ref 0–0.5)
IMM GRANULOCYTES NFR BLD AUTO: 0 % (ref 0–0.9)
LYMPHOCYTES # BLD AUTO: 1.79 X10*3/UL (ref 0.8–3)
LYMPHOCYTES NFR BLD AUTO: 32.4 %
MCH RBC QN AUTO: 32 PG (ref 26–34)
MCHC RBC AUTO-ENTMCNC: 33.3 G/DL (ref 32–36)
MCV RBC AUTO: 96 FL (ref 80–100)
MONOCYTES # BLD AUTO: 0.57 X10*3/UL (ref 0.05–0.8)
MONOCYTES NFR BLD AUTO: 10.3 %
NEUTROPHILS # BLD AUTO: 2.99 X10*3/UL (ref 1.6–5.5)
NEUTROPHILS NFR BLD AUTO: 54.3 %
PLATELET # BLD AUTO: 181 X10*3/UL (ref 150–450)
POTASSIUM SERPL-SCNC: 4.3 MMOL/L (ref 3.5–5.3)
PROT SERPL-MCNC: 7.2 G/DL (ref 6.4–8.2)
RBC # BLD AUTO: 4.9 X10*6/UL (ref 4–5.2)
SODIUM SERPL-SCNC: 137 MMOL/L (ref 136–145)
WBC # BLD AUTO: 5.5 X10*3/UL (ref 4.4–11.3)

## 2024-10-30 PROCEDURE — 84075 ASSAY ALKALINE PHOSPHATASE: CPT | Performed by: INTERNAL MEDICINE

## 2024-10-30 PROCEDURE — 3008F BODY MASS INDEX DOCD: CPT | Performed by: INTERNAL MEDICINE

## 2024-10-30 PROCEDURE — 1160F RVW MEDS BY RX/DR IN RCRD: CPT | Performed by: INTERNAL MEDICINE

## 2024-10-30 PROCEDURE — 1126F AMNT PAIN NOTED NONE PRSNT: CPT | Performed by: INTERNAL MEDICINE

## 2024-10-30 PROCEDURE — 85025 COMPLETE CBC W/AUTO DIFF WBC: CPT | Performed by: INTERNAL MEDICINE

## 2024-10-30 PROCEDURE — 1159F MED LIST DOCD IN RCRD: CPT | Performed by: INTERNAL MEDICINE

## 2024-10-30 PROCEDURE — 99214 OFFICE O/P EST MOD 30 MIN: CPT | Performed by: INTERNAL MEDICINE

## 2024-10-30 PROCEDURE — 1123F ACP DISCUSS/DSCN MKR DOCD: CPT | Performed by: INTERNAL MEDICINE

## 2024-10-30 PROCEDURE — 36415 COLL VENOUS BLD VENIPUNCTURE: CPT | Performed by: INTERNAL MEDICINE

## 2024-10-30 PROCEDURE — 82378 CARCINOEMBRYONIC ANTIGEN: CPT | Mod: PORLAB | Performed by: INTERNAL MEDICINE

## 2024-10-30 ASSESSMENT — PAIN SCALES - GENERAL: PAINLEVEL_OUTOF10: 0-NO PAIN

## 2024-10-31 ENCOUNTER — TELEPHONE (OUTPATIENT)
Dept: HEMATOLOGY/ONCOLOGY | Facility: CLINIC | Age: 74
End: 2024-10-31
Payer: MEDICARE

## 2024-10-31 DIAGNOSIS — C18.6 MALIGNANT NEOPLASM OF DESCENDING COLON (MULTI): Primary | ICD-10-CM

## 2024-12-06 ENCOUNTER — TELEPHONE (OUTPATIENT)
Dept: PRIMARY CARE | Facility: CLINIC | Age: 74
End: 2024-12-06
Payer: MEDICARE

## 2025-01-08 DIAGNOSIS — Z85.038 PERSONAL HISTORY OF COLON CANCER, STAGE III: Primary | ICD-10-CM

## 2025-01-08 RX ORDER — SODIUM, POTASSIUM,MAG SULFATES 17.5-3.13G
0.51 SOLUTION, RECONSTITUTED, ORAL ORAL SEE ADMIN INSTRUCTIONS
Qty: 354 ML | Refills: 0 | Status: SHIPPED | OUTPATIENT
Start: 2025-01-08

## 2025-04-09 DIAGNOSIS — C18.7 MALIGNANT NEOPLASM OF SIGMOID COLON (MULTI): Primary | ICD-10-CM

## 2025-04-09 NOTE — PROGRESS NOTES
Received a denial from [employer] for the PET ordered by dr. Gu. Spoke with dr. Gu, he stated he would order a CT C/A/P  Will have PAS schedule once order placed

## 2025-04-14 ENCOUNTER — APPOINTMENT (OUTPATIENT)
Dept: PRIMARY CARE | Facility: CLINIC | Age: 75
End: 2025-04-14
Payer: MEDICARE

## 2025-04-14 ENCOUNTER — TELEPHONE (OUTPATIENT)
Dept: PRIMARY CARE | Facility: CLINIC | Age: 75
End: 2025-04-14

## 2025-04-14 ENCOUNTER — LAB (OUTPATIENT)
Dept: LAB | Facility: HOSPITAL | Age: 75
End: 2025-04-14
Payer: MEDICARE

## 2025-04-14 VITALS
SYSTOLIC BLOOD PRESSURE: 122 MMHG | HEIGHT: 67 IN | RESPIRATION RATE: 16 BRPM | BODY MASS INDEX: 19.93 KG/M2 | DIASTOLIC BLOOD PRESSURE: 72 MMHG | WEIGHT: 127 LBS

## 2025-04-14 DIAGNOSIS — R10.9 ABDOMINAL CRAMPING: ICD-10-CM

## 2025-04-14 DIAGNOSIS — C77.2 SECONDARY AND UNSPECIFIED MALIGNANT NEOPLASM OF INTRA-ABDOMINAL LYMPH NODES (MULTI): ICD-10-CM

## 2025-04-14 DIAGNOSIS — F17.210 CIGARETTE NICOTINE DEPENDENCE WITHOUT COMPLICATION: ICD-10-CM

## 2025-04-14 DIAGNOSIS — E01.0 THYROMEGALY: ICD-10-CM

## 2025-04-14 DIAGNOSIS — Z00.00 ROUTINE GENERAL MEDICAL EXAMINATION AT HEALTH CARE FACILITY: Primary | ICD-10-CM

## 2025-04-14 DIAGNOSIS — F32.4 MAJOR DEPRESSIVE DISORDER WITH SINGLE EPISODE, IN PARTIAL REMISSION (CMS-HCC): ICD-10-CM

## 2025-04-14 DIAGNOSIS — C18.7 MALIGNANT NEOPLASM OF SIGMOID COLON (MULTI): ICD-10-CM

## 2025-04-14 DIAGNOSIS — E03.9 ACQUIRED HYPOTHYROIDISM: ICD-10-CM

## 2025-04-14 DIAGNOSIS — E78.2 MIXED HYPERLIPIDEMIA: ICD-10-CM

## 2025-04-14 PROBLEM — E03.2 HYPOTHYROIDISM DUE TO NON-MEDICATION EXOGENOUS SUBSTANCES: Status: ACTIVE | Noted: 2017-11-01

## 2025-04-14 PROBLEM — E78.9 LIPID DISORDER: Status: ACTIVE | Noted: 2017-11-01

## 2025-04-14 PROBLEM — F03.90 DEMENTIA WITHOUT BEHAVIORAL DISTURBANCE, PSYCHOTIC DISTURBANCE, MOOD DISTURBANCE, OR ANXIETY, UNSPECIFIED DEMENTIA SEVERITY, UNSPECIFIED DEMENTIA TYPE: Status: ACTIVE | Noted: 2025-04-14

## 2025-04-14 LAB
ALBUMIN SERPL BCP-MCNC: 4.2 G/DL (ref 3.4–5)
ALP SERPL-CCNC: 91 U/L (ref 33–136)
ALT SERPL W P-5'-P-CCNC: 10 U/L (ref 7–45)
ANION GAP SERPL CALC-SCNC: 8 MMOL/L (ref 10–20)
AST SERPL W P-5'-P-CCNC: 15 U/L (ref 9–39)
BASOPHILS # BLD AUTO: 0.03 X10*3/UL (ref 0–0.1)
BASOPHILS NFR BLD AUTO: 0.6 %
BILIRUB SERPL-MCNC: 0.4 MG/DL (ref 0–1.2)
BUN SERPL-MCNC: 15 MG/DL (ref 6–23)
CALCIUM SERPL-MCNC: 9.2 MG/DL (ref 8.6–10.3)
CEA SERPL-MCNC: 6 UG/L
CHLORIDE SERPL-SCNC: 105 MMOL/L (ref 98–107)
CO2 SERPL-SCNC: 28 MMOL/L (ref 21–32)
CREAT SERPL-MCNC: 0.77 MG/DL (ref 0.5–1.05)
EGFRCR SERPLBLD CKD-EPI 2021: 82 ML/MIN/1.73M*2
EOSINOPHIL # BLD AUTO: 0.13 X10*3/UL (ref 0–0.4)
EOSINOPHIL NFR BLD AUTO: 2.4 %
ERYTHROCYTE [DISTWIDTH] IN BLOOD BY AUTOMATED COUNT: 12.8 % (ref 11.5–14.5)
GLUCOSE SERPL-MCNC: 87 MG/DL (ref 74–99)
HCT VFR BLD AUTO: 45.8 % (ref 36–46)
HGB BLD-MCNC: 15.1 G/DL (ref 12–16)
IMM GRANULOCYTES # BLD AUTO: 0.01 X10*3/UL (ref 0–0.5)
IMM GRANULOCYTES NFR BLD AUTO: 0.2 % (ref 0–0.9)
LYMPHOCYTES # BLD AUTO: 1.26 X10*3/UL (ref 0.8–3)
LYMPHOCYTES NFR BLD AUTO: 23.2 %
MCH RBC QN AUTO: 31.7 PG (ref 26–34)
MCHC RBC AUTO-ENTMCNC: 33 G/DL (ref 32–36)
MCV RBC AUTO: 96 FL (ref 80–100)
MONOCYTES # BLD AUTO: 0.6 X10*3/UL (ref 0.05–0.8)
MONOCYTES NFR BLD AUTO: 11 %
NEUTROPHILS # BLD AUTO: 3.4 X10*3/UL (ref 1.6–5.5)
NEUTROPHILS NFR BLD AUTO: 62.6 %
PLATELET # BLD AUTO: 191 X10*3/UL (ref 150–450)
POTASSIUM SERPL-SCNC: 4.3 MMOL/L (ref 3.5–5.3)
PROT SERPL-MCNC: 7.1 G/DL (ref 6.4–8.2)
RBC # BLD AUTO: 4.76 X10*6/UL (ref 4–5.2)
SODIUM SERPL-SCNC: 137 MMOL/L (ref 136–145)
WBC # BLD AUTO: 5.4 X10*3/UL (ref 4.4–11.3)

## 2025-04-14 PROCEDURE — 3008F BODY MASS INDEX DOCD: CPT | Performed by: FAMILY MEDICINE

## 2025-04-14 PROCEDURE — 1170F FXNL STATUS ASSESSED: CPT | Performed by: FAMILY MEDICINE

## 2025-04-14 PROCEDURE — 36415 COLL VENOUS BLD VENIPUNCTURE: CPT

## 2025-04-14 PROCEDURE — 85025 COMPLETE CBC W/AUTO DIFF WBC: CPT

## 2025-04-14 PROCEDURE — 1158F ADVNC CARE PLAN TLK DOCD: CPT | Performed by: FAMILY MEDICINE

## 2025-04-14 PROCEDURE — 82378 CARCINOEMBRYONIC ANTIGEN: CPT | Mod: PORLAB

## 2025-04-14 PROCEDURE — 1123F ACP DISCUSS/DSCN MKR DOCD: CPT | Performed by: FAMILY MEDICINE

## 2025-04-14 PROCEDURE — G0439 PPPS, SUBSEQ VISIT: HCPCS | Performed by: FAMILY MEDICINE

## 2025-04-14 PROCEDURE — 1159F MED LIST DOCD IN RCRD: CPT | Performed by: FAMILY MEDICINE

## 2025-04-14 PROCEDURE — 1160F RVW MEDS BY RX/DR IN RCRD: CPT | Performed by: FAMILY MEDICINE

## 2025-04-14 PROCEDURE — 80053 COMPREHEN METABOLIC PANEL: CPT

## 2025-04-14 RX ORDER — SERTRALINE HYDROCHLORIDE 100 MG/1
100 TABLET, FILM COATED ORAL DAILY
Qty: 90 TABLET | Refills: 3 | Status: SHIPPED | OUTPATIENT
Start: 2025-04-14 | End: 2026-04-14

## 2025-04-14 RX ORDER — ATORVASTATIN CALCIUM 20 MG/1
20 TABLET, FILM COATED ORAL DAILY
Qty: 90 TABLET | Refills: 3 | Status: SHIPPED | OUTPATIENT
Start: 2025-04-14 | End: 2026-04-14

## 2025-04-14 RX ORDER — DICYCLOMINE HYDROCHLORIDE 20 MG/1
20 TABLET ORAL 4 TIMES DAILY PRN
Qty: 30 TABLET | Refills: 11 | Status: SHIPPED | OUTPATIENT
Start: 2025-04-14 | End: 2026-04-14

## 2025-04-14 RX ORDER — LEVOTHYROXINE SODIUM 25 UG/1
25 TABLET ORAL DAILY
Qty: 90 TABLET | Refills: 3 | Status: SHIPPED | OUTPATIENT
Start: 2025-04-14 | End: 2026-04-14

## 2025-04-14 ASSESSMENT — ACTIVITIES OF DAILY LIVING (ADL)
DRESSING: INDEPENDENT
BATHING: INDEPENDENT
DOING_HOUSEWORK: INDEPENDENT
MANAGING_FINANCES: INDEPENDENT
TAKING_MEDICATION: INDEPENDENT
GROCERY_SHOPPING: INDEPENDENT

## 2025-04-14 ASSESSMENT — PATIENT HEALTH QUESTIONNAIRE - PHQ9
1. LITTLE INTEREST OR PLEASURE IN DOING THINGS: NOT AT ALL
2. FEELING DOWN, DEPRESSED OR HOPELESS: NOT AT ALL
SUM OF ALL RESPONSES TO PHQ9 QUESTIONS 1 AND 2: 0

## 2025-04-14 ASSESSMENT — ANXIETY QUESTIONNAIRES
6. BECOMING EASILY ANNOYED OR IRRITABLE: NOT AT ALL
4. TROUBLE RELAXING: NOT AT ALL
7. FEELING AFRAID AS IF SOMETHING AWFUL MIGHT HAPPEN: NOT AT ALL
5. BEING SO RESTLESS THAT IT IS HARD TO SIT STILL: NOT AT ALL
GAD7 TOTAL SCORE: 0
2. NOT BEING ABLE TO STOP OR CONTROL WORRYING: NOT AT ALL
IF YOU CHECKED OFF ANY PROBLEMS ON THIS QUESTIONNAIRE, HOW DIFFICULT HAVE THESE PROBLEMS MADE IT FOR YOU TO DO YOUR WORK, TAKE CARE OF THINGS AT HOME, OR GET ALONG WITH OTHER PEOPLE: NOT DIFFICULT AT ALL
1. FEELING NERVOUS, ANXIOUS, OR ON EDGE: NOT AT ALL
3. WORRYING TOO MUCH ABOUT DIFFERENT THINGS: NOT AT ALL

## 2025-04-14 ASSESSMENT — ENCOUNTER SYMPTOMS
OCCASIONAL FEELINGS OF UNSTEADINESS: 0
LOSS OF SENSATION IN FEET: 0
DEPRESSION: 0
ABDOMINAL PAIN: 1

## 2025-04-14 NOTE — PROGRESS NOTES
"Subjective   Reason for Visit: Marisabel Richardson is an 75 y.o. female here for a Medicare Wellness visit.     Past Medical, Surgical, and Family History reviewed and updated in chart.    Reviewed all medications by prescribing practitioner or clinical pharmacist (such as prescriptions, OTCs, herbal therapies and supplements) and documented in the medical record.    HPI  Colon cancer survivor sees Dr. Gu, and will have genetic testing. Ct lung screening, 8/24, thyroid enlarged. Has colonoscopy 7/21/2025. To remove polyps. Lives in [place], does not have diagnosis of demnetia. Has blood test to get for screening for recurrence cancer,.   Patient Care Team:  Yolis Valencia MD as PCP - General (Family Medicine)  Yolis Valencia MD as PCP - [employer] PCP  Dodie Odom MD as Consulting Physician (Hematology and Oncology)  Bonilla Gu MD as Consulting Physician (Hematology and Oncology)   Astrid. GI  Review of Systems   All other systems reviewed and are negative.      Objective   Vitals:  /72   Resp 16   Ht 1.707 m (5' 7.21\")   Wt 57.6 kg (127 lb)   BMI 19.77 kg/m²       Physical Exam  Vitals reviewed.   Constitutional:       Appearance: Normal appearance.   HENT:      Head: Normocephalic and atraumatic.      Right Ear: Tympanic membrane normal.      Left Ear: Tympanic membrane normal.      Nose: Nose normal.      Mouth/Throat:      Mouth: Mucous membranes are moist.      Pharynx: Oropharynx is clear.   Eyes:      Extraocular Movements: Extraocular movements intact.      Conjunctiva/sclera: Conjunctivae normal.      Pupils: Pupils are equal, round, and reactive to light.   Cardiovascular:      Rate and Rhythm: Normal rate and regular rhythm.      Pulses: Normal pulses.      Heart sounds: Normal heart sounds.   Pulmonary:      Effort: Pulmonary effort is normal.      Breath sounds: Normal breath sounds.   Abdominal:      General: Abdomen is flat. Bowel sounds are normal.     "  Palpations: Abdomen is soft.   Musculoskeletal:         General: Normal range of motion.      Cervical back: Normal range of motion and neck supple.   Skin:     General: Skin is warm and dry.      Capillary Refill: Capillary refill takes less than 2 seconds.   Neurological:      General: No focal deficit present.      Mental Status: She is alert and oriented to person, place, and time.   Psychiatric:         Mood and Affect: Mood normal.         Behavior: Behavior normal.         Assessment & Plan  Major depressive disorder with single episode, in partial remission (CMS-[facility])    Orders:    Follow Up In [facility] - PCP - Established    sertraline (Zoloft) 100 mg tablet; Take 1 tablet (100 mg) by mouth once daily.    Acquired hypothyroidism    Orders:    Follow Up In [facility] - PCP - Established    levothyroxine (Synthroid, Levoxyl) 25 mcg tablet; Take 1 tablet (25 mcg) by mouth early in the morning.. Monday Wednesday Friday    CBC and Auto Differential; Future    TSH with reflex to Free T4 if abnormal; Future    Mixed hyperlipidemia    Orders:    Follow Up In [facility] - PCP - Established    atorvastatin (Lipitor) 20 mg tablet; Take 1 tablet (20 mg) by mouth once daily.    Comprehensive Metabolic Panel; Future    Lipid Panel; Future    Abdominal cramping    Orders:    dicyclomine (Bentyl) 20 mg tablet; Take 1 tablet (20 mg) by mouth 4 times a day as needed (abdominal pain or cramps).    Routine general medical examination at health care facility    Orders:    1 Year Follow Up In [facility] - PCP - Wellness Exam; Future    CBC and Auto Differential; Future    Comprehensive Metabolic Panel; Future    Lipid Panel; Future    TSH with reflex to Free T4 if abnormal; Future    Secondary and unspecified malignant neoplasm of intra-abdominal lymph nodes (Multi)  Had PET 6/2025, and showed increased uptake in rectum and will have colonoscopy diagnostic 6/2025 yash Dr. Resendiz.  Will also have blood test to determine recurrence.   Orders:    CBC and Auto Differential; Future    Thyromegaly    Orders:    US thyroid; Future    Nicotine dependence, cigarettes, in remission    Orders:    CT lung screening low dose; Future

## 2025-04-14 NOTE — PROGRESS NOTES
"Subjective   Reason for Visit: Marisabel Richardson is an 75 y.o. female here for a Medicare Wellness visit.     Past Medical, Surgical, and Family History reviewed and updated in chart.    Reviewed all medications by prescribing practitioner or clinical pharmacist (such as prescriptions, OTCs, herbal therapies and supplements) and documented in the medical record.    HPI  Colon cancer survivor sees Dr. Gu, and will have genetic testing. Ct lung screening, 8/24, thyroid enlarged. Has colonoscopy 7/21/2025. To remove polyps. Lives in [place], does not have diagnosis of dementia. Has blood test to get for screening for recurrence cancer. Had PET inJune of last year which showed some increased uptake in rectal area.   Patient Care Team:  Yolis Valencia MD as PCP - General (Family Medicine)  Yolis Valencia MD as PCP - [employer] PCP  Dodie Odom MD as Consulting Physician (Hematology and Oncology)  Bonilla Gu MD as Consulting Physician (Hematology and Oncology)   Astrid. GI  Review of Systems   Gastrointestinal:  Positive for abdominal pain.   All other systems reviewed and are negative.      Objective   Vitals:  /72   Resp 16   Ht 1.707 m (5' 7.21\")   Wt 57.6 kg (127 lb)   BMI 19.77 kg/m²       Physical Exam  Vitals reviewed.   Constitutional:       Appearance: Normal appearance.   HENT:      Head: Normocephalic and atraumatic.      Right Ear: Tympanic membrane normal.      Left Ear: Tympanic membrane normal.      Nose: Nose normal.      Mouth/Throat:      Mouth: Mucous membranes are moist.      Pharynx: Oropharynx is clear.   Eyes:      Extraocular Movements: Extraocular movements intact.      Conjunctiva/sclera: Conjunctivae normal.      Pupils: Pupils are equal, round, and reactive to light.   Cardiovascular:      Rate and Rhythm: Normal rate and regular rhythm.      Pulses: Normal pulses.      Heart sounds: Normal heart sounds.   Pulmonary:      Effort: Pulmonary " effort is normal.      Breath sounds: Normal breath sounds.   Abdominal:      General: Abdomen is flat. Bowel sounds are normal.      Palpations: Abdomen is soft.   Musculoskeletal:         General: Normal range of motion.      Cervical back: Normal range of motion and neck supple.   Skin:     General: Skin is warm and dry.      Capillary Refill: Capillary refill takes less than 2 seconds.   Neurological:      General: No focal deficit present.      Mental Status: She is alert and oriented to person, place, and time.   Psychiatric:         Mood and Affect: Mood normal.         Behavior: Behavior normal.         Assessment & Plan  Major depressive disorder with single episode, in partial remission (CMS-HCC)  Stable and controlled    Orders:    Follow Up In [facility] - PCP - Established    sertraline (Zoloft) 100 mg tablet; Take 1 tablet (100 mg) by mouth once daily.    Acquired hypothyroidism  Stable and controlled.  Thyromegaly on last lung CT, will order ultrasound of the thyroid  Orders:    Follow Up In [facility] - PCP - Established    levothyroxine (Synthroid, Levoxyl) 25 mcg tablet; Take 1 tablet (25 mcg) by mouth early in the morning.. Monday Wednesday Friday    CBC and Auto Differential; Future    TSH with reflex to Free T4 if abnormal; Future    Mixed hyperlipidemia  Stable and controlled  Orders:    Follow Up In [facility] - PCP - Established    atorvastatin (Lipitor) 20 mg tablet; Take 1 tablet (20 mg) by mouth once daily.    Comprehensive Metabolic Panel; Future    Lipid Panel; Future    Abdominal cramping  Stable on 2 antispasmodics  Orders:    dicyclomine (Bentyl) 20 mg tablet; Take 1 tablet (20 mg) by mouth 4 times a day as needed (abdominal pain or cramps).    Routine general medical examination at health care facility    Orders:    1 Year Follow Up In [facility] - PCP - Wellness Exam; Future    CBC and Auto Differential; Future    Comprehensive Metabolic  Panel; Future    Lipid Panel; Future    TSH with reflex to Free T4 if abnormal; Future    Follow Up In [facility] - PCP - Medicare Annual; Future    Secondary and unspecified malignant neoplasm of intra-abdominal lymph nodes (Multi)  Awaiting genetic testing and repeat colonoscopy  Orders:    CBC and Auto Differential; Future    Thyromegaly    Orders:    US thyroid; Future    Cigarette nicotine dependence without complication    Orders:    CT lung screening low dose; Future

## 2025-04-14 NOTE — ASSESSMENT & PLAN NOTE
Orders:    Follow Up In [facility] - PCP - Established    atorvastatin (Lipitor) 20 mg tablet; Take 1 tablet (20 mg) by mouth once daily.    Comprehensive Metabolic Panel; Future    Lipid Panel; Future

## 2025-04-14 NOTE — ASSESSMENT & PLAN NOTE
Had PET 6/2025, and showed increased uptake in rectum and will have colonoscopy diagnostic 6/2025 shanna Resendiz. Will also have blood test to determine recurrence.   Orders:    CBC and Auto Differential; Future

## 2025-04-14 NOTE — ASSESSMENT & PLAN NOTE
Stable and controlled    Orders:    Follow Up In [facility] - PCP - Established    sertraline (Zoloft) 100 mg tablet; Take 1 tablet (100 mg) by mouth once daily.

## 2025-04-14 NOTE — ASSESSMENT & PLAN NOTE
Orders:    Follow Up In [facility] - PCP - Established    sertraline (Zoloft) 100 mg tablet; Take 1 tablet (100 mg) by mouth once daily.

## 2025-04-14 NOTE — ASSESSMENT & PLAN NOTE
Stable and controlled.  Thyromegaly on last lung CT, will order ultrasound of the thyroid  Orders:    Follow Up In [facility] - PCP - Established    levothyroxine (Synthroid, Levoxyl) 25 mcg tablet; Take 1 tablet (25 mcg) by mouth early in the morning.. Monday Wednesday Friday    CBC and Auto Differential; Future    TSH with reflex to Free T4 if abnormal; Future

## 2025-04-14 NOTE — ASSESSMENT & PLAN NOTE
Orders:    Follow Up In [facility] - PCP - Established    levothyroxine (Synthroid, Levoxyl) 25 mcg tablet; Take 1 tablet (25 mcg) by mouth early in the morning.. Monday Wednesday Friday    CBC and Auto Differential; Future    TSH with reflex to Free T4 if abnormal; Future

## 2025-04-14 NOTE — ASSESSMENT & PLAN NOTE
Stable and controlled  Orders:    Follow Up In [facility] - PCP - Established    atorvastatin (Lipitor) 20 mg tablet; Take 1 tablet (20 mg) by mouth once daily.    Comprehensive Metabolic Panel; Future    Lipid Panel; Future

## 2025-04-21 ENCOUNTER — DOCUMENTATION (OUTPATIENT)
Dept: HEMATOLOGY/ONCOLOGY | Facility: CLINIC | Age: 75
End: 2025-04-21
Payer: MEDICARE

## 2025-04-21 NOTE — PROGRESS NOTES
I received her Signatera result which was checked on April 14, 2025.  It has turned positive (0.20 MTM per milliliter).  Her previous Signatera result dated February 15, 2024 was negative.  I have sent this report to her oncologist Dr. Gu.  I have also sent a [employer] message to the patient today to contact Dr. Gu and discuss further steps.    Dodie Odom MD.

## 2025-04-22 ENCOUNTER — TELEPHONE (OUTPATIENT)
Dept: HEMATOLOGY/ONCOLOGY | Facility: CLINIC | Age: 75
End: 2025-04-22
Payer: MEDICARE

## 2025-04-22 NOTE — TELEPHONE ENCOUNTER
Reason for Conversation  Results    Background   Lucy called and is asking about her blood work results     Disposition   No disposition on file.

## 2025-04-25 ENCOUNTER — APPOINTMENT (OUTPATIENT)
Dept: RADIOLOGY | Facility: HOSPITAL | Age: 75
End: 2025-04-25
Payer: MEDICARE

## 2025-04-25 ENCOUNTER — HOSPITAL ENCOUNTER (OUTPATIENT)
Dept: RADIOLOGY | Facility: CLINIC | Age: 75
Discharge: HOME | End: 2025-04-25
Payer: MEDICARE

## 2025-04-25 DIAGNOSIS — C18.7 MALIGNANT NEOPLASM OF SIGMOID COLON (MULTI): Primary | ICD-10-CM

## 2025-04-25 DIAGNOSIS — K63.89 MASS OF COLON: ICD-10-CM

## 2025-04-25 PROCEDURE — 71260 CT THORAX DX C+: CPT

## 2025-04-25 PROCEDURE — 2550000001 HC RX 255 CONTRASTS: Mod: JZ | Performed by: INTERNAL MEDICINE

## 2025-04-25 RX ADMIN — IOHEXOL 75 ML: 350 INJECTION, SOLUTION INTRAVENOUS at 09:16

## 2025-04-29 ENCOUNTER — HOSPITAL ENCOUNTER (OUTPATIENT)
Dept: RADIOLOGY | Facility: HOSPITAL | Age: 75
Discharge: HOME | End: 2025-04-29
Payer: MEDICARE

## 2025-04-29 DIAGNOSIS — E01.0 THYROMEGALY: ICD-10-CM

## 2025-04-29 PROCEDURE — 76536 US EXAM OF HEAD AND NECK: CPT | Performed by: RADIOLOGY

## 2025-04-29 PROCEDURE — 76536 US EXAM OF HEAD AND NECK: CPT

## 2025-04-30 ENCOUNTER — OFFICE VISIT (OUTPATIENT)
Dept: HEMATOLOGY/ONCOLOGY | Facility: CLINIC | Age: 75
End: 2025-04-30
Payer: MEDICARE

## 2025-04-30 VITALS
TEMPERATURE: 97.5 F | WEIGHT: 127.65 LBS | OXYGEN SATURATION: 97 % | BODY MASS INDEX: 19.87 KG/M2 | RESPIRATION RATE: 16 BRPM | HEART RATE: 87 BPM | DIASTOLIC BLOOD PRESSURE: 72 MMHG | SYSTOLIC BLOOD PRESSURE: 142 MMHG

## 2025-04-30 DIAGNOSIS — C18.7 MALIGNANT NEOPLASM OF SIGMOID COLON (MULTI): ICD-10-CM

## 2025-04-30 LAB
ALBUMIN SERPL-MCNC: 4.4 G/DL (ref 3.6–5.1)
ALP SERPL-CCNC: 92 U/L (ref 37–153)
ALT SERPL-CCNC: 11 U/L (ref 6–29)
ANION GAP SERPL CALCULATED.4IONS-SCNC: 11 MMOL/L (CALC) (ref 7–17)
AST SERPL-CCNC: 17 U/L (ref 10–35)
BASOPHILS # BLD AUTO: 52 CELLS/UL (ref 0–200)
BASOPHILS NFR BLD AUTO: 1 %
BILIRUB SERPL-MCNC: 0.5 MG/DL (ref 0.2–1.2)
BUN SERPL-MCNC: 14 MG/DL (ref 7–25)
CALCIUM SERPL-MCNC: 9.2 MG/DL (ref 8.6–10.4)
CHLORIDE SERPL-SCNC: 105 MMOL/L (ref 98–110)
CHOLEST SERPL-MCNC: 274 MG/DL
CHOLEST/HDLC SERPL: 3.8 (CALC)
CO2 SERPL-SCNC: 24 MMOL/L (ref 20–32)
CREAT SERPL-MCNC: 0.75 MG/DL (ref 0.6–1)
EGFRCR SERPLBLD CKD-EPI 2021: 84 ML/MIN/1.73M2
EOSINOPHIL # BLD AUTO: 161 CELLS/UL (ref 15–500)
EOSINOPHIL NFR BLD AUTO: 3.1 %
ERYTHROCYTE [DISTWIDTH] IN BLOOD BY AUTOMATED COUNT: 12.5 % (ref 11–15)
GLUCOSE SERPL-MCNC: 100 MG/DL (ref 65–99)
HCT VFR BLD AUTO: 48.1 % (ref 35–45)
HDLC SERPL-MCNC: 72 MG/DL
HGB BLD-MCNC: 16.3 G/DL (ref 11.7–15.5)
LDLC SERPL CALC-MCNC: 180 MG/DL (CALC)
LYMPHOCYTES # BLD AUTO: 1981 CELLS/UL (ref 850–3900)
LYMPHOCYTES NFR BLD AUTO: 38.1 %
MCH RBC QN AUTO: 32.8 PG (ref 27–33)
MCHC RBC AUTO-ENTMCNC: 33.9 G/DL (ref 32–36)
MCV RBC AUTO: 96.8 FL (ref 80–100)
MONOCYTES # BLD AUTO: 499 CELLS/UL (ref 200–950)
MONOCYTES NFR BLD AUTO: 9.6 %
NEUTROPHILS # BLD AUTO: 2506 CELLS/UL (ref 1500–7800)
NEUTROPHILS NFR BLD AUTO: 48.2 %
NONHDLC SERPL-MCNC: 202 MG/DL (CALC)
PLATELET # BLD AUTO: 214 THOUSAND/UL (ref 140–400)
PMV BLD REES-ECKER: 10.4 FL (ref 7.5–12.5)
POTASSIUM SERPL-SCNC: 4.7 MMOL/L (ref 3.5–5.3)
PROT SERPL-MCNC: 6.9 G/DL (ref 6.1–8.1)
RBC # BLD AUTO: 4.97 MILLION/UL (ref 3.8–5.1)
SODIUM SERPL-SCNC: 140 MMOL/L (ref 135–146)
TRIGL SERPL-MCNC: 100 MG/DL
TSH SERPL-ACNC: 0.6 MIU/L (ref 0.4–4.5)
WBC # BLD AUTO: 5.2 THOUSAND/UL (ref 3.8–10.8)

## 2025-04-30 PROCEDURE — 1159F MED LIST DOCD IN RCRD: CPT | Performed by: INTERNAL MEDICINE

## 2025-04-30 PROCEDURE — 1160F RVW MEDS BY RX/DR IN RCRD: CPT | Performed by: INTERNAL MEDICINE

## 2025-04-30 PROCEDURE — 99215 OFFICE O/P EST HI 40 MIN: CPT | Performed by: INTERNAL MEDICINE

## 2025-04-30 PROCEDURE — 1126F AMNT PAIN NOTED NONE PRSNT: CPT | Performed by: INTERNAL MEDICINE

## 2025-04-30 PROCEDURE — 1123F ACP DISCUSS/DSCN MKR DOCD: CPT | Performed by: INTERNAL MEDICINE

## 2025-04-30 ASSESSMENT — PAIN SCALES - GENERAL: PAINLEVEL_OUTOF10: 0-NO PAIN

## 2025-04-30 NOTE — PATIENT INSTRUCTIONS
Follow up visit for history of colon cancer.     Signatera testing became positive.     Dr. Gu is ordering PET scan.     Return for follow up after PET scan to review results.     Call the office at [phone] with questions or needs.  You may also contact your nurse or doctor with non-urgent issues by sending a [email or web address] message.

## 2025-04-30 NOTE — H&P (VIEW-ONLY)
Marisabel Richardson                       Female, 74 y.o., 1950  MRN: [id number]        Diagnoses: Oncology history  1. 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  Proficient MMR.  2.  A large goiter with a history of hypothyroidism-currently on replacement dose of thyroxine.   Genomic profile: pMM  Treatment     FOLFOX (adjuvant) x 6 cycles.    Started cycle 1 on 1/22/24 send finish on April 8, 2024 6/14/24 , CAT scan of  abdominal pelvis, no recurrent disease, right-sided hydronephrosis, no definable renal calculi  6/27/24 , PET , 1. Status post sigmoid colon tumor resection, no PET evidence of local recurrence in the postsurgical bed. 2. However, there are multiple FDG avid foci in the rectum, indeterminate, physiologic versus locoregional metastases, attentionon follow-up study  4/2/25 , CT chest abdominal pelvis,  New soft tissue nodularity/tethering of the anterior urinary  bladder wall concerning for metastatic involvement versus second primary,      New nonspecific pulmonary nodule of the right upper lobe measuring 0.4 cm,    New subacute/chronic compression deformity of the T5 vertebral body with a proximally 50% loss of vertebral body height, further evaluation with thoracic MRI could be considered if clinically warranted.  Netara positive, 0.20, before it was negative       #2 colon polyps, next colonoscopy will be done in July 2025  Chief complaint:  Resected sigmoid colon adenocarcinoma.   Interval history:  Patient has a history of sigmoid colon cancer status post resection and adjuvant chemotherapy.     Patient has a history of constipation taking Senokot every day as a loose bowel movement every day.  Also history of nonspecific abdominal pain most probably due to colonic spasm.  Colonoscopy done this year did show colon polyp status post polypectomy and colonoscopy repeated in July 2025.    CT result discussed with the patient patient has new  nodule on anterior wall of bladder and lung nodule which is nonspecific. Now Netara positive, discussed with patient       HPI:  ONCOLOGIC HISTORY-  2023: Had a colonoscopy in an outside facility when she presented with abdominal pain.  Colonoscopy showed a sigmoid mass.  Biopsy confirmed adenocarcinoma.  She also also had multiple polyps in her colon some of which have been removed.     2023: She had a CT scan of chest abdomen pelvis which did not show any distant metastatic disease.  CT scan showed a large goiter.  She tells me that she has an endocrinologist who she follows up with.     November 15, 2023: She underwent colectomy.  Pathology confirmed pT3 pN1 adenocarcinoma without any involvement of the margins.     2024: Started first cycle of chemotherapy with FOLFOX.       Review of systems: Negative except weakness, fatigue, tingling numbness of finger and toe, history of constipation nonspecific abdominal pain     Past Medical History:     Past Medical History   Past Medical History:  No date: Anxiety  No date: Colon polyp  No date: Depression  No date: HLD (hyperlipidemia)  No date: Hypothyroidism  No date: Mass of colon      Comment:  sigmoid colon      Surgical History:    Surgical History         Past Surgical History:   Procedure Laterality Date    CATARACT EXTRACTION Bilateral      COLONOSCOPY        HYSTERECTOMY             Family History:    Family History[]Expand by Default          Family History   Problem Relation Name Age of Onset    No Known Problems Mother        Cancer Father        Diabetes Father        Diabetes Sister        Diabetes Brother             Family Oncology History:    Cancer-related family history includes Cancer in her father.  Social History:    Social History            Tobacco Use    Smoking status: Every Day       Packs/day: 1       Types: Cigarettes       Last attempt to quit: 2023       Years since quittin.2       Passive  exposure: Never    Smokeless tobacco: Never   Vaping Use    Vaping Use: Never used   Substance Use Topics    Alcohol use: Never    Drug use: Never         Allergies  No Known Allergies   Medications       Current Outpatient Medications   Medication Instructions    amitriptyline (Elavil) 25 mg tablet 2 times daily    atorvastatin (LIPITOR) 20 mg, oral, Daily    hyoscyamine (ANASPAZ) 0.125 mg, oral, Every 4 hours PRN    levothyroxine (SYNTHROID, LEVOXYL) 25 mcg, oral    lidocaine-prilocaine (Emla) 2.5-2.5 % cream Apply to chest site 30-45 minutes prior to port being accessed for use    loperamide (Imodium) 2 mg capsule Take 2 capsules (4 mg) by mouth with the first episode of diarrhea and 1 capsule (2 mg) by mouth with any additional episodes. Maximum 8 capsules (16 mg) per day.    magnesium hydroxide (Milk of Magnesia) 400 mg/5 mL suspension oral, Daily PRN    multivitamin tablet 1 tablet, oral, Daily    ondansetron (ZOFRAN) 8 mg, oral, Every 8 hours PRN    prochlorperazine (COMPAZINE) 10 mg, oral, Every 6 hours PRN    sertraline (ZOLOFT)    Objective []Expand by Default  VS: /79 (BP Location: Right arm, Patient Position: Sitting, BP Cuff Size: Adult)   Pulse 106   Temp 36.1 °C (97 °F) (Temporal)   Resp 18   Wt 56.4 kg (124 lb 7.2 oz)   SpO2 97%   BMI 19.37 kg/m²    PHYSICAL EXAMINATION     Constitutional: Awake/alert/oriented x3, cooperative and answers questions appropriately.      Eyes: No pallor, conjunctival injection, clear sclera.     Mouth: No ulceration. No thrush. Moist/ clear      Respiratory/Thorax: Normal breath sounds with bilaterally symmetrical chest expansion. No dullness.       Cardiovascular: No audible murmurs, normal heart sounds. Palpable pulses      Gastrointestinal: Nondistended, soft, non-tender, no rebound tenderness or guarding, no masses palpable, no organomegaly, +BS, no bruits. No ascites.      Musculoskeletal: No joint swelling, redness.       Extremities: normal  extremities, no cyanosis edema, contusions or wounds, no clubbing.      Lymphatic: No significant lymphadenopathy.      Skin: Warm and dry, no lesions, no rashes.   Labs        Radiology result, CAT scan abdominal pelvis  , 4/25/25    1. New soft tissue nodularity/tethering of the anterior urinary  bladder wall concerning for metastatic involvement versus second  primary, further evaluation with PET-MRI is recommended  2. New nonspecific pulmonary nodule of the right upper lobe measuring  0.4 cm, this bears watching on follow-up imaging.  3. New subacute/chronic compression deformity of the T5 vertebral  body with a proximally 50% loss of vertebral body height, further  evaluation with thoracic MRI could be considered if clinically  warranted.            Assessment and Plan:  This is a pleasant woman who presented with a diagnosis of localized sigmoid colon adenocarcinoma.  She had a colonoscopy in October 2023 when she had abdominal pain.  Colonoscopy revealed multiple colonic polyps and a sigmoid colon mass.  Biopsy confirmed adenocarcinoma, proficient MMR.  Subsequently on November 15, 2023 she underwent colectomy by Dr. Lev Stanton.  She recovered well from the surgery.     We discussed the adjuvant treatment plan with her.  We recommended adjuvant chemotherapy based on the current NCCN guidelines since she has involvement of the lymph node.  We recommended FOLFOX for 6 cycles.  Started cycle 1 on 1/22/24 send finish on April 8, 2024 4/2/25 , CT chest abdominal pelvis,  New soft tissue nodularity/tethering of the anterior urinary  bladder wall concerning for metastatic involvement versus second primary,      New nonspecific pulmonary nodule of the right upper lobe measuring 0.4 cm,    New subacute/chronic compression deformity of the T5 vertebral body with a proximally 50% loss of vertebral body height, further evaluation with thoracic MRI could be considered if clinically warranted.  Netara positive, 0.20,  before it was negative          /30/25  #1 stage III sigmoid colon cancer status post resection and adjuvant chemotherapy, FOLFOX x 6    2.  Slight abnormal activity in the rectum on the basis of recent PET scan    3.  Right hydronephrosis nephrosis  #4 erythrocytosis possible due to smoking  Plan: CAT scan result discussed with the patient on the basis of the CAT scan result patient has a new nodule anterior to the urinary bladder, lung nodule and T5 fracture.,Nerara positive clinically suspecting possible recurrent disease.    For further evaluation I will schedule for PET scan.    Patient also has colon polyps and the next colonoscopy will be done in July 2025    Follow-up after 6 months.    Plan discussed with patient.       Follow-up after PET scan    Time spent 30

## 2025-04-30 NOTE — PROGRESS NOTES
Marisabel Richardson                       Female, 74 y.o., 1950  MRN: [id number]        Diagnoses: Oncology history  1. 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  Proficient MMR.  2.  A large goiter with a history of hypothyroidism-currently on replacement dose of thyroxine.   Genomic profile: pMM  Treatment     FOLFOX (adjuvant) x 6 cycles.    Started cycle 1 on 1/22/24 send finish on April 8, 2024 6/14/24 , CAT scan of  abdominal pelvis, no recurrent disease, right-sided hydronephrosis, no definable renal calculi  6/27/24 , PET , 1. Status post sigmoid colon tumor resection, no PET evidence of local recurrence in the postsurgical bed. 2. However, there are multiple FDG avid foci in the rectum, indeterminate, physiologic versus locoregional metastases, attentionon follow-up study  4/2/25 , CT chest abdominal pelvis,  New soft tissue nodularity/tethering of the anterior urinary  bladder wall concerning for metastatic involvement versus second primary,      New nonspecific pulmonary nodule of the right upper lobe measuring 0.4 cm,    New subacute/chronic compression deformity of the T5 vertebral body with a proximally 50% loss of vertebral body height, further evaluation with thoracic MRI could be considered if clinically warranted.  Netara positive, 0.20, before it was negative       #2 colon polyps, next colonoscopy will be done in July 2025  Chief complaint:  Resected sigmoid colon adenocarcinoma.   Interval history:  Patient has a history of sigmoid colon cancer status post resection and adjuvant chemotherapy.     Patient has a history of constipation taking Senokot every day as a loose bowel movement every day.  Also history of nonspecific abdominal pain most probably due to colonic spasm.  Colonoscopy done this year did show colon polyp status post polypectomy and colonoscopy repeated in July 2025.    CT result discussed with the patient patient has new  nodule on anterior wall of bladder and lung nodule which is nonspecific. Now Netara positive, discussed with patient       HPI:  ONCOLOGIC HISTORY-  2023: Had a colonoscopy in an outside facility when she presented with abdominal pain.  Colonoscopy showed a sigmoid mass.  Biopsy confirmed adenocarcinoma.  She also also had multiple polyps in her colon some of which have been removed.     2023: She had a CT scan of chest abdomen pelvis which did not show any distant metastatic disease.  CT scan showed a large goiter.  She tells me that she has an endocrinologist who she follows up with.     November 15, 2023: She underwent colectomy.  Pathology confirmed pT3 pN1 adenocarcinoma without any involvement of the margins.     2024: Started first cycle of chemotherapy with FOLFOX.       Review of systems: Negative except weakness, fatigue, tingling numbness of finger and toe, history of constipation nonspecific abdominal pain     Past Medical History:     Past Medical History   Past Medical History:  No date: Anxiety  No date: Colon polyp  No date: Depression  No date: HLD (hyperlipidemia)  No date: Hypothyroidism  No date: Mass of colon      Comment:  sigmoid colon      Surgical History:    Surgical History         Past Surgical History:   Procedure Laterality Date    CATARACT EXTRACTION Bilateral      COLONOSCOPY        HYSTERECTOMY             Family History:    Family History[]Expand by Default          Family History   Problem Relation Name Age of Onset    No Known Problems Mother        Cancer Father        Diabetes Father        Diabetes Sister        Diabetes Brother             Family Oncology History:    Cancer-related family history includes Cancer in her father.  Social History:    Social History            Tobacco Use    Smoking status: Every Day       Packs/day: 1       Types: Cigarettes       Last attempt to quit: 2023       Years since quittin.2       Passive  exposure: Never    Smokeless tobacco: Never   Vaping Use    Vaping Use: Never used   Substance Use Topics    Alcohol use: Never    Drug use: Never         Allergies  No Known Allergies   Medications       Current Outpatient Medications   Medication Instructions    amitriptyline (Elavil) 25 mg tablet 2 times daily    atorvastatin (LIPITOR) 20 mg, oral, Daily    hyoscyamine (ANASPAZ) 0.125 mg, oral, Every 4 hours PRN    levothyroxine (SYNTHROID, LEVOXYL) 25 mcg, oral    lidocaine-prilocaine (Emla) 2.5-2.5 % cream Apply to chest site 30-45 minutes prior to port being accessed for use    loperamide (Imodium) 2 mg capsule Take 2 capsules (4 mg) by mouth with the first episode of diarrhea and 1 capsule (2 mg) by mouth with any additional episodes. Maximum 8 capsules (16 mg) per day.    magnesium hydroxide (Milk of Magnesia) 400 mg/5 mL suspension oral, Daily PRN    multivitamin tablet 1 tablet, oral, Daily    ondansetron (ZOFRAN) 8 mg, oral, Every 8 hours PRN    prochlorperazine (COMPAZINE) 10 mg, oral, Every 6 hours PRN    sertraline (ZOLOFT)    Objective []Expand by Default  VS: /79 (BP Location: Right arm, Patient Position: Sitting, BP Cuff Size: Adult)   Pulse 106   Temp 36.1 °C (97 °F) (Temporal)   Resp 18   Wt 56.4 kg (124 lb 7.2 oz)   SpO2 97%   BMI 19.37 kg/m²    PHYSICAL EXAMINATION     Constitutional: Awake/alert/oriented x3, cooperative and answers questions appropriately.      Eyes: No pallor, conjunctival injection, clear sclera.     Mouth: No ulceration. No thrush. Moist/ clear      Respiratory/Thorax: Normal breath sounds with bilaterally symmetrical chest expansion. No dullness.       Cardiovascular: No audible murmurs, normal heart sounds. Palpable pulses      Gastrointestinal: Nondistended, soft, non-tender, no rebound tenderness or guarding, no masses palpable, no organomegaly, +BS, no bruits. No ascites.      Musculoskeletal: No joint swelling, redness.       Extremities: normal  extremities, no cyanosis edema, contusions or wounds, no clubbing.      Lymphatic: No significant lymphadenopathy.      Skin: Warm and dry, no lesions, no rashes.   Labs        Radiology result, CAT scan abdominal pelvis  , 4/25/25    1. New soft tissue nodularity/tethering of the anterior urinary  bladder wall concerning for metastatic involvement versus second  primary, further evaluation with PET-MRI is recommended  2. New nonspecific pulmonary nodule of the right upper lobe measuring  0.4 cm, this bears watching on follow-up imaging.  3. New subacute/chronic compression deformity of the T5 vertebral  body with a proximally 50% loss of vertebral body height, further  evaluation with thoracic MRI could be considered if clinically  warranted.            Assessment and Plan:  This is a pleasant woman who presented with a diagnosis of localized sigmoid colon adenocarcinoma.  She had a colonoscopy in October 2023 when she had abdominal pain.  Colonoscopy revealed multiple colonic polyps and a sigmoid colon mass.  Biopsy confirmed adenocarcinoma, proficient MMR.  Subsequently on November 15, 2023 she underwent colectomy by Dr. Lev Stanton.  She recovered well from the surgery.     We discussed the adjuvant treatment plan with her.  We recommended adjuvant chemotherapy based on the current NCCN guidelines since she has involvement of the lymph node.  We recommended FOLFOX for 6 cycles.  Started cycle 1 on 1/22/24 send finish on April 8, 2024 4/2/25 , CT chest abdominal pelvis,  New soft tissue nodularity/tethering of the anterior urinary  bladder wall concerning for metastatic involvement versus second primary,      New nonspecific pulmonary nodule of the right upper lobe measuring 0.4 cm,    New subacute/chronic compression deformity of the T5 vertebral body with a proximally 50% loss of vertebral body height, further evaluation with thoracic MRI could be considered if clinically warranted.  Netara positive, 0.20,  before it was negative          /30/25  #1 stage III sigmoid colon cancer status post resection and adjuvant chemotherapy, FOLFOX x 6    2.  Slight abnormal activity in the rectum on the basis of recent PET scan    3.  Right hydronephrosis nephrosis  #4 erythrocytosis possible due to smoking  Plan: CAT scan result discussed with the patient on the basis of the CAT scan result patient has a new nodule anterior to the urinary bladder, lung nodule and T5 fracture.,Nerara positive clinically suspecting possible recurrent disease.    For further evaluation I will schedule for PET scan.    Patient also has colon polyps and the next colonoscopy will be done in July 2025    Follow-up after 6 months.    Plan discussed with patient.       Follow-up after PET scan    Time spent 30

## 2025-05-05 ENCOUNTER — TELEPHONE (OUTPATIENT)
Dept: HEMATOLOGY/ONCOLOGY | Facility: CLINIC | Age: 75
End: 2025-05-05
Payer: MEDICARE

## 2025-05-05 NOTE — TELEPHONE ENCOUNTER
Yue is calling because Marisabel is scheduled for a PET scan on 05/09 and she is asking if it can be done any sooner.

## 2025-05-09 ENCOUNTER — HOSPITAL ENCOUNTER (OUTPATIENT)
Dept: RADIOLOGY | Facility: HOSPITAL | Age: 75
Discharge: HOME | End: 2025-05-09
Payer: MEDICARE

## 2025-05-09 DIAGNOSIS — C18.7 MALIGNANT NEOPLASM OF SIGMOID COLON (MULTI): ICD-10-CM

## 2025-05-09 PROCEDURE — 78815 PET IMAGE W/CT SKULL-THIGH: CPT | Mod: PS

## 2025-05-09 PROCEDURE — A9552 F18 FDG: HCPCS | Performed by: INTERNAL MEDICINE

## 2025-05-09 PROCEDURE — 3430000001 HC RX 343 DIAGNOSTIC RADIOPHARMACEUTICALS: Performed by: INTERNAL MEDICINE

## 2025-05-09 RX ORDER — FLUDEOXYGLUCOSE F 18 200 MCI/ML
12.9 INJECTION, SOLUTION INTRAVENOUS
Status: COMPLETED | OUTPATIENT
Start: 2025-05-09 | End: 2025-05-09

## 2025-05-09 RX ADMIN — FLUDEOXYGLUCOSE F 18 12.9 MILLICURIE: 200 INJECTION, SOLUTION INTRAVENOUS at 09:10

## 2025-05-13 ENCOUNTER — OFFICE VISIT (OUTPATIENT)
Dept: HEMATOLOGY/ONCOLOGY | Facility: CLINIC | Age: 75
End: 2025-05-13
Payer: MEDICARE

## 2025-05-13 VITALS
BODY MASS INDEX: 19.88 KG/M2 | OXYGEN SATURATION: 94 % | RESPIRATION RATE: 16 BRPM | HEIGHT: 67 IN | TEMPERATURE: 98.1 F | WEIGHT: 126.65 LBS | SYSTOLIC BLOOD PRESSURE: 118 MMHG | HEART RATE: 92 BPM | DIASTOLIC BLOOD PRESSURE: 70 MMHG

## 2025-05-13 DIAGNOSIS — C18.7 MALIGNANT NEOPLASM OF SIGMOID COLON (MULTI): ICD-10-CM

## 2025-05-13 PROCEDURE — 3008F BODY MASS INDEX DOCD: CPT | Performed by: INTERNAL MEDICINE

## 2025-05-13 PROCEDURE — 1126F AMNT PAIN NOTED NONE PRSNT: CPT | Performed by: INTERNAL MEDICINE

## 2025-05-13 PROCEDURE — 99214 OFFICE O/P EST MOD 30 MIN: CPT | Performed by: INTERNAL MEDICINE

## 2025-05-13 PROCEDURE — 1159F MED LIST DOCD IN RCRD: CPT | Performed by: INTERNAL MEDICINE

## 2025-05-13 PROCEDURE — 1160F RVW MEDS BY RX/DR IN RCRD: CPT | Performed by: INTERNAL MEDICINE

## 2025-05-13 ASSESSMENT — PAIN SCALES - GENERAL: PAINLEVEL_OUTOF10: 0-NO PAIN

## 2025-05-13 NOTE — PROGRESS NOTES
Marisabel Richardson                       Female, 74 y.o., 1950  MRN: [id number]        Diagnoses: Oncology history  1. 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  Proficient MMR.  2.  A large goiter with a history of hypothyroidism-currently on replacement dose of thyroxine.   Genomic profile: pMM  Treatment     FOLFOX (adjuvant) x 6 cycles.    Started cycle 1 on 1/22/24 send finish on April 8, 2024 6/14/24 , CAT scan of  abdominal pelvis, no recurrent disease, right-sided hydronephrosis, no definable renal calculi  6/27/24 , PET , 1. Status post sigmoid colon tumor resection, no PET evidence of local recurrence in the postsurgical bed. 2. However, there are multiple FDG avid foci in the rectum, indeterminate, physiologic versus locoregional metastases, attentionon follow-up study  4/2/25 , CT chest abdominal pelvis,  New soft tissue nodularity/tethering of the anterior urinary  bladder wall concerning for metastatic involvement versus second primary,      New nonspecific pulmonary nodule of the right upper lobe measuring 0.4 cm,    New subacute/chronic compression deformity of the T5 vertebral body with a proximally 50% loss of vertebral body height, further evaluation with thoracic MRI could be considered if clinically warranted.  Netara positive, 0.20, before it was negative  5/9/25 , PET scan, status post sigmoid colon resection, no local FDG uptake, large for 2 planning peritoneal cyst in the pelvis with mass effect on the bladder, mild FDG activity which is nonspecific     #2 colon polyps, next colonoscopy will be done in July 2025  Chief complaint:  Resected sigmoid colon adenocarcinoma.   Interval history:  Patient has a history of sigmoid colon cancer status post resection and adjuvant chemotherapy.     Patient has a history of constipation taking Senokot every day as a loose bowel movement every day.  Also history of nonspecific abdominal pain most  probably due to colonic spasm.  Colonoscopy done this year did show colon polyp status post polypectomy and colonoscopy will be repeated in July 2025.    CT result discussed with the patient patient has new nodule on anterior wall of bladder and lung nodule which is nonspecific. Now Netara positive, PET scan result discussed with the patient no evidence of local recurrent disease and pelvic cystic lesion anterior to bladder.     HPI:  ONCOLOGIC HISTORY-  October 2, 2023: Had a colonoscopy in an outside facility when she presented with abdominal pain.  Colonoscopy showed a sigmoid mass.  Biopsy confirmed adenocarcinoma.  She also also had multiple polyps in her colon some of which have been removed.     November 9, 2023: She had a CT scan of chest abdomen pelvis which did not show any distant metastatic disease.  CT scan showed a large goiter.  She tells me that she has an endocrinologist who she follows up with.     November 15, 2023: She underwent colectomy.  Pathology confirmed pT3 pN1 adenocarcinoma without any involvement of the margins.     January 22, 2024: Started first cycle of chemotherapy with FOLFOX.       Review of systems: Negative except weakness, fatigue, tingling numbness of finger and toe, history of constipation nonspecific abdominal pain     Past Medical History:     Past Medical History   Past Medical History:  No date: Anxiety  No date: Colon polyp  No date: Depression  No date: HLD (hyperlipidemia)  No date: Hypothyroidism  No date: Mass of colon      Comment:  sigmoid colon      Surgical History:    Surgical History         Past Surgical History:   Procedure Laterality Date    CATARACT EXTRACTION Bilateral      COLONOSCOPY        HYSTERECTOMY             Family History:    Family History[]Expand by Default          Family History   Problem Relation Name Age of Onset    No Known Problems Mother        Cancer Father        Diabetes Father        Diabetes Sister        Diabetes Brother              Family Oncology History:    Cancer-related family history includes Cancer in her father.  Social History:    Social History            Tobacco Use    Smoking status: Every Day       Packs/day: 1       Types: Cigarettes       Last attempt to quit: 2023       Years since quittin.2       Passive exposure: Never    Smokeless tobacco: Never   Vaping Use    Vaping Use: Never used   Substance Use Topics    Alcohol use: Never    Drug use: Never         Allergies  No Known Allergies   Medications       Current Outpatient Medications   Medication Instructions    amitriptyline (Elavil) 25 mg tablet 2 times daily    atorvastatin (LIPITOR) 20 mg, oral, Daily    hyoscyamine (ANASPAZ) 0.125 mg, oral, Every 4 hours PRN    levothyroxine (SYNTHROID, LEVOXYL) 25 mcg, oral    lidocaine-prilocaine (Emla) 2.5-2.5 % cream Apply to chest site 30-45 minutes prior to port being accessed for use    loperamide (Imodium) 2 mg capsule Take 2 capsules (4 mg) by mouth with the first episode of diarrhea and 1 capsule (2 mg) by mouth with any additional episodes. Maximum 8 capsules (16 mg) per day.    magnesium hydroxide (Milk of Magnesia) 400 mg/5 mL suspension oral, Daily PRN    multivitamin tablet 1 tablet, oral, Daily    ondansetron (ZOFRAN) 8 mg, oral, Every 8 hours PRN    prochlorperazine (COMPAZINE) 10 mg, oral, Every 6 hours PRN    sertraline (ZOLOFT)    Objective []Expand by Default  VS: /79 (BP Location: Right arm, Patient Position: Sitting, BP Cuff Size: Adult)   Pulse 106   Temp 36.1 °C (97 °F) (Temporal)   Resp 18   Wt 56.4 kg (124 lb 7.2 oz)   SpO2 97%   BMI 19.37 kg/m²    PHYSICAL EXAMINATION     Constitutional: Awake/alert/oriented x3, cooperative and answers questions appropriately.      Eyes: No pallor, conjunctival injection, clear sclera.     Mouth: No ulceration. No thrush. Moist/ clear      Respiratory/Thorax: Normal breath sounds with bilaterally symmetrical chest expansion. No dullness.        Cardiovascular: No audible murmurs, normal heart sounds. Palpable pulses      Gastrointestinal: Nondistended, soft, non-tender, no rebound tenderness or guarding, no masses palpable, no organomegaly, +BS, no bruits. No ascites.      Musculoskeletal: No joint swelling, redness.       Extremities: normal extremities, no cyanosis edema, contusions or wounds, no clubbing.      Lymphatic: No significant lymphadenopathy.      Skin: Warm and dry, no lesions, no rashes.   Labs        Radiology result, CAT scan abdominal pelvis  , 4/25/25    1. New soft tissue nodularity/tethering of the anterior urinary  bladder wall concerning for metastatic involvement versus second  primary, further evaluation with PET-MRI is recommended  2. New nonspecific pulmonary nodule of the right upper lobe measuring  0.4 cm, this bears watching on follow-up imaging.  3. New subacute/chronic compression deformity of the T5 vertebral  body with a proximally 50% loss of vertebral body height, further  evaluation with thoracic MRI could be considered if clinically  warranted.  5/9/25 , PET scan, Status post sigmoid colon resection with no focal FDG uptake in  the surgical bed to suggest local recurrence.  2. Large photopenic peritoneal cyst in the pelvis with mass effect on  the bladder and FDG avid focus along posteroinferior aspect of the  peritoneal cyst, non-specific, further evaluation with pelvic MRI  could be helpful for further tissue characterization  3. Focal FDG uptake corresponding to subcentimeter right upper lobe  nodule seen on CT dated 04/25/2025.  4. No FDG avid raf or osseous metastatic disease.          Assessment and Plan:  This is a pleasant woman who presented with a diagnosis of localized sigmoid colon adenocarcinoma.  She had a colonoscopy in October 2023 when she had abdominal pain.  Colonoscopy revealed multiple colonic polyps and a sigmoid colon mass.  Biopsy confirmed adenocarcinoma, proficient MMR.  Subsequently on  November 15, 2023 she underwent colectomy by Dr. Lev Stanton.  She recovered well from the surgery.     We discussed the adjuvant treatment plan with her.  We recommended adjuvant chemotherapy based on the current NCCN guidelines since she has involvement of the lymph node.  We recommended FOLFOX for 6 cycles.  Started cycle 1 on 1/22/24 send finish on April 8, 2024 4/2/25 , CT chest abdominal pelvis,  New soft tissue nodularity/tethering of the anterior urinary  bladder wall concerning for metastatic involvement versus second primary,      New nonspecific pulmonary nodule of the right upper lobe measuring 0.4 cm,    New subacute/chronic compression deformity of the T5 vertebral body with a proximally 50% loss of vertebral body height, further evaluation with thoracic MRI could be considered if clinically warranted.  Netara positive, 0.20, before it was negative          5/13/25  #1 stage III sigmoid colon cancer status post resection and adjuvant chemotherapy, FOLFOX x 6    2.  Slight abnormal activity in the rectum on the basis of recent PET scan    3.  Right hydronephrosis nephrosis    #4 erythrocytosis possible due to smoking    Plan: PET scan result discussed with the patient it did show pelvic cystic lesion anterior to bladder.  No evidence of local recurrent disease.  Discussed with the patient I will continue to monitor clinically.  I will schedule for MRI of pelvis and reevaluation after 4 months.  Continue to monitor CBC CMP and CEA level.         Follow-up after 4 months.    Plan discussed with patient.            Time spent 30

## 2025-05-13 NOTE — PATIENT INSTRUCTIONS
Office follow up with dr. Gu today for sigmoid colon adenocarcinoma    He has ordered you to be scheduled for an MRI of Pelvis in 3 months in August    You will follow up with dr. Gu in 4 months

## 2025-05-16 ENCOUNTER — TELEPHONE (OUTPATIENT)
Dept: GASTROENTEROLOGY | Facility: HOSPITAL | Age: 75
End: 2025-05-16
Payer: MEDICARE

## 2025-05-16 DIAGNOSIS — Z85.038 PERSONAL HISTORY OF COLON CANCER, STAGE III: Primary | ICD-10-CM

## 2025-05-16 RX ORDER — SODIUM, POTASSIUM,MAG SULFATES 17.5-3.13G
0.51 SOLUTION, RECONSTITUTED, ORAL ORAL SEE ADMIN INSTRUCTIONS
Qty: 354 ML | Refills: 0 | Status: SHIPPED | OUTPATIENT
Start: 2025-05-16

## 2025-05-29 ENCOUNTER — HOSPITAL ENCOUNTER (OUTPATIENT)
Dept: GASTROENTEROLOGY | Facility: HOSPITAL | Age: 75
Discharge: HOME | End: 2025-05-29
Payer: MEDICARE

## 2025-05-29 ENCOUNTER — ANESTHESIA (OUTPATIENT)
Dept: GASTROENTEROLOGY | Facility: HOSPITAL | Age: 75
End: 2025-05-29
Payer: MEDICARE

## 2025-05-29 ENCOUNTER — ANESTHESIA EVENT (OUTPATIENT)
Dept: GASTROENTEROLOGY | Facility: HOSPITAL | Age: 75
End: 2025-05-29
Payer: MEDICARE

## 2025-05-29 VITALS
TEMPERATURE: 97.3 F | SYSTOLIC BLOOD PRESSURE: 132 MMHG | HEART RATE: 75 BPM | DIASTOLIC BLOOD PRESSURE: 82 MMHG | OXYGEN SATURATION: 99 % | RESPIRATION RATE: 14 BRPM

## 2025-05-29 DIAGNOSIS — Z85.038 PERSONAL HISTORY OF COLON CANCER, STAGE III: ICD-10-CM

## 2025-05-29 PROCEDURE — A45385 PR COLONOSCOPY,REMV LESN,SNARE: Performed by: ANESTHESIOLOGY

## 2025-05-29 PROCEDURE — 2500000004 HC RX 250 GENERAL PHARMACY W/ HCPCS (ALT 636 FOR OP/ED): Mod: JZ | Performed by: NURSE ANESTHETIST, CERTIFIED REGISTERED

## 2025-05-29 PROCEDURE — 7100000010 HC PHASE TWO TIME - EACH INCREMENTAL 1 MINUTE

## 2025-05-29 PROCEDURE — A45385 PR COLONOSCOPY,REMV LESN,SNARE: Performed by: NURSE ANESTHETIST, CERTIFIED REGISTERED

## 2025-05-29 PROCEDURE — 45385 COLONOSCOPY W/LESION REMOVAL: CPT | Performed by: INTERNAL MEDICINE

## 2025-05-29 PROCEDURE — 7100000009 HC PHASE TWO TIME - INITIAL BASE CHARGE

## 2025-05-29 PROCEDURE — 99100 ANES PT EXTEME AGE<1 YR&>70: CPT | Performed by: ANESTHESIOLOGY

## 2025-05-29 PROCEDURE — 3700000001 HC GENERAL ANESTHESIA TIME - INITIAL BASE CHARGE

## 2025-05-29 PROCEDURE — 3700000002 HC GENERAL ANESTHESIA TIME - EACH INCREMENTAL 1 MINUTE

## 2025-05-29 RX ORDER — LIDOCAINE HYDROCHLORIDE 20 MG/ML
INJECTION, SOLUTION EPIDURAL; INFILTRATION; INTRACAUDAL; PERINEURAL AS NEEDED
Status: DISCONTINUED | OUTPATIENT
Start: 2025-05-29 | End: 2025-05-29

## 2025-05-29 RX ORDER — FENTANYL CITRATE 50 UG/ML
INJECTION, SOLUTION INTRAMUSCULAR; INTRAVENOUS AS NEEDED
Status: DISCONTINUED | OUTPATIENT
Start: 2025-05-29 | End: 2025-05-29

## 2025-05-29 RX ORDER — PROPOFOL 10 MG/ML
INJECTION, EMULSION INTRAVENOUS CONTINUOUS PRN
Status: DISCONTINUED | OUTPATIENT
Start: 2025-05-29 | End: 2025-05-29

## 2025-05-29 RX ORDER — PHENYLEPHRINE HCL IN 0.9% NACL 1 MG/10 ML
SYRINGE (ML) INTRAVENOUS AS NEEDED
Status: DISCONTINUED | OUTPATIENT
Start: 2025-05-29 | End: 2025-05-29

## 2025-05-29 RX ADMIN — FENTANYL CITRATE 100 MCG: 50 INJECTION, SOLUTION INTRAMUSCULAR; INTRAVENOUS at 12:09

## 2025-05-29 RX ADMIN — Medication 100 MCG: at 12:18

## 2025-05-29 RX ADMIN — LIDOCAINE HYDROCHLORIDE 60 MG: 20 INJECTION, SOLUTION EPIDURAL; INFILTRATION; INTRACAUDAL; PERINEURAL at 12:09

## 2025-05-29 RX ADMIN — SODIUM CHLORIDE, SODIUM LACTATE, POTASSIUM CHLORIDE, AND CALCIUM CHLORIDE: .6; .31; .03; .02 INJECTION, SOLUTION INTRAVENOUS at 12:04

## 2025-05-29 RX ADMIN — PROPOFOL 200 MCG/KG/MIN: 10 INJECTION, EMULSION INTRAVENOUS at 12:10

## 2025-05-29 RX ADMIN — Medication 100 MCG: at 12:16

## 2025-05-29 SDOH — HEALTH STABILITY: MENTAL HEALTH: CURRENT SMOKER: 1

## 2025-05-29 ASSESSMENT — PAIN SCALES - GENERAL
PAINLEVEL_OUTOF10: 0 - NO PAIN

## 2025-05-29 ASSESSMENT — PAIN - FUNCTIONAL ASSESSMENT
PAIN_FUNCTIONAL_ASSESSMENT: 0-10

## 2025-05-29 NOTE — ANESTHESIA PREPROCEDURE EVALUATION
Patient: Marisabel Richardson    Procedure Information       Date/Time: 05/29/25 1150    Scheduled providers: Braulio Resendiz DO; Lux Suggs MD; Sal Deal, RN; Windy Mackenzie, RN; Rosette Vasques RN    Procedure: COLONOSCOPY    Location: [facility]            Relevant Problems   Anesthesia (within normal limits)      Cardiac   (+) Hyperlipidemia      Pulmonary (within normal limits)      Neuro   (+) Anxiety   (+) Major depressive disorder with single episode, in partial remission      GI   (+) Malignant neoplasm of colon      /Renal (within normal limits)      Liver   (+) Liver cyst   (+) Malignant neoplasm of colon      Endocrine   (+) Hypothyroidism   (+) Hypothyroidism due to non-medication exogenous substances   (+) Thyromegaly      Hematology (within normal limits)      Musculoskeletal (within normal limits)      HEENT (within normal limits)      ID (within normal limits)      Skin (within normal limits)      GYN (within normal limits)       Clinical information reviewed:   Tobacco  Allergies  Meds   Med Hx  Surg Hx  OB Status  Fam Hx  Soc   Hx        NPO Detail:  NPO/Void Status  Carbohydrate Drink Given Prior to Surgery? : Y  Date of Last Liquid: 05/29/25  Time of Last Liquid: 0815  Date of Last Solid: 05/27/25  Time of Last Solid: 2345  Last Intake Type: Clear fluids  Time of Last Void: 1030         Physical Exam    Airway  Mallampati: II  TM distance: >3 FB  Neck ROM: full  Mouth opening: 3 or more finger widths     Cardiovascular - normal exam   Dental   Comments: Poor dentition. Multiple missing teeth.     Pulmonary - normal exam   Abdominal - normal exam           Anesthesia Plan    History of general anesthesia?: yes  History of complications of general anesthesia?: no    ASA 3     MAC     The patient is a current smoker.  Patient was previously instructed to abstain from smoking on day of procedure.  Patient smoked on day of procedure.    intravenous induction    Anesthetic plan and risks discussed with patient.  Use of blood products discussed with patient who consented to blood products.

## 2025-05-29 NOTE — DISCHARGE INSTRUCTIONS

## 2025-05-29 NOTE — INTERVAL H&P NOTE
H&P reviewed. The patient was examined and there are no changes to the H&P.    Referred by Dr. Whitney for multiple colon polyps and history of colon cancer 2023.  Here for surveillance.  Last colonoscopy was April 2024.    Braulio Resendiz, DO

## 2025-05-29 NOTE — ANESTHESIA POSTPROCEDURE EVALUATION
Patient: Marisabel Richardson    Procedure Summary       Date: 05/29/25 Room / Location: [facility]    Anesthesia Start: 1201 Anesthesia Stop:     Procedure: COLONOSCOPY Diagnosis: Personal history of colon cancer, stage III    Scheduled Providers: Braulio Resendiz DO; Lux Suggs MD; Sal Deal, OMAR; Windy Mackenzie, RN; Rosette Vasques RN Responsible Provider: Lux Suggs MD    Anesthesia Type: MAC ASA Status: 3            Anesthesia Type: MAC    Vitals Value Taken Time   BP 98/56 05/29/25 12:36   Temp 36.3 °C (97.3 °F) 05/29/25 12:32   Pulse 88 05/29/25 12:47   Resp 14 05/29/25 12:32   SpO2 99 % 05/29/25 12:46   Vitals shown include unfiled device data.    Anesthesia Post Evaluation    Patient location during evaluation: bedside  Patient participation: complete - patient participated  Level of consciousness: awake and alert  Pain management: adequate  Airway patency: patent  Cardiovascular status: acceptable  Respiratory status: acceptable  Hydration status: acceptable  Postoperative Nausea and Vomiting: none        No notable events documented.

## 2025-06-12 LAB
LABORATORY COMMENT REPORT: NORMAL
PATH REPORT.FINAL DX SPEC: NORMAL
PATH REPORT.GROSS SPEC: NORMAL
PATH REPORT.TOTAL CANCER: NORMAL

## 2025-07-08 DIAGNOSIS — Z12.31 ENCOUNTER FOR SCREENING MAMMOGRAM FOR BREAST CANCER: ICD-10-CM

## 2025-07-21 ENCOUNTER — APPOINTMENT (OUTPATIENT)
Dept: GASTROENTEROLOGY | Facility: HOSPITAL | Age: 75
End: 2025-07-21
Payer: MEDICARE

## 2025-07-29 DIAGNOSIS — F32.4 MAJOR DEPRESSIVE DISORDER WITH SINGLE EPISODE, IN PARTIAL REMISSION: Primary | ICD-10-CM

## 2025-07-29 RX ORDER — AMITRIPTYLINE HYDROCHLORIDE 25 MG/1
25 TABLET, FILM COATED ORAL 2 TIMES DAILY
Qty: 90 TABLET | Refills: 0 | Status: SHIPPED | OUTPATIENT
Start: 2025-07-29

## 2025-07-29 NOTE — TELEPHONE ENCOUNTER
Patient called to request medication refill.    Last appointment with our providers: 04/14/2025    Next appointment with our providers: 05/06/2026  Name of Medication: amitriptyline (Elavil) 25 mg tablet     Pharmacy:   [id number] [address]  [address] [address]  Phone: [phone]  Fax: [phone]

## 2025-08-01 ENCOUNTER — HOSPITAL ENCOUNTER (OUTPATIENT)
Dept: RADIOLOGY | Facility: HOSPITAL | Age: 75
Discharge: HOME | End: 2025-08-01
Payer: MEDICARE

## 2025-08-01 DIAGNOSIS — C18.7 MALIGNANT NEOPLASM OF SIGMOID COLON (MULTI): ICD-10-CM

## 2025-08-01 PROCEDURE — A9575 INJ GADOTERATE MEGLUMI 0.1ML: HCPCS | Mod: JW | Performed by: INTERNAL MEDICINE

## 2025-08-01 PROCEDURE — 72197 MRI PELVIS W/O & W/DYE: CPT

## 2025-08-01 PROCEDURE — 2550000001 HC RX 255 CONTRASTS: Mod: JW | Performed by: INTERNAL MEDICINE

## 2025-08-01 RX ORDER — GADOTERATE MEGLUMINE 376.9 MG/ML
0.2 INJECTION INTRAVENOUS
Status: COMPLETED | OUTPATIENT
Start: 2025-08-01 | End: 2025-08-01

## 2025-08-01 RX ADMIN — GADOTERATE MEGLUMINE 11.3 ML: 376.9 INJECTION INTRAVENOUS at 09:49

## 2025-08-04 DIAGNOSIS — R10.9 ABDOMINAL CRAMPING: ICD-10-CM

## 2025-08-04 DIAGNOSIS — F32.4 MAJOR DEPRESSIVE DISORDER WITH SINGLE EPISODE, IN PARTIAL REMISSION: ICD-10-CM

## 2025-08-04 RX ORDER — AMITRIPTYLINE HYDROCHLORIDE 25 MG/1
25 TABLET, FILM COATED ORAL 2 TIMES DAILY
Qty: 180 TABLET | Refills: 3 | Status: SHIPPED | OUTPATIENT
Start: 2025-08-04

## 2025-08-04 RX ORDER — DICYCLOMINE HYDROCHLORIDE 20 MG/1
20 TABLET ORAL 4 TIMES DAILY PRN
Qty: 360 TABLET | Refills: 3 | Status: SHIPPED | OUTPATIENT
Start: 2025-08-04 | End: 2026-08-04

## 2025-08-06 ENCOUNTER — TELEPHONE (OUTPATIENT)
Dept: HEMATOLOGY/ONCOLOGY | Facility: CLINIC | Age: 75
End: 2025-08-06
Payer: MEDICARE

## 2025-08-06 NOTE — TELEPHONE ENCOUNTER
Reason for Conversation  called to get an earlier appointment    Background   Patients relative Eether called she is on trhe list., Patient saw her MRI results on my chart and wanted to know if she can get an earlier appointment. Phone # [phone]    Disposition   No disposition on file.

## 2025-08-14 ENCOUNTER — OFFICE VISIT (OUTPATIENT)
Dept: HEMATOLOGY/ONCOLOGY | Facility: CLINIC | Age: 75
End: 2025-08-14
Payer: MEDICARE

## 2025-08-14 VITALS
RESPIRATION RATE: 18 BRPM | HEART RATE: 74 BPM | DIASTOLIC BLOOD PRESSURE: 70 MMHG | OXYGEN SATURATION: 98 % | WEIGHT: 127.6 LBS | TEMPERATURE: 95.9 F | SYSTOLIC BLOOD PRESSURE: 145 MMHG | BODY MASS INDEX: 19.86 KG/M2

## 2025-08-14 DIAGNOSIS — C18.7 MALIGNANT NEOPLASM OF SIGMOID COLON (MULTI): Primary | ICD-10-CM

## 2025-08-14 DIAGNOSIS — C18.7 MALIGNANT NEOPLASM OF SIGMOID COLON (MULTI): ICD-10-CM

## 2025-08-14 LAB
ALBUMIN SERPL BCP-MCNC: 3.9 G/DL (ref 3.4–5)
ALP SERPL-CCNC: 104 U/L (ref 33–136)
ALT SERPL W P-5'-P-CCNC: 8 U/L (ref 7–45)
ANION GAP SERPL CALC-SCNC: 11 MMOL/L (ref 10–20)
AST SERPL W P-5'-P-CCNC: 13 U/L (ref 9–39)
BASOPHILS # BLD AUTO: 0.02 X10*3/UL (ref 0–0.1)
BASOPHILS NFR BLD AUTO: 0.3 %
BILIRUB SERPL-MCNC: 0.4 MG/DL (ref 0–1.2)
BUN SERPL-MCNC: 14 MG/DL (ref 6–23)
CALCIUM SERPL-MCNC: 9.3 MG/DL (ref 8.6–10.3)
CEA SERPL-MCNC: 9.5 UG/L
CHLORIDE SERPL-SCNC: 103 MMOL/L (ref 98–107)
CO2 SERPL-SCNC: 28 MMOL/L (ref 21–32)
CREAT SERPL-MCNC: 0.78 MG/DL (ref 0.5–1.05)
EGFRCR SERPLBLD CKD-EPI 2021: 80 ML/MIN/1.73M*2
EOSINOPHIL # BLD AUTO: 0.21 X10*3/UL (ref 0–0.4)
EOSINOPHIL NFR BLD AUTO: 3.4 %
ERYTHROCYTE [DISTWIDTH] IN BLOOD BY AUTOMATED COUNT: 12.6 % (ref 11.5–14.5)
GLUCOSE SERPL-MCNC: 79 MG/DL (ref 74–99)
HCT VFR BLD AUTO: 45 % (ref 36–46)
HGB BLD-MCNC: 14.9 G/DL (ref 12–16)
IMM GRANULOCYTES # BLD AUTO: 0.01 X10*3/UL (ref 0–0.5)
IMM GRANULOCYTES NFR BLD AUTO: 0.2 % (ref 0–0.9)
LYMPHOCYTES # BLD AUTO: 1.52 X10*3/UL (ref 0.8–3)
LYMPHOCYTES NFR BLD AUTO: 24.9 %
MCH RBC QN AUTO: 31.5 PG (ref 26–34)
MCHC RBC AUTO-ENTMCNC: 33.1 G/DL (ref 32–36)
MCV RBC AUTO: 95 FL (ref 80–100)
MONOCYTES # BLD AUTO: 0.61 X10*3/UL (ref 0.05–0.8)
MONOCYTES NFR BLD AUTO: 10 %
NEUTROPHILS # BLD AUTO: 3.73 X10*3/UL (ref 1.6–5.5)
NEUTROPHILS NFR BLD AUTO: 61.2 %
NRBC BLD-RTO: NORMAL /100{WBCS}
PLATELET # BLD AUTO: 234 X10*3/UL (ref 150–450)
POTASSIUM SERPL-SCNC: 3.8 MMOL/L (ref 3.5–5.3)
PROT SERPL-MCNC: 6.8 G/DL (ref 6.4–8.2)
RBC # BLD AUTO: 4.73 X10*6/UL (ref 4–5.2)
SODIUM SERPL-SCNC: 138 MMOL/L (ref 136–145)
WBC # BLD AUTO: 6.1 X10*3/UL (ref 4.4–11.3)

## 2025-08-14 PROCEDURE — 1160F RVW MEDS BY RX/DR IN RCRD: CPT | Performed by: INTERNAL MEDICINE

## 2025-08-14 PROCEDURE — 85025 COMPLETE CBC W/AUTO DIFF WBC: CPT | Performed by: INTERNAL MEDICINE

## 2025-08-14 PROCEDURE — 99215 OFFICE O/P EST HI 40 MIN: CPT | Performed by: INTERNAL MEDICINE

## 2025-08-14 PROCEDURE — 1159F MED LIST DOCD IN RCRD: CPT | Performed by: INTERNAL MEDICINE

## 2025-08-14 PROCEDURE — 36415 COLL VENOUS BLD VENIPUNCTURE: CPT | Performed by: INTERNAL MEDICINE

## 2025-08-14 PROCEDURE — 82378 CARCINOEMBRYONIC ANTIGEN: CPT | Mod: PORLAB | Performed by: INTERNAL MEDICINE

## 2025-08-14 PROCEDURE — 1126F AMNT PAIN NOTED NONE PRSNT: CPT | Performed by: INTERNAL MEDICINE

## 2025-08-14 PROCEDURE — 80053 COMPREHEN METABOLIC PANEL: CPT | Performed by: INTERNAL MEDICINE

## 2025-08-14 ASSESSMENT — PAIN SCALES - GENERAL: PAINLEVEL_OUTOF10: 0-NO PAIN

## 2025-08-19 ENCOUNTER — OFFICE VISIT (OUTPATIENT)
Dept: SURGERY | Facility: CLINIC | Age: 75
End: 2025-08-19
Payer: MEDICARE

## 2025-08-19 VITALS
SYSTOLIC BLOOD PRESSURE: 141 MMHG | DIASTOLIC BLOOD PRESSURE: 77 MMHG | HEART RATE: 97 BPM | WEIGHT: 127 LBS | OXYGEN SATURATION: 97 % | HEIGHT: 69 IN | TEMPERATURE: 98.1 F | BODY MASS INDEX: 18.81 KG/M2

## 2025-08-19 DIAGNOSIS — C18.7 MALIGNANT NEOPLASM OF SIGMOID COLON (MULTI): ICD-10-CM

## 2025-08-19 PROCEDURE — 3008F BODY MASS INDEX DOCD: CPT | Performed by: SURGERY

## 2025-08-19 PROCEDURE — 99215 OFFICE O/P EST HI 40 MIN: CPT | Performed by: SURGERY

## 2025-08-19 PROCEDURE — 1125F AMNT PAIN NOTED PAIN PRSNT: CPT | Performed by: SURGERY

## 2025-08-19 RX ORDER — OXYCODONE HYDROCHLORIDE 5 MG/1
5 TABLET ORAL EVERY 6 HOURS PRN
Qty: 15 TABLET | Refills: 0 | Status: SHIPPED | OUTPATIENT
Start: 2025-08-19

## 2025-08-19 RX ORDER — POLYETHYLENE GLYCOL 3350 17 G/17G
17 POWDER, FOR SOLUTION ORAL DAILY
Qty: 30 PACKET | Refills: 0 | Status: SHIPPED | OUTPATIENT
Start: 2025-08-19 | End: 2025-09-18

## 2025-08-19 RX ORDER — ONDANSETRON 4 MG/1
4 TABLET, ORALLY DISINTEGRATING ORAL EVERY 8 HOURS PRN
Qty: 20 TABLET | Refills: 0 | Status: SHIPPED | OUTPATIENT
Start: 2025-08-19 | End: 2025-08-26

## 2025-08-19 ASSESSMENT — PAIN SCALES - GENERAL: PAINLEVEL_OUTOF10: 8

## 2025-08-20 ENCOUNTER — OFFICE VISIT (OUTPATIENT)
Dept: SURGICAL ONCOLOGY | Facility: HOSPITAL | Age: 75
End: 2025-08-20
Payer: MEDICARE

## 2025-08-20 VITALS
OXYGEN SATURATION: 97 % | BODY MASS INDEX: 18.83 KG/M2 | HEART RATE: 90 BPM | SYSTOLIC BLOOD PRESSURE: 119 MMHG | RESPIRATION RATE: 14 BRPM | TEMPERATURE: 97 F | WEIGHT: 127.5 LBS | DIASTOLIC BLOOD PRESSURE: 63 MMHG

## 2025-08-20 DIAGNOSIS — C78.6 MALIGNANT NEOPLASM OF COLON METASTATIC TO PERITONEUM (MULTI): Primary | ICD-10-CM

## 2025-08-20 DIAGNOSIS — C18.9 MALIGNANT NEOPLASM OF COLON METASTATIC TO PERITONEUM (MULTI): Primary | ICD-10-CM

## 2025-08-20 PROCEDURE — 99204 OFFICE O/P NEW MOD 45 MIN: CPT | Performed by: STUDENT IN AN ORGANIZED HEALTH CARE EDUCATION/TRAINING PROGRAM

## 2025-08-20 PROCEDURE — 1125F AMNT PAIN NOTED PAIN PRSNT: CPT | Performed by: STUDENT IN AN ORGANIZED HEALTH CARE EDUCATION/TRAINING PROGRAM

## 2025-08-20 PROCEDURE — 99214 OFFICE O/P EST MOD 30 MIN: CPT | Mod: 57 | Performed by: STUDENT IN AN ORGANIZED HEALTH CARE EDUCATION/TRAINING PROGRAM

## 2025-08-20 RX ORDER — ENOXAPARIN SODIUM 300 MG/3ML
40 INJECTION INTRAVENOUS; SUBCUTANEOUS ONCE
OUTPATIENT
Start: 2025-08-20 | End: 2025-08-20

## 2025-08-20 RX ORDER — METRONIDAZOLE 500 MG/100ML
500 INJECTION, SOLUTION INTRAVENOUS ONCE
OUTPATIENT
Start: 2025-08-20 | End: 2025-08-20

## 2025-08-20 ASSESSMENT — PAIN SCALES - GENERAL: PAINLEVEL_OUTOF10: 8

## 2025-08-21 ENCOUNTER — NUTRITION (OUTPATIENT)
Dept: HEMATOLOGY/ONCOLOGY | Facility: HOSPITAL | Age: 75
End: 2025-08-21
Payer: MEDICARE

## 2025-08-21 VITALS — HEIGHT: 69 IN | BODY MASS INDEX: 18.82 KG/M2

## 2025-08-21 DIAGNOSIS — C18.9 MALIGNANT NEOPLASM OF COLON METASTATIC TO PERITONEUM (MULTI): ICD-10-CM

## 2025-08-21 DIAGNOSIS — C78.6 MALIGNANT NEOPLASM OF COLON METASTATIC TO PERITONEUM (MULTI): ICD-10-CM

## 2025-08-22 RX ORDER — CHLORHEXIDINE GLUCONATE 40 MG/ML
SOLUTION TOPICAL
Qty: 118 ML | Refills: 0 | Status: ON HOLD | OUTPATIENT
Start: 2025-08-22

## 2025-08-22 RX ORDER — METRONIDAZOLE 250 MG/1
TABLET ORAL
Qty: 3 TABLET | Refills: 0 | Status: ON HOLD | OUTPATIENT
Start: 2025-08-22

## 2025-08-22 RX ORDER — GABAPENTIN 100 MG/1
CAPSULE ORAL
Qty: 3 CAPSULE | Refills: 0 | Status: ON HOLD | OUTPATIENT
Start: 2025-08-22

## 2025-08-22 RX ORDER — NEOMYCIN SULFATE 500 MG/1
TABLET ORAL
Qty: 6 TABLET | Refills: 0 | Status: ON HOLD | OUTPATIENT
Start: 2025-08-22

## 2025-08-22 RX ORDER — CHLORHEXIDINE GLUCONATE ORAL RINSE 1.2 MG/ML
SOLUTION DENTAL
Qty: 120 ML | Refills: 0 | Status: ON HOLD | OUTPATIENT
Start: 2025-08-22

## 2025-08-25 ENCOUNTER — PRE-ADMISSION TESTING (OUTPATIENT)
Dept: PREADMISSION TESTING | Facility: HOSPITAL | Age: 75
End: 2025-08-25
Payer: MEDICARE

## 2025-08-25 VITALS
OXYGEN SATURATION: 98 % | BODY MASS INDEX: 18.9 KG/M2 | HEART RATE: 89 BPM | TEMPERATURE: 98.7 F | HEIGHT: 69 IN | WEIGHT: 127.6 LBS | DIASTOLIC BLOOD PRESSURE: 74 MMHG | SYSTOLIC BLOOD PRESSURE: 128 MMHG

## 2025-08-25 DIAGNOSIS — C78.6 MALIGNANT NEOPLASM OF COLON METASTATIC TO PERITONEUM (MULTI): ICD-10-CM

## 2025-08-25 DIAGNOSIS — C18.9 MALIGNANT NEOPLASM OF COLON METASTATIC TO PERITONEUM (MULTI): ICD-10-CM

## 2025-08-25 DIAGNOSIS — F17.200 TOBACCO USE DISORDER: ICD-10-CM

## 2025-08-25 DIAGNOSIS — Z01.818 PREOPERATIVE EXAMINATION: Primary | ICD-10-CM

## 2025-08-25 LAB
ABO GROUP (TYPE) IN BLOOD: NORMAL
ANTIBODY SCREEN: NORMAL
RH FACTOR (ANTIGEN D): NORMAL

## 2025-08-25 PROCEDURE — 99406 BEHAV CHNG SMOKING 3-10 MIN: CPT

## 2025-08-25 PROCEDURE — 87081 CULTURE SCREEN ONLY: CPT

## 2025-08-25 PROCEDURE — 86901 BLOOD TYPING SEROLOGIC RH(D): CPT

## 2025-08-25 PROCEDURE — 36415 COLL VENOUS BLD VENIPUNCTURE: CPT

## 2025-08-25 PROCEDURE — 99204 OFFICE O/P NEW MOD 45 MIN: CPT

## 2025-08-25 ASSESSMENT — ENCOUNTER SYMPTOMS
TROUBLE SWALLOWING: 0
WOUND: 0
BRUISES/BLEEDS EASILY: 0
LIGHT-HEADEDNESS: 0
NECK MASS: 0
DYSPNEA WITH EXERTION: 0
NECK PAIN: 0
EYE DISCHARGE: 0
MYALGIAS: 0
SKIN CHANGES: 0
NECK STIFFNESS: 0
ARTHRALGIAS: 0
SINUS CONGESTION: 0
ABDOMINAL DISTENTION: 1
NAUSEA: 0
FEVER: 0
PND: 0
CHILLS: 0
HEMOPTYSIS: 0
ABDOMINAL PAIN: 1
POLYDIPSIA: 0
CONFUSION: 0
BLOOD IN STOOL: 0
UNEXPECTED WEIGHT CHANGE: 0
DYSPNEA AT REST: 0
NECK SWELLING: 0
WHEEZING: 0
DYSURIA: 0
LIMITED RANGE OF MOTION: 0
VISUAL CHANGE: 0
DOUBLE VISION: 0
NERVOUS/ANXIOUS: 0
RHINORRHEA: 0
VOMITING: 0
PALPITATIONS: 0
VOICE CHANGE: 0
SHORTNESS OF BREATH: 0
COUGH: 0
DIFFICULTY URINATING: 0
VERTIGO: 0
TREMORS: 0
DIARRHEA: 0
NUMBNESS: 0
EXCESSIVE BLEEDING: 0
JOINT SWELLING: 0
WEAKNESS: 0
CONSTIPATION: 0

## 2025-08-25 ASSESSMENT — DUKE ACTIVITY SCORE INDEX (DASI)
CAN YOU DO MODERATE WORK AROUND THE HOUSE LIKE VACUUMING, SWEEPING FLOORS OR CARRYING GROCERIES: YES
CAN YOU DO YARD WORK LIKE RAKING LEAVES, WEEDING OR PUSHING A MOWER: YES
CAN YOU DO LIGHT WORK AROUND THE HOUSE LIKE DUSTING OR WASHING DISHES: YES
CAN YOU TAKE CARE OF YOURSELF (EAT, DRESS, BATHE, OR USE TOILET): YES
CAN YOU WALK INDOORS, SUCH AS AROUND YOUR HOUSE: YES
TOTAL_SCORE: 34.7
CAN YOU CLIMB A FLIGHT OF STAIRS OR WALK UP A HILL: YES
CAN YOU RUN A SHORT DISTANCE: NO
CAN YOU WALK A BLOCK OR TWO ON LEVEL GROUND: YES
CAN YOU PARTICIPATE IN STRENOUS SPORTS LIKE SWIMMING, SINGLES TENNIS, FOOTBALL, BASKETBALL, OR SKIING: NO
CAN YOU HAVE SEXUAL RELATIONS: YES
CAN YOU PARTICIPATE IN MODERATE RECREATIONAL ACTIVITIES LIKE GOLF, BOWLING, DANCING, DOUBLES TENNIS OR THROWING A BASEBALL OR FOOTBALL: YES
DASI METS SCORE: 7
CAN YOU DO HEAVY WORK AROUND THE HOUSE LIKE SCRUBBING FLOORS OR LIFTING AND MOVING HEAVY FURNITURE: NO

## 2025-08-25 ASSESSMENT — LIFESTYLE VARIABLES: SMOKING_STATUS: SMOKER

## 2025-08-26 ENCOUNTER — APPOINTMENT (OUTPATIENT)
Dept: SURGICAL ONCOLOGY | Facility: CLINIC | Age: 75
End: 2025-08-26
Payer: MEDICARE

## 2025-08-26 LAB — STAPHYLOCOCCUS SPEC CULT: NORMAL

## 2025-08-27 ENCOUNTER — APPOINTMENT (OUTPATIENT)
Dept: HEMATOLOGY/ONCOLOGY | Facility: HOSPITAL | Age: 75
End: 2025-08-27
Payer: MEDICARE

## 2025-08-28 ENCOUNTER — ANESTHESIA EVENT (OUTPATIENT)
Dept: OPERATING ROOM | Facility: HOSPITAL | Age: 75
End: 2025-08-28
Payer: MEDICARE

## 2025-08-28 ENCOUNTER — APPOINTMENT (OUTPATIENT)
Dept: SURGICAL ONCOLOGY | Facility: HOSPITAL | Age: 75
End: 2025-08-28
Payer: MEDICARE

## 2025-08-29 ENCOUNTER — APPOINTMENT (OUTPATIENT)
Dept: RADIOLOGY | Facility: HOSPITAL | Age: 75
End: 2025-08-29
Payer: MEDICARE

## 2025-08-29 ENCOUNTER — HOSPITAL ENCOUNTER (INPATIENT)
Facility: HOSPITAL | Age: 75
End: 2025-08-29
Attending: STUDENT IN AN ORGANIZED HEALTH CARE EDUCATION/TRAINING PROGRAM | Admitting: STUDENT IN AN ORGANIZED HEALTH CARE EDUCATION/TRAINING PROGRAM
Payer: MEDICARE

## 2025-08-29 ENCOUNTER — ANESTHESIA (OUTPATIENT)
Dept: OPERATING ROOM | Facility: HOSPITAL | Age: 75
End: 2025-08-29
Payer: MEDICARE

## 2025-08-29 PROCEDURE — 71045 X-RAY EXAM CHEST 1 VIEW: CPT

## 2025-08-29 PROCEDURE — 2500000005 HC RX 250 GENERAL PHARMACY W/O HCPCS

## 2025-08-29 PROCEDURE — 2500000004 HC RX 250 GENERAL PHARMACY W/ HCPCS (ALT 636 FOR OP/ED): Mod: JZ,TB

## 2025-08-29 PROCEDURE — 2500000004 HC RX 250 GENERAL PHARMACY W/ HCPCS (ALT 636 FOR OP/ED)

## 2025-08-29 PROCEDURE — P9045 ALBUMIN (HUMAN), 5%, 250 ML: HCPCS | Mod: JZ,TB

## 2025-08-29 PROCEDURE — 2500000004 HC RX 250 GENERAL PHARMACY W/ HCPCS (ALT 636 FOR OP/ED): Performed by: STUDENT IN AN ORGANIZED HEALTH CARE EDUCATION/TRAINING PROGRAM

## 2025-08-29 RX ORDER — PHENYLEPHRINE 10 MG/250 ML(40 MCG/ML)IN 0.9 % SOD.CHLORIDE INTRAVENOUS
CONTINUOUS PRN
Status: DISCONTINUED | OUTPATIENT
Start: 2025-08-29 | End: 2025-08-29

## 2025-08-29 RX ORDER — ROPIVACAINE HCL/PF 100MG/20ML
SYRINGE (ML) INJECTION AS NEEDED
Status: DISCONTINUED | OUTPATIENT
Start: 2025-08-29 | End: 2025-08-29

## 2025-08-29 RX ORDER — LIDOCAINE HYDROCHLORIDE 20 MG/ML
INJECTION, SOLUTION INFILTRATION; PERINEURAL AS NEEDED
Status: DISCONTINUED | OUTPATIENT
Start: 2025-08-29 | End: 2025-08-29

## 2025-08-29 RX ORDER — ALBUMIN HUMAN 50 G/1000ML
SOLUTION INTRAVENOUS AS NEEDED
Status: DISCONTINUED | OUTPATIENT
Start: 2025-08-29 | End: 2025-08-29

## 2025-08-29 RX ORDER — PHENYLEPHRINE HCL IN 0.9% NACL 0.4MG/10ML
SYRINGE (ML) INTRAVENOUS AS NEEDED
Status: DISCONTINUED | OUTPATIENT
Start: 2025-08-29 | End: 2025-08-29

## 2025-08-29 RX ORDER — CEFAZOLIN 1 G/1
INJECTION, POWDER, FOR SOLUTION INTRAVENOUS AS NEEDED
Status: DISCONTINUED | OUTPATIENT
Start: 2025-08-29 | End: 2025-08-29

## 2025-08-29 RX ORDER — NORETHINDRONE AND ETHINYL ESTRADIOL 0.5-0.035
KIT ORAL AS NEEDED
Status: DISCONTINUED | OUTPATIENT
Start: 2025-08-29 | End: 2025-08-29

## 2025-08-29 RX ORDER — FENTANYL CITRATE 50 UG/ML
INJECTION, SOLUTION INTRAMUSCULAR; INTRAVENOUS AS NEEDED
Status: DISCONTINUED | OUTPATIENT
Start: 2025-08-29 | End: 2025-08-29

## 2025-08-29 RX ORDER — SODIUM CHLORIDE, SODIUM LACTATE, POTASSIUM CHLORIDE, CALCIUM CHLORIDE 600; 310; 30; 20 MG/100ML; MG/100ML; MG/100ML; MG/100ML
INJECTION, SOLUTION INTRAVENOUS CONTINUOUS PRN
Status: DISCONTINUED | OUTPATIENT
Start: 2025-08-29 | End: 2025-08-29

## 2025-08-29 RX ORDER — MIDAZOLAM HYDROCHLORIDE 2 MG/2ML
INJECTION, SOLUTION INTRAMUSCULAR; INTRAVENOUS AS NEEDED
Status: DISCONTINUED | OUTPATIENT
Start: 2025-08-29 | End: 2025-08-29

## 2025-08-29 RX ORDER — PROPOFOL 10 MG/ML
INJECTION, EMULSION INTRAVENOUS AS NEEDED
Status: DISCONTINUED | OUTPATIENT
Start: 2025-08-29 | End: 2025-08-29

## 2025-08-29 RX ORDER — MAGNESIUM SULFATE HEPTAHYDRATE 500 MG/ML
INJECTION, SOLUTION INTRAMUSCULAR; INTRAVENOUS AS NEEDED
Status: DISCONTINUED | OUTPATIENT
Start: 2025-08-29 | End: 2025-08-29

## 2025-08-29 RX ORDER — ROCURONIUM BROMIDE 10 MG/ML
INJECTION, SOLUTION INTRAVENOUS AS NEEDED
Status: DISCONTINUED | OUTPATIENT
Start: 2025-08-29 | End: 2025-08-29

## 2025-08-29 RX ORDER — CALCIUM CHLORIDE INJECTION 100 MG/ML
INJECTION, SOLUTION INTRAVENOUS AS NEEDED
Status: DISCONTINUED | OUTPATIENT
Start: 2025-08-29 | End: 2025-08-29

## 2025-08-29 RX ORDER — HEPARIN SODIUM 5000 [USP'U]/ML
INJECTION, SOLUTION INTRAVENOUS; SUBCUTANEOUS AS NEEDED
Status: DISCONTINUED | OUTPATIENT
Start: 2025-08-29 | End: 2025-08-29

## 2025-08-29 RX ORDER — ACETAMINOPHEN 10 MG/ML
INJECTION, SOLUTION INTRAVENOUS AS NEEDED
Status: DISCONTINUED | OUTPATIENT
Start: 2025-08-29 | End: 2025-08-29

## 2025-08-29 RX ADMIN — HEPARIN SODIUM 5000 UNITS: 5000 INJECTION INTRAVENOUS; SUBCUTANEOUS at 08:24

## 2025-08-29 RX ADMIN — ROCURONIUM BROMIDE 30 MG: 10 INJECTION INTRAVENOUS at 09:48

## 2025-08-29 RX ADMIN — PHENYLEPHRINE-NACL IV SOLUTION 10 MG/250ML-0.9% 0.3 MCG/KG/MIN: 10-0.9/25 SOLUTION at 12:36

## 2025-08-29 RX ADMIN — EPHEDRINE SULFATE 5 MG: 50 INJECTION INTRAVENOUS at 12:39

## 2025-08-29 RX ADMIN — EPHEDRINE SULFATE 5 MG: 50 INJECTION INTRAVENOUS at 12:52

## 2025-08-29 RX ADMIN — Medication 120 MCG: at 11:06

## 2025-08-29 RX ADMIN — ROCURONIUM BROMIDE 20 MG: 10 INJECTION INTRAVENOUS at 09:21

## 2025-08-29 RX ADMIN — EPHEDRINE SULFATE 5 MG: 50 INJECTION INTRAVENOUS at 12:23

## 2025-08-29 RX ADMIN — CALCIUM CHLORIDE 1 G: 100 INJECTION INTRAVENOUS; INTRAVENTRICULAR at 12:30

## 2025-08-29 RX ADMIN — MITOMYCIN 30 MG: 20 INJECTION, POWDER, LYOPHILIZED, FOR SOLUTION INTRAVENOUS at 11:27

## 2025-08-29 RX ADMIN — MIDAZOLAM HYDROCHLORIDE 2 MG: 2 INJECTION, SOLUTION INTRAMUSCULAR; INTRAVENOUS at 06:50

## 2025-08-29 RX ADMIN — Medication 30 ML: at 06:40

## 2025-08-29 RX ADMIN — MAGNESIUM SULFATE HEPTAHYDRATE 2 G: 500 INJECTION, SOLUTION INTRAMUSCULAR; INTRAVENOUS at 14:21

## 2025-08-29 RX ADMIN — ROCURONIUM BROMIDE 10 MG: 10 INJECTION INTRAVENOUS at 14:04

## 2025-08-29 RX ADMIN — Medication 25 MG: at 12:50

## 2025-08-29 RX ADMIN — FENTANYL CITRATE 25 MCG: 50 INJECTION, SOLUTION INTRAMUSCULAR; INTRAVENOUS at 14:42

## 2025-08-29 RX ADMIN — Medication 80 MCG: at 14:33

## 2025-08-29 RX ADMIN — LIDOCAINE HYDROCHLORIDE 50 MG: 20 INJECTION, SOLUTION INFILTRATION; PERINEURAL at 07:52

## 2025-08-29 RX ADMIN — Medication 160 MCG: at 12:18

## 2025-08-29 RX ADMIN — ROCURONIUM BROMIDE 50 MG: 10 INJECTION INTRAVENOUS at 07:53

## 2025-08-29 RX ADMIN — PROPOFOL 30 MG: 10 INJECTION, EMULSION INTRAVENOUS at 08:32

## 2025-08-29 RX ADMIN — FENTANYL CITRATE 50 MCG: 50 INJECTION, SOLUTION INTRAMUSCULAR; INTRAVENOUS at 08:32

## 2025-08-29 RX ADMIN — EPHEDRINE SULFATE 5 MG: 50 INJECTION INTRAVENOUS at 12:32

## 2025-08-29 RX ADMIN — ROCURONIUM BROMIDE 30 MG: 10 INJECTION INTRAVENOUS at 10:53

## 2025-08-29 RX ADMIN — PROPOFOL 80 MG: 10 INJECTION, EMULSION INTRAVENOUS at 07:52

## 2025-08-29 RX ADMIN — PROPOFOL 20 MG: 10 INJECTION, EMULSION INTRAVENOUS at 13:18

## 2025-08-29 RX ADMIN — METRONIDAZOLE 500 MG: 5 INJECTION, SOLUTION INTRAVENOUS at 08:00

## 2025-08-29 RX ADMIN — ROCURONIUM BROMIDE 20 MG: 10 INJECTION INTRAVENOUS at 12:43

## 2025-08-29 RX ADMIN — ALBUMIN HUMAN 250 ML: 0.05 INJECTION, SOLUTION INTRAVENOUS at 12:03

## 2025-08-29 RX ADMIN — Medication 120 MCG: at 12:23

## 2025-08-29 RX ADMIN — Medication 80 MCG: at 09:19

## 2025-08-29 RX ADMIN — ACETAMINOPHEN 1000 MG: 10 INJECTION, SOLUTION INTRAVENOUS at 13:43

## 2025-08-29 RX ADMIN — Medication 25 MG: at 13:29

## 2025-08-29 RX ADMIN — FENTANYL CITRATE 50 MCG: 50 INJECTION, SOLUTION INTRAMUSCULAR; INTRAVENOUS at 07:52

## 2025-08-29 RX ADMIN — CEFAZOLIN 2 G: 1 INJECTION, POWDER, FOR SOLUTION INTRAMUSCULAR; INTRAVENOUS at 12:00

## 2025-08-29 RX ADMIN — SODIUM CHLORIDE, POTASSIUM CHLORIDE, SODIUM LACTATE AND CALCIUM CHLORIDE: 600; 310; 30; 20 INJECTION, SOLUTION INTRAVENOUS at 13:16

## 2025-08-29 RX ADMIN — Medication 160 MCG: at 12:09

## 2025-08-29 RX ADMIN — Medication 80 MCG: at 14:20

## 2025-08-29 RX ADMIN — SODIUM CHLORIDE: 9 INJECTION, SOLUTION INTRAVENOUS at 09:49

## 2025-08-29 RX ADMIN — ALBUMIN HUMAN 250 ML: 0.05 INJECTION, SOLUTION INTRAVENOUS at 09:57

## 2025-08-29 RX ADMIN — CEFAZOLIN 2 G: 1 INJECTION, POWDER, FOR SOLUTION INTRAMUSCULAR; INTRAVENOUS at 08:00

## 2025-08-29 RX ADMIN — Medication 80 MCG: at 09:17

## 2025-08-29 RX ADMIN — SODIUM CHLORIDE, SODIUM LACTATE, POTASSIUM CHLORIDE, AND CALCIUM CHLORIDE: 600; 310; 30; 20 INJECTION, SOLUTION INTRAVENOUS at 07:52

## 2025-08-29 RX ADMIN — ROCURONIUM BROMIDE 30 MG: 10 INJECTION INTRAVENOUS at 13:13

## 2025-08-29 RX ADMIN — DEXAMETHASONE SODIUM PHOSPHATE 4 MG: 4 INJECTION INTRA-ARTICULAR; INTRALESIONAL; INTRAMUSCULAR; INTRAVENOUS; SOFT TISSUE at 08:37

## 2025-08-29 RX ADMIN — ROCURONIUM BROMIDE 20 MG: 10 INJECTION INTRAVENOUS at 08:31

## 2025-08-29 RX ADMIN — Medication 160 MCG: at 12:01

## 2025-08-29 RX ADMIN — ROCURONIUM BROMIDE 20 MG: 10 INJECTION INTRAVENOUS at 11:55

## 2025-08-29 RX ADMIN — Medication 14 ML/HR: at 09:04

## 2025-08-29 RX ADMIN — MITOMYCIN 10 MG: 5 INJECTION, POWDER, LYOPHILIZED, FOR SOLUTION INTRAVENOUS at 12:27

## 2025-08-29 RX ADMIN — Medication 80 MCG: at 08:08

## 2025-08-29 RX ADMIN — Medication 120 MCG: at 09:26

## 2025-08-29 RX ADMIN — Medication 120 MCG: at 12:39

## 2025-08-29 RX ADMIN — ALBUMIN HUMAN 250 ML: 0.05 INJECTION, SOLUTION INTRAVENOUS at 09:31

## 2025-08-29 SDOH — HEALTH STABILITY: MENTAL HEALTH: CURRENT SMOKER: 1

## 2025-08-29 ASSESSMENT — PAIN - FUNCTIONAL ASSESSMENT
PAIN_FUNCTIONAL_ASSESSMENT: 0-10

## 2025-08-29 ASSESSMENT — PAIN DESCRIPTION - LOCATION
LOCATION: ABDOMEN

## 2025-08-29 ASSESSMENT — COGNITIVE AND FUNCTIONAL STATUS - GENERAL
MOVING TO AND FROM BED TO CHAIR: A LITTLE
MOBILITY SCORE: 20
HELP NEEDED FOR BATHING: A LITTLE
WALKING IN HOSPITAL ROOM: A LITTLE
DAILY ACTIVITIY SCORE: 20
TOILETING: A LITTLE
STANDING UP FROM CHAIR USING ARMS: A LITTLE
CLIMB 3 TO 5 STEPS WITH RAILING: A LITTLE
PATIENT BASELINE BEDBOUND: NO
DRESSING REGULAR LOWER BODY CLOTHING: A LITTLE
DRESSING REGULAR UPPER BODY CLOTHING: A LITTLE

## 2025-08-29 ASSESSMENT — PAIN SCALES - GENERAL
PAINLEVEL_OUTOF10: 6
PAINLEVEL_OUTOF10: 4
PAINLEVEL_OUTOF10: 9
PAINLEVEL_OUTOF10: 8
PAINLEVEL_OUTOF10: 6
PAINLEVEL_OUTOF10: 4
PAINLEVEL_OUTOF10: 5 - MODERATE PAIN
PAIN_LEVEL: 0
PAINLEVEL_OUTOF10: 8
PAINLEVEL_OUTOF10: 8
PAINLEVEL_OUTOF10: 4
PAINLEVEL_OUTOF10: 10 - WORST POSSIBLE PAIN
PAINLEVEL_OUTOF10: 8
PAINLEVEL_OUTOF10: 10 - WORST POSSIBLE PAIN
PAINLEVEL_OUTOF10: 4
PAINLEVEL_OUTOF10: 8
PAINLEVEL_OUTOF10: 6
PAINLEVEL_OUTOF10: 7
PAINLEVEL_OUTOF10: 10 - WORST POSSIBLE PAIN

## 2025-08-29 ASSESSMENT — PAIN DESCRIPTION - ORIENTATION: ORIENTATION: MID

## 2025-08-29 ASSESSMENT — PAIN DESCRIPTION - DESCRIPTORS: DESCRIPTORS: CRAMPING;JABBING

## 2025-08-30 ENCOUNTER — OFFICE VISIT (OUTPATIENT)
Dept: SURGICAL ONCOLOGY | Facility: HOSPITAL | Age: 75
End: 2025-08-30
Payer: MEDICARE

## 2025-08-30 ENCOUNTER — APPOINTMENT (OUTPATIENT)
Dept: RADIOLOGY | Facility: HOSPITAL | Age: 75
End: 2025-08-30
Payer: MEDICARE

## 2025-08-30 PROCEDURE — 93005 ELECTROCARDIOGRAM TRACING: CPT

## 2025-08-30 PROCEDURE — 71045 X-RAY EXAM CHEST 1 VIEW: CPT

## 2025-08-30 SDOH — ECONOMIC STABILITY: INCOME INSECURITY: IN THE PAST 12 MONTHS HAS THE ELECTRIC, GAS, OIL, OR WATER COMPANY THREATENED TO SHUT OFF SERVICES IN YOUR HOME?: NO

## 2025-08-30 SDOH — SOCIAL STABILITY: SOCIAL INSECURITY: DO YOU FEEL ANYONE HAS EXPLOITED OR TAKEN ADVANTAGE OF YOU FINANCIALLY OR OF YOUR PERSONAL PROPERTY?: NO

## 2025-08-30 SDOH — HEALTH STABILITY: MENTAL HEALTH: HOW OFTEN DO YOU HAVE A DRINK CONTAINING ALCOHOL?: NEVER

## 2025-08-30 SDOH — HEALTH STABILITY: MENTAL HEALTH: HOW OFTEN DO YOU HAVE SIX OR MORE DRINKS ON ONE OCCASION?: NEVER

## 2025-08-30 SDOH — SOCIAL STABILITY: SOCIAL INSECURITY: HAVE YOU HAD ANY THOUGHTS OF HARMING ANYONE ELSE?: NO

## 2025-08-30 SDOH — HEALTH STABILITY: MENTAL HEALTH: HOW MANY DRINKS CONTAINING ALCOHOL DO YOU HAVE ON A TYPICAL DAY WHEN YOU ARE DRINKING?: PATIENT DOES NOT DRINK

## 2025-08-30 SDOH — ECONOMIC STABILITY: FOOD INSECURITY: WITHIN THE PAST 12 MONTHS, THE FOOD YOU BOUGHT JUST DIDN'T LAST AND YOU DIDN'T HAVE MONEY TO GET MORE.: NEVER TRUE

## 2025-08-30 SDOH — SOCIAL STABILITY: SOCIAL INSECURITY: ARE YOU OR HAVE YOU BEEN THREATENED OR ABUSED PHYSICALLY, EMOTIONALLY, OR SEXUALLY BY ANYONE?: NO

## 2025-08-30 SDOH — SOCIAL STABILITY: SOCIAL INSECURITY: HAS ANYONE EVER THREATENED TO HURT YOUR FAMILY OR YOUR PETS?: NO

## 2025-08-30 SDOH — SOCIAL STABILITY: SOCIAL INSECURITY: WITHIN THE LAST YEAR, HAVE YOU BEEN AFRAID OF YOUR PARTNER OR EX-PARTNER?: NO

## 2025-08-30 SDOH — ECONOMIC STABILITY: FOOD INSECURITY: WITHIN THE PAST 12 MONTHS, YOU WORRIED THAT YOUR FOOD WOULD RUN OUT BEFORE YOU GOT THE MONEY TO BUY MORE.: NEVER TRUE

## 2025-08-30 SDOH — SOCIAL STABILITY: SOCIAL INSECURITY: WITHIN THE LAST YEAR, HAVE YOU BEEN HUMILIATED OR EMOTIONALLY ABUSED IN OTHER WAYS BY YOUR PARTNER OR EX-PARTNER?: NO

## 2025-08-30 SDOH — SOCIAL STABILITY: SOCIAL INSECURITY: DO YOU FEEL UNSAFE GOING BACK TO THE PLACE WHERE YOU ARE LIVING?: NO

## 2025-08-30 SDOH — SOCIAL STABILITY: SOCIAL INSECURITY
WITHIN THE LAST YEAR, HAVE YOU BEEN RAPED OR FORCED TO HAVE ANY KIND OF SEXUAL ACTIVITY BY YOUR PARTNER OR EX-PARTNER?: NO

## 2025-08-30 SDOH — SOCIAL STABILITY: SOCIAL INSECURITY: ABUSE: ADULT

## 2025-08-30 SDOH — SOCIAL STABILITY: SOCIAL INSECURITY: HAVE YOU HAD THOUGHTS OF HARMING ANYONE ELSE?: NO

## 2025-08-30 SDOH — SOCIAL STABILITY: SOCIAL INSECURITY: ARE THERE ANY APPARENT SIGNS OF INJURIES/BEHAVIORS THAT COULD BE RELATED TO ABUSE/NEGLECT?: NO

## 2025-08-30 SDOH — SOCIAL STABILITY: SOCIAL INSECURITY: WERE YOU ABLE TO COMPLETE ALL THE BEHAVIORAL HEALTH SCREENINGS?: YES

## 2025-08-30 SDOH — SOCIAL STABILITY: SOCIAL INSECURITY: DOES ANYONE TRY TO KEEP YOU FROM HAVING/CONTACTING OTHER FRIENDS OR DOING THINGS OUTSIDE YOUR HOME?: NO

## 2025-08-30 ASSESSMENT — COGNITIVE AND FUNCTIONAL STATUS - GENERAL
TOILETING: A LITTLE
DRESSING REGULAR LOWER BODY CLOTHING: A LITTLE
STANDING UP FROM CHAIR USING ARMS: A LITTLE
DRESSING REGULAR UPPER BODY CLOTHING: A LITTLE
MOVING TO AND FROM BED TO CHAIR: A LITTLE
CLIMB 3 TO 5 STEPS WITH RAILING: A LITTLE
HELP NEEDED FOR BATHING: A LITTLE
WALKING IN HOSPITAL ROOM: A LITTLE
DAILY ACTIVITIY SCORE: 20
MOBILITY SCORE: 20

## 2025-08-30 ASSESSMENT — PAIN SCALES - GENERAL
PAINLEVEL_OUTOF10: 7
PAINLEVEL_OUTOF10: 10 - WORST POSSIBLE PAIN
PAINLEVEL_OUTOF10: 7
PAINLEVEL_OUTOF10: 8

## 2025-08-30 ASSESSMENT — LIFESTYLE VARIABLES
AUDIT-C TOTAL SCORE: 0
SKIP TO QUESTIONS 9-10: 1

## 2025-08-30 ASSESSMENT — PAIN - FUNCTIONAL ASSESSMENT
PAIN_FUNCTIONAL_ASSESSMENT: 0-10

## 2025-08-30 ASSESSMENT — ACTIVITIES OF DAILY LIVING (ADL)
PATIENT'S MEMORY ADEQUATE TO SAFELY COMPLETE DAILY ACTIVITIES?: YES
BATHING: INDEPENDENT
GROOMING: INDEPENDENT
LACK_OF_TRANSPORTATION: NO
TOILETING: INDEPENDENT
JUDGMENT_ADEQUATE_SAFELY_COMPLETE_DAILY_ACTIVITIES: YES
WALKS IN HOME: INDEPENDENT
LACK_OF_TRANSPORTATION: NO
FEEDING YOURSELF: INDEPENDENT
HEARING - LEFT EAR: FUNCTIONAL
DRESSING YOURSELF: INDEPENDENT
ADEQUATE_TO_COMPLETE_ADL: YES
HEARING - RIGHT EAR: FUNCTIONAL

## 2025-08-30 ASSESSMENT — PATIENT HEALTH QUESTIONNAIRE - PHQ9
SUM OF ALL RESPONSES TO PHQ9 QUESTIONS 1 & 2: 0
2. FEELING DOWN, DEPRESSED OR HOPELESS: NOT AT ALL
1. LITTLE INTEREST OR PLEASURE IN DOING THINGS: NOT AT ALL

## 2025-08-31 ENCOUNTER — APPOINTMENT (OUTPATIENT)
Dept: RADIOLOGY | Facility: HOSPITAL | Age: 75
End: 2025-08-31
Payer: MEDICARE

## 2025-08-31 ENCOUNTER — OFFICE VISIT (OUTPATIENT)
Dept: SURGICAL ONCOLOGY | Facility: HOSPITAL | Age: 75
End: 2025-08-31
Payer: MEDICARE

## 2025-08-31 PROCEDURE — 93005 ELECTROCARDIOGRAM TRACING: CPT

## 2025-08-31 PROCEDURE — 71045 X-RAY EXAM CHEST 1 VIEW: CPT

## 2025-08-31 ASSESSMENT — PAIN SCALES - GENERAL
PAINLEVEL_OUTOF10: 8
PAINLEVEL_OUTOF10: 6
PAINLEVEL_OUTOF10: 3

## 2025-08-31 ASSESSMENT — COGNITIVE AND FUNCTIONAL STATUS - GENERAL
CLIMB 3 TO 5 STEPS WITH RAILING: A LITTLE
HELP NEEDED FOR BATHING: A LITTLE
WALKING IN HOSPITAL ROOM: A LITTLE
DRESSING REGULAR LOWER BODY CLOTHING: A LITTLE
DRESSING REGULAR UPPER BODY CLOTHING: A LITTLE
DAILY ACTIVITIY SCORE: 20
MOBILITY SCORE: 22
TOILETING: A LITTLE

## 2025-08-31 ASSESSMENT — PAIN - FUNCTIONAL ASSESSMENT
PAIN_FUNCTIONAL_ASSESSMENT: 0-10

## 2025-09-01 ASSESSMENT — COGNITIVE AND FUNCTIONAL STATUS - GENERAL
MOBILITY SCORE: 21
STANDING UP FROM CHAIR USING ARMS: A LITTLE
CLIMB 3 TO 5 STEPS WITH RAILING: A LITTLE
HELP NEEDED FOR BATHING: A LITTLE
TOILETING: A LITTLE
PERSONAL GROOMING: A LITTLE
DRESSING REGULAR LOWER BODY CLOTHING: A LITTLE
DRESSING REGULAR UPPER BODY CLOTHING: A LITTLE
DAILY ACTIVITIY SCORE: 19
WALKING IN HOSPITAL ROOM: A LITTLE

## 2025-09-01 ASSESSMENT — PAIN - FUNCTIONAL ASSESSMENT
PAIN_FUNCTIONAL_ASSESSMENT: 0-10

## 2025-09-01 ASSESSMENT — PAIN SCALES - GENERAL
PAINLEVEL_OUTOF10: 0 - NO PAIN
PAINLEVEL_OUTOF10: 0 - NO PAIN
PAINLEVEL_OUTOF10: 8
PAINLEVEL_OUTOF10: 2

## 2025-09-02 ENCOUNTER — APPOINTMENT (OUTPATIENT)
Dept: CARDIOLOGY | Facility: HOSPITAL | Age: 75
End: 2025-09-02
Payer: MEDICARE

## 2025-09-02 ENCOUNTER — OFFICE VISIT (OUTPATIENT)
Dept: SURGICAL ONCOLOGY | Facility: HOSPITAL | Age: 75
End: 2025-09-02
Payer: MEDICARE

## 2025-09-02 ENCOUNTER — APPOINTMENT (OUTPATIENT)
Dept: RADIOLOGY | Facility: HOSPITAL | Age: 75
End: 2025-09-02
Payer: MEDICARE

## 2025-09-02 PROCEDURE — 71045 X-RAY EXAM CHEST 1 VIEW: CPT

## 2025-09-02 PROCEDURE — 93306 TTE W/DOPPLER COMPLETE: CPT | Performed by: INTERNAL MEDICINE

## 2025-09-02 PROCEDURE — 93306 TTE W/DOPPLER COMPLETE: CPT

## 2025-09-02 PROCEDURE — 93005 ELECTROCARDIOGRAM TRACING: CPT

## 2025-09-02 ASSESSMENT — COGNITIVE AND FUNCTIONAL STATUS - GENERAL
DRESSING REGULAR LOWER BODY CLOTHING: A LITTLE
CLIMB 3 TO 5 STEPS WITH RAILING: TOTAL
STANDING UP FROM CHAIR USING ARMS: A LITTLE
HELP NEEDED FOR BATHING: A LITTLE
TURNING FROM BACK TO SIDE WHILE IN FLAT BAD: A LITTLE
STANDING UP FROM CHAIR USING ARMS: A LITTLE
MOVING FROM LYING ON BACK TO SITTING ON SIDE OF FLAT BED WITH BEDRAILS: A LITTLE
CLIMB 3 TO 5 STEPS WITH RAILING: A LITTLE
DAILY ACTIVITIY SCORE: 19
WALKING IN HOSPITAL ROOM: A LITTLE
WALKING IN HOSPITAL ROOM: A LITTLE
PERSONAL GROOMING: A LITTLE
DRESSING REGULAR UPPER BODY CLOTHING: A LITTLE
TOILETING: A LITTLE
MOBILITY SCORE: 21
MOBILITY SCORE: 16
MOVING TO AND FROM BED TO CHAIR: A LITTLE

## 2025-09-02 ASSESSMENT — PAIN SCALES - GENERAL
PAINLEVEL_OUTOF10: 2
PAINLEVEL_OUTOF10: 3
PAINLEVEL_OUTOF10: 7
PAINLEVEL_OUTOF10: 3

## 2025-09-02 ASSESSMENT — ENCOUNTER SYMPTOMS
FATIGUE: 1
ABDOMINAL PAIN: 1
NERVOUS/ANXIOUS: 1
MYALGIAS: 1
NAUSEA: 0
DYSPHORIC MOOD: 1

## 2025-09-02 ASSESSMENT — PAIN - FUNCTIONAL ASSESSMENT
PAIN_FUNCTIONAL_ASSESSMENT: 0-10

## 2025-09-02 ASSESSMENT — ACTIVITIES OF DAILY LIVING (ADL): ADL_ASSISTANCE: INDEPENDENT

## 2025-09-03 ASSESSMENT — COGNITIVE AND FUNCTIONAL STATUS - GENERAL
DRESSING REGULAR LOWER BODY CLOTHING: A LITTLE
WALKING IN HOSPITAL ROOM: A LITTLE
MOBILITY SCORE: 21
TOILETING: A LITTLE
HELP NEEDED FOR BATHING: A LITTLE
STANDING UP FROM CHAIR USING ARMS: A LITTLE
PERSONAL GROOMING: A LITTLE
CLIMB 3 TO 5 STEPS WITH RAILING: A LITTLE
DAILY ACTIVITIY SCORE: 19
DRESSING REGULAR UPPER BODY CLOTHING: A LITTLE

## 2025-09-03 ASSESSMENT — PAIN - FUNCTIONAL ASSESSMENT
PAIN_FUNCTIONAL_ASSESSMENT: 0-10

## 2025-09-03 ASSESSMENT — PAIN SCALES - GENERAL
PAINLEVEL_OUTOF10: 5 - MODERATE PAIN
PAINLEVEL_OUTOF10: 6
PAINLEVEL_OUTOF10: 7
PAINLEVEL_OUTOF10: 5 - MODERATE PAIN
PAINLEVEL_OUTOF10: 2
PAINLEVEL_OUTOF10: 3
PAINLEVEL_OUTOF10: 5 - MODERATE PAIN
PAINLEVEL_OUTOF10: 7

## 2025-09-04 PROCEDURE — RXMED WILLOW AMBULATORY MEDICATION CHARGE

## 2025-09-04 ASSESSMENT — PAIN - FUNCTIONAL ASSESSMENT
PAIN_FUNCTIONAL_ASSESSMENT: 0-10

## 2025-09-04 ASSESSMENT — COGNITIVE AND FUNCTIONAL STATUS - GENERAL
CLIMB 3 TO 5 STEPS WITH RAILING: A LITTLE
DAILY ACTIVITIY SCORE: 23
MOBILITY SCORE: 23
HELP NEEDED FOR BATHING: A LITTLE

## 2025-09-04 ASSESSMENT — PAIN SCALES - GENERAL
PAINLEVEL_OUTOF10: 5 - MODERATE PAIN
PAINLEVEL_OUTOF10: 2
PAINLEVEL_OUTOF10: 6
PAINLEVEL_OUTOF10: 8
PAINLEVEL_OUTOF10: 7
PAINLEVEL_OUTOF10: 5 - MODERATE PAIN
PAINLEVEL_OUTOF10: 7
PAINLEVEL_OUTOF10: 7

## 2025-09-04 ASSESSMENT — PAIN DESCRIPTION - LOCATION
LOCATION: ABDOMEN
LOCATION: ABDOMEN

## 2025-09-04 ASSESSMENT — PAIN DESCRIPTION - DESCRIPTORS: DESCRIPTORS: STABBING

## 2025-09-04 ASSESSMENT — PAIN DESCRIPTION - ORIENTATION: ORIENTATION: MID

## 2025-09-05 ENCOUNTER — DOCUMENTATION (OUTPATIENT)
Dept: HOME HEALTH SERVICES | Facility: HOME HEALTH | Age: 75
End: 2025-09-05
Payer: MEDICARE

## 2025-09-05 ENCOUNTER — APPOINTMENT (OUTPATIENT)
Dept: CARDIOLOGY | Facility: HOSPITAL | Age: 75
End: 2025-09-05
Payer: MEDICARE

## 2025-09-05 ENCOUNTER — PHARMACY VISIT (OUTPATIENT)
Dept: PHARMACY | Facility: CLINIC | Age: 75
End: 2025-09-05
Payer: COMMERCIAL

## 2025-09-05 PROCEDURE — RXMED WILLOW AMBULATORY MEDICATION CHARGE

## 2025-09-05 PROCEDURE — 93246 EXT ECG>7D<15D RECORDING: CPT

## 2025-09-05 ASSESSMENT — COGNITIVE AND FUNCTIONAL STATUS - GENERAL
CLIMB 3 TO 5 STEPS WITH RAILING: A LITTLE
DAILY ACTIVITIY SCORE: 23
HELP NEEDED FOR BATHING: A LITTLE
DAILY ACTIVITIY SCORE: 24
CLIMB 3 TO 5 STEPS WITH RAILING: A LITTLE
MOBILITY SCORE: 23
MOBILITY SCORE: 23

## 2025-09-05 ASSESSMENT — PAIN SCALES - GENERAL
PAINLEVEL_OUTOF10: 0 - NO PAIN
PAINLEVEL_OUTOF10: 8

## 2025-09-05 ASSESSMENT — PAIN - FUNCTIONAL ASSESSMENT
PAIN_FUNCTIONAL_ASSESSMENT: 0-10
PAIN_FUNCTIONAL_ASSESSMENT: 0-10

## 2025-09-05 ASSESSMENT — PAIN DESCRIPTION - LOCATION: LOCATION: ABDOMEN

## 2026-05-06 ENCOUNTER — APPOINTMENT (OUTPATIENT)
Dept: PRIMARY CARE | Facility: CLINIC | Age: 76
End: 2026-05-06
Payer: MEDICARE

## (undated) DEVICE — IRRIGATOR, WOUND, HYDRO SURG PLUS, W/O TIP, DISP

## (undated) DEVICE — HANDPIECE, POOLE SUCTION, W/O TUBING

## (undated) DEVICE — TROCAR, OPTICAL BLADELESS 5MM X 100 W/ADVANCED FIXATION

## (undated) DEVICE — GLOVE, SURGICAL, PROTEXIS PI BLUE W/NEUTHERA, 8.0, PF, LF

## (undated) DEVICE — SHEARS, CURVED 5MM, ENDOPATH

## (undated) DEVICE — Device

## (undated) DEVICE — SOLUTION, IRRIGATION, X RX SODIUM CHL 0.9%, 1000ML BTL

## (undated) DEVICE — SONOPLEX STIM, FACET TIP, 21GA X 4IN

## (undated) DEVICE — DRAPE, SHEET, UTILITY, NON ABSORBENT, 18 X 26 IN, LF

## (undated) DEVICE — DISSECTOR, KITTNER, PEANUT, 5MM

## (undated) DEVICE — SUTURE, MONOCRYL, 4-0, 18 IN, PS2, UNDYED

## (undated) DEVICE — TROCAR SYSTEM, BALLOON, KII GELPORT, 12 X 100MM

## (undated) DEVICE — SLEEVE, KII, Z-THREAD, 5X100CM

## (undated) DEVICE — EVACUATOR, WOUND, SUCTION, CLOSED, JACKSON-PRATT, 100 CC, SILICONE

## (undated) DEVICE — SUTURE, VICRYL, 0, 27 IN, UR-6, VIOLET

## (undated) DEVICE — STAPLER,  ECHELON CIRCULAR POWERED, 29MM, DISP

## (undated) DEVICE — GOWN, SMARTY, XXL, X-LONG

## (undated) DEVICE — BLADE, SURGICAL, POLYMER COATED P10, STERILE, DISP

## (undated) DEVICE — NEEDLE, INSUFFLATION, 13GAX120MM, DISP

## (undated) DEVICE — SUTURE, CHROMIC GUT, 3-0, SH 27"

## (undated) DEVICE — NERVE BLOCK, STIMUQUIK, SINGLE-SHOT, 21GA X 3-1/2

## (undated) DEVICE — POSITIONING SYSTEM, PAGAZZI, PATIENT

## (undated) DEVICE — SALINE NORMAL (100/PK)

## (undated) DEVICE — STAPLER, ECHELON FLEX GST, POWERED PLUS, 60MM X 34CM, STANDARD

## (undated) DEVICE — SUTURE, PDS II, 0 36 IN, CT-1, VIOLET

## (undated) DEVICE — CATHETER TRAY, FOLEY, ALOETOUCH, 16FR, 10ML, W/ DRAINBAG

## (undated) DEVICE — STAPLER,  LINEAR ENDO 60MM RELOAD, GREEN, DISP

## (undated) DEVICE — DISSECTOR, LAPAROSCOPIC, 5MM BLUNT TIP

## (undated) DEVICE — CORD, MONOPOLARD, 10FT, DISP

## (undated) DEVICE — SUTURE, PDS II, 1, 36 IN, CT, VIOLET

## (undated) DEVICE — SUTURE, SILK, 2-0, TIES, 12-30 IN, BLACK

## (undated) DEVICE — ENDOPATH, XCEL, 5MM X 100MM, FOR BLADELESS TROCAR

## (undated) DEVICE — DRAPE, SHEET, ENDOSCOPY, GENERAL, FENESTRATED, ARMBOARD COVER, 98 X 123.5 IN, DISPOSABLE, LF, STERILE

## (undated) DEVICE — LIGASURE, V SEALER/DIVIDER  5MM BLUNT TIP

## (undated) DEVICE — SUTURE, SILK, 2-0, 30 IN, SH, BLACK

## (undated) DEVICE — DRAPE, UNDERBUTTOCKS, W/ SUCTION PORT